# Patient Record
Sex: FEMALE | Employment: UNEMPLOYED | URBAN - NONMETROPOLITAN AREA
[De-identification: names, ages, dates, MRNs, and addresses within clinical notes are randomized per-mention and may not be internally consistent; named-entity substitution may affect disease eponyms.]

---

## 2018-09-17 ENCOUNTER — OFFICE VISIT (OUTPATIENT)
Dept: FAMILY MEDICINE CLINIC | Age: 28
End: 2018-09-17
Payer: COMMERCIAL

## 2018-09-17 VITALS
WEIGHT: 195 LBS | HEART RATE: 87 BPM | OXYGEN SATURATION: 98 % | SYSTOLIC BLOOD PRESSURE: 120 MMHG | HEIGHT: 65 IN | RESPIRATION RATE: 16 BRPM | TEMPERATURE: 97.2 F | DIASTOLIC BLOOD PRESSURE: 80 MMHG | BODY MASS INDEX: 32.49 KG/M2

## 2018-09-17 DIAGNOSIS — F31.9 BIPOLAR 1 DISORDER (HCC): Primary | ICD-10-CM

## 2018-09-17 DIAGNOSIS — Z76.89 ENCOUNTER TO ESTABLISH CARE WITH NEW DOCTOR: ICD-10-CM

## 2018-09-17 DIAGNOSIS — F20.89 OTHER SCHIZOPHRENIA (HCC): ICD-10-CM

## 2018-09-17 DIAGNOSIS — L20.82 FLEXURAL ECZEMA: ICD-10-CM

## 2018-09-17 PROCEDURE — 99204 OFFICE O/P NEW MOD 45 MIN: CPT | Performed by: NURSE PRACTITIONER

## 2018-09-17 PROCEDURE — G8417 CALC BMI ABV UP PARAM F/U: HCPCS | Performed by: NURSE PRACTITIONER

## 2018-09-17 PROCEDURE — 4004F PT TOBACCO SCREEN RCVD TLK: CPT | Performed by: NURSE PRACTITIONER

## 2018-09-17 PROCEDURE — G8427 DOCREV CUR MEDS BY ELIG CLIN: HCPCS | Performed by: NURSE PRACTITIONER

## 2018-09-17 RX ORDER — DIVALPROEX SODIUM 500 MG/1
500 TABLET, EXTENDED RELEASE ORAL DAILY
Qty: 30 TABLET | Refills: 0 | Status: SHIPPED | OUTPATIENT
Start: 2018-09-17 | End: 2019-05-03 | Stop reason: ALTCHOICE

## 2018-09-17 RX ORDER — DIVALPROEX SODIUM 500 MG/1
500 TABLET, EXTENDED RELEASE ORAL DAILY
COMMUNITY
End: 2018-09-17 | Stop reason: SDUPTHER

## 2018-09-17 RX ORDER — RISPERIDONE 2 MG/1
2 TABLET, FILM COATED ORAL 2 TIMES DAILY
Qty: 60 TABLET | Refills: 0 | Status: SHIPPED | OUTPATIENT
Start: 2018-09-17 | End: 2019-05-03 | Stop reason: CLARIF

## 2018-09-17 RX ORDER — DIAPER,BRIEF,INFANT-TODD,DISP
EACH MISCELLANEOUS
Qty: 56 G | Refills: 1 | Status: SHIPPED | OUTPATIENT
Start: 2018-09-17 | End: 2019-06-24

## 2018-09-17 RX ORDER — ZIPRASIDONE HYDROCHLORIDE 80 MG/1
80 CAPSULE ORAL 2 TIMES DAILY
Qty: 60 CAPSULE | Refills: 0 | Status: SHIPPED | OUTPATIENT
Start: 2018-09-17 | End: 2020-06-16

## 2018-09-17 ASSESSMENT — PATIENT HEALTH QUESTIONNAIRE - PHQ9
SUM OF ALL RESPONSES TO PHQ QUESTIONS 1-9: 2
SUM OF ALL RESPONSES TO PHQ9 QUESTIONS 1 & 2: 2
SUM OF ALL RESPONSES TO PHQ QUESTIONS 1-9: 2
1. LITTLE INTEREST OR PLEASURE IN DOING THINGS: 1
2. FEELING DOWN, DEPRESSED OR HOPELESS: 1

## 2018-09-17 NOTE — PATIENT INSTRUCTIONS
to lower your stress. Manage your time, build a strong support system, and lead a healthy lifestyle. To lower your stress, try physical activity, slow deep breathing, or getting a massage. · Do not use alcohol or illegal drugs. · Learn the early signs of your mood changes. You can then take steps to help yourself feel better. · Ask for help from friends and family when you need it. You may need help with daily chores when you are depressed. When you are manic, you may need support to control your high energy levels. What should you do if someone in your family has bipolar disorder? · Learn about the disease and the signs that it is getting worse. · Remind your family member that you love him or her. · Make a plan with all family members about how to take care of your loved one when his or her symptoms are bad. · Talk about your fears and concerns and those of other family members. Seek counseling if needed. · Do not focus attention only on the person who is in treatment. · Remind yourself that it will take time for changes to occur. · Do not blame yourself for the disease. · Know your legal rights and the legal rights of your family member. Support groups or counselors can help you with this information. · Take care of yourself. Keep up with your own interests, such as your career, hobbies, and friends. Use exercise, positive self-talk, deep breathing, and other relaxing exercises to help lower your stress. · Give yourself time to grieve. You may need to deal with emotions such as anger, fear, and frustration. After you work through your feelings, you will be better able to care for yourself and your family. · If you are having a hard time with your feelings or with your relationship with your family member, talk with a counselor. When should you call for help? Call 911 anytime you think you may need emergency care.  For example, call if:    · You feel like hurting yourself or someone else.     · Someone who has bipolar disorder displays dangerous behavior, and you think the person might hurt himself or herself or someone else.   [facility] your doctor now or seek immediate medical care if:    · You hear voices.     · Someone you know has bipolar disorder and talks about suicide. Keep the numbers for these national suicide hotlines: 8-255-218-TALK (5-962.604.7529) and 1-224-MXDUDLV (1-984.943.9566). If a suicide threat seems real, with a specific plan and a way to carry it out, stay with the person, or ask someone you trust to stay with the person, until you can get help.     · Someone you know has bipolar disorder and:  ¨ Starts to give away possessions. ¨ Is using illegal drugs or drinking alcohol heavily. ¨ Talks or writes about death, including writing suicide notes or talking about guns, knives, or pills. ¨ Talks or writes about hurting someone else. ¨ Starts to spend a lot of time alone. ¨ Acts very aggressively or suddenly appears calm. ¨ Talks about beliefs that are not based in reality (delusions).    Watch closely for changes in your health, and be sure to contact your doctor if:    · You cannot go to your counseling sessions. Where can you learn more? Go to https://ANDalyze.Utrip. org and sign in to your Movolo.com account. Enter K052 in the [facility] box to learn more about \"Bipolar Disorder: Care Instructions. \"     If you do not have an account, please click on the \"Sign Up Now\" link. Current as of: December 7, 2017  Content Version: 11.7  © 3399-7211 [employer]og[employer], Incorporated. Care instructions adapted under license by [facility] ([facility]). If you have questions about a medical condition or this instruction, always ask your healthcare professional. [address] any warranty or liability for your use of this information.

## 2018-09-17 NOTE — PROGRESS NOTES
2018    Caio Storm (:  1990) is a 29 y.o. female, here for evaluation of the following medical concerns:    Radha Rey is here with her step-mother to become an established patient today. She has a history of bi-polar, schizophrenia, psycho affective disorder. She moved to the area in the past few months. She was seeing a psychiatrist in [facility], [address]; Dr. Meza November.  Her step-mother states that she seems to have low motivation and low energy. She has been taking her medications as prescribed by the previous pcp. According to her step-mother, she has the mentality of a 15year old. At this time, Radha Rey has no complaints or concerns. Review of Systems   Constitutional: Positive for fatigue. Negative for chills and fever. HENT: Negative for sinus pain and sinus pressure. Eyes: Negative. Respiratory: Negative for cough and shortness of breath. Cardiovascular: Negative for chest pain and leg swelling. Gastrointestinal: Negative for abdominal pain, constipation, diarrhea, nausea and vomiting. Endocrine: Negative. Genitourinary: Negative for difficulty urinating and dysuria. Musculoskeletal: Negative for back pain, myalgias and neck pain. Skin: Positive for rash. Pallor: bilateral insides of her elbows. Neurological: Negative. Psychiatric/Behavioral: Positive for decreased concentration and dysphoric mood. Negative for agitation, behavioral problems, confusion, hallucinations, self-injury, sleep disturbance and suicidal ideas. The patient is not nervous/anxious and is not hyperactive. Prior to Visit Medications    Medication Sig Taking?  Authorizing Provider   ziprasidone (GEODON) 80 MG capsule Take 1 capsule by mouth 2 times daily Yes Galindo November, APRN - CNP   risperiDONE (RISPERDAL) 2 MG tablet Take 1 tablet by mouth 2 times daily 1/2 tab AM and 1 tab at bedtime Yes Galindo November, APRN - CNP   divalproex (DEPAKOTE ER) 500 MG extended release

## 2018-09-18 ASSESSMENT — ENCOUNTER SYMPTOMS
BACK PAIN: 0
VOMITING: 0
SINUS PAIN: 0
SHORTNESS OF BREATH: 0
COUGH: 0
ABDOMINAL PAIN: 0
NAUSEA: 0
CONSTIPATION: 0
EYES NEGATIVE: 1
DIARRHEA: 0
SINUS PRESSURE: 0

## 2018-09-19 ENCOUNTER — TELEPHONE (OUTPATIENT)
Dept: FAMILY MEDICINE CLINIC | Age: 28
End: 2018-09-19

## 2018-09-19 NOTE — TELEPHONE ENCOUNTER
eMra from [facility] called to let us know that Iva Abebe reviewed the patient chart and has determined Alejandro Vides is a better fit for the patients needs.

## 2018-09-20 NOTE — TELEPHONE ENCOUNTER
Called [facility] and left a message. Needing to know requirements for referring patient to this office.

## 2018-09-21 NOTE — TELEPHONE ENCOUNTER
Patient step mom returned my phone call. Notified her that the patient was being referred to [facility] and explained to her Bills process. That it is based on walk-in from 8am-8pm. Patient would need to bring photo id, insurance card, and proof of income if she has it. Then they would do a screening and if they arrived early enough between 8am-2pm they would be able to complete a diagnostic screening which would help get the process going sooner. Gave her the address and phone number. She did ask why [facility] office had called and scheduled an appointment with the patient and then called and canceled it. Notified her that the office had reviewed the patient file and diagnosis and decided [employer] [facility] had more available to provide the best care for the patient. She voiced understanding and denied any other questions or concerns at this time.

## 2019-02-01 LAB
ALBUMIN SERPL-MCNC: 3.7 G/DL
ALP BLD-CCNC: 87 U/L
ALT SERPL-CCNC: 17 U/L
ANION GAP SERPL CALCULATED.3IONS-SCNC: 14 MMOL/L
AST SERPL-CCNC: 20 U/L
BASOPHILS ABSOLUTE: ABNORMAL /ΜL
BASOPHILS RELATIVE PERCENT: ABNORMAL %
BILIRUB SERPL-MCNC: 0.2 MG/DL (ref 0.1–1.4)
BUN BLDV-MCNC: 10 MG/DL
CALCIUM SERPL-MCNC: 8.9 MG/DL
CHLORIDE BLD-SCNC: 103 MMOL/L
CO2: 20 MMOL/L
CREAT SERPL-MCNC: 0.56 MG/DL
EOSINOPHILS ABSOLUTE: ABNORMAL /ΜL
EOSINOPHILS RELATIVE PERCENT: ABNORMAL %
GFR CALCULATED: >60
GLUCOSE BLD-MCNC: 149 MG/DL
HCT VFR BLD CALC: 35.3 % (ref 36–46)
HEMOGLOBIN: 11.7 G/DL (ref 12–16)
LYMPHOCYTES ABSOLUTE: ABNORMAL /ΜL
LYMPHOCYTES RELATIVE PERCENT: ABNORMAL %
MCH RBC QN AUTO: 28.7 PG
MCHC RBC AUTO-ENTMCNC: 33.2 G/DL
MCV RBC AUTO: 86.4 FL
MONOCYTES ABSOLUTE: ABNORMAL /ΜL
MONOCYTES RELATIVE PERCENT: ABNORMAL %
NEUTROPHILS ABSOLUTE: ABNORMAL /ΜL
NEUTROPHILS RELATIVE PERCENT: ABNORMAL %
PDW BLD-RTO: 13.8 %
PLATELET # BLD: 311 K/ΜL
PMV BLD AUTO: 6.7 FL
POTASSIUM SERPL-SCNC: 3.7 MMOL/L
RBC # BLD: 4.09 10^6/ΜL
SODIUM BLD-SCNC: 133 MMOL/L
TOTAL PROTEIN: 7.5
WBC # BLD: 6.8 10^3/ML

## 2019-05-03 ENCOUNTER — OFFICE VISIT (OUTPATIENT)
Dept: FAMILY MEDICINE CLINIC | Age: 29
End: 2019-05-03
Payer: COMMERCIAL

## 2019-05-03 VITALS
HEIGHT: 66 IN | BODY MASS INDEX: 31.82 KG/M2 | HEART RATE: 63 BPM | DIASTOLIC BLOOD PRESSURE: 70 MMHG | WEIGHT: 198 LBS | SYSTOLIC BLOOD PRESSURE: 108 MMHG | OXYGEN SATURATION: 98 % | TEMPERATURE: 97.7 F | RESPIRATION RATE: 16 BRPM

## 2019-05-03 DIAGNOSIS — B35.1 TOENAIL FUNGUS: ICD-10-CM

## 2019-05-03 DIAGNOSIS — B35.3 TINEA PEDIS OF BOTH FEET: Primary | ICD-10-CM

## 2019-05-03 PROCEDURE — 99213 OFFICE O/P EST LOW 20 MIN: CPT | Performed by: NURSE PRACTITIONER

## 2019-05-03 PROCEDURE — 4004F PT TOBACCO SCREEN RCVD TLK: CPT | Performed by: NURSE PRACTITIONER

## 2019-05-03 PROCEDURE — G8427 DOCREV CUR MEDS BY ELIG CLIN: HCPCS | Performed by: NURSE PRACTITIONER

## 2019-05-03 PROCEDURE — G8417 CALC BMI ABV UP PARAM F/U: HCPCS | Performed by: NURSE PRACTITIONER

## 2019-05-03 RX ORDER — TERBINAFINE HYDROCHLORIDE 250 MG/1
250 TABLET ORAL DAILY
Qty: 42 TABLET | Refills: 0 | Status: SHIPPED | OUTPATIENT
Start: 2019-05-03 | End: 2019-06-24 | Stop reason: SDUPTHER

## 2019-05-03 RX ORDER — RISPERIDONE 0.5 MG/1
TABLET, FILM COATED ORAL
Refills: 2 | COMMUNITY
Start: 2019-04-17 | End: 2020-06-16

## 2019-05-03 NOTE — PATIENT INSTRUCTIONS
if:    · You have signs of infection, such as:  ? Increased pain, swelling, warmth, or redness. ? Red streaks leading from the site. ? Pus draining from the site. ? A fever.     · You have new or increased toe pain.    Watch closely for changes in your health, and be sure to contact your doctor if:    · You do not get better as expected. Where can you learn more? Go to https://3Play Mediapepiceweb.Ultragenyx Pharmaceutical. org and sign in to your Webdyn account. Enter D202 in the [employer] box to learn more about \"Toenail Fungus: Care Instructions. \"     If you do not have an account, please click on the \"Sign Up Now\" link. Current as of: April 17, 2018  Content Version: 11.9  © 0500-2318 Credivalores-Crediservicios. Care instructions adapted under license by [facility] ([facility]). If you have questions about a medical condition or this instruction, always ask your healthcare professional. [place] [address] any warranty or liability for your use of this information. Patient Education        Toenail Fungus: Care Instructions  Your Care Instructions  A toenail that is infected by a fungus usually turns white or yellow. As the fungus spreads, the nail turns a darker color and gets thicker, and its edges start to turn ragged and crumble. A bad infection can cause toe pain, and the nail may pull away from the toe. Toenails that are exposed to moisture and warmth a lot are more likely to get infected by a fungus. This can happen from wearing sweaty shoes often and from walking barefoot on shower floors. It is hard to treat toenail fungus, and the infection can return after it has cleared up. But medicines can sometimes get rid of toenail fungus for good. If the infection is very bad, or if it causes a lot of pain, you may need to have the nail removed. Follow-up care is a key part of your treatment and safety. Be sure to make and go to all appointments, and call your doctor if you are having problems. It's also a good idea to know your test results and keep a list of the medicines you take. How can you care for yourself at home? · Take your medicines exactly as prescribed. Call your doctor if you have any problems with your medicine. You will get more details on the specific medicines your doctor prescribes. · If your doctor gave you a cream or liquid to put on your toenail, use it exactly as directed. · Wash your feet often, and wash your hands after touching your feet. · Keep your toenails clean and dry. Dry your feet completely after you bathe and before you put on shoes and socks. · Keep your toenails trimmed. · Change socks often. Wear dry socks that absorb moisture. · Do not go barefoot in public places. · Use a spray or powder that fights fungus on your feet and in your shoes. · Do not pick at the skin around your nails. · Do not use nail polish or fake nails on your toenails. When should you call for help? Call your doctor now or seek immediate medical care if:    · You have signs of infection, such as:  ? Increased pain, swelling, warmth, or redness. ? Red streaks leading from the site. ? Pus draining from the site. ? A fever.     · You have new or increased toe pain.    Watch closely for changes in your health, and be sure to contact your doctor if:    · You do not get better as expected. Where can you learn more? Go to https://TelirispeMeridian Energy USA.Microbridge Technologies [place]. org and sign in to your [employer]anta Pharmaceuticals account. Enter D202 in the [facility] box to learn more about \"Toenail Fungus: Care Instructions. \"     If you do not have an account, please click on the \"Sign Up Now\" link. Current as of: April 17, 2018  Content Version: 11.9  © 5982-2288 MJH. Care instructions adapted under license by [employer].  If you have questions about a medical condition or this instruction, always ask your healthcare professional. Frida Shields disclaims any warranty or liability for your use of this information.

## 2019-05-08 ASSESSMENT — ENCOUNTER SYMPTOMS
SHORTNESS OF BREATH: 0
COLOR CHANGE: 1
DIARRHEA: 0
COUGH: 0
NAUSEA: 0
VOMITING: 0

## 2019-05-08 NOTE — PROGRESS NOTES
[address] [address] [place] [address]  Dept: [phone]  Dept Fax: [phone]  Loc: [phone]      Natalie Mijares is a 29 y.o. female who presents todayfor Foot Pain (left foot- noticied 2 \"spots\" on bottom on foot wed. - painful )      HPI:      Annie Morales is a 31-year-old female she is here with complaints of spots on her left foot that she noticed Wednesday, she states they're painful at times. She also complains of an odor to both of her feet. The patient has No Known Allergies. Past MedicalHistory  Annie Morales  has a past medical history of Bipolar affective ([facility] [address] Medications    Current Outpatient Medications:     risperiDONE (RISPERDAL) 0.5 MG tablet, TAKE 1 TAB EVERY MORNING AND 2 TABLETS EVERY EVENING, Disp: , Rfl: 2    terbinafine (LAMISIL) 250 MG tablet, Take 1 tablet by mouth daily, Disp: 42 tablet, Rfl: 0    ziprasidone (GEODON) 80 MG capsule, Take 1 capsule by mouth 2 times daily, Disp: 60 capsule, Rfl: 0    hydrocortisone 0.5 % ointment, Apply topically 2 times daily. , Disp: 56 g, Rfl: 1    Subjective:      Review of Systems   Constitutional: Negative for chills, fatigue and fever. Respiratory: Negative for cough and shortness of breath. Gastrointestinal: Negative for diarrhea, nausea and vomiting. Genitourinary: Negative for difficulty urinating. Skin: Positive for color change. Odor, itching to bilateral feet. Neurological: Negative for dizziness, weakness and headaches. Objective:        Vitals:    05/03/19 1130   BP: 108/70   Site: Left Upper Arm   Position: Sitting   Pulse: 63   Resp: 16   Temp: 97.7 °F (36.5 °C)   TempSrc: Oral   SpO2: 98%   Weight: 198 lb (89.8 kg)   Height: 5' 6\" (1.676 m)      Physical Exam   Constitutional: She is oriented to person, place, and time. She appears well-developed and well-nourished. No distress.    HENT:   Head: Normocephalic and atraumatic. Eyes: Pupils are equal, round, and reactive to light. Conjunctivae and EOM are normal.   Neck: Normal range of motion. Neck supple. Cardiovascular: Normal rate and regular rhythm. Pulmonary/Chest: Effort normal and breath sounds normal.   Musculoskeletal: Normal range of motion. Neurological: She is alert and oriented to person, place, and time. Skin: Skin is warm. Capillary refill takes less than 2 seconds. She is not diaphoretic. Bilateral anterior portion of feet with scaling. Bilateral feet both moist with sweat and very malodorous. No open sores noted to feet. Several toes on bilateral feet with thick toenails yellow in color. Vitals reviewed. Assessment/Plan:       Toro Monroe was seen today for foot pain. Diagnoses and all orders for this visit:    Tinea pedis of both feet  -     terbinafine (LAMISIL) 250 MG tablet; Take 1 tablet by mouth daily    Toenail fungus  -     terbinafine (LAMISIL) 250 MG tablet; Take 1 tablet by mouth daily    Due to Toro Monroe is extensive bilateral tinea pedis the decision was made to start her on Lamisil likely will be for 12 weeks. Recent AST 20, ALT 17.    Educational material provided and reviewed, patient is to follow-up in 4-6 weeks or sooner if needed. Return in about 6 weeks (around 6/14/2019). Patient instructions given and reviewed.     Electronicallysigned by CAMMIE Felix CNP on 5/8/2019 at 5:28 PM

## 2019-06-24 ENCOUNTER — OFFICE VISIT (OUTPATIENT)
Dept: FAMILY MEDICINE CLINIC | Age: 29
End: 2019-06-24
Payer: COMMERCIAL

## 2019-06-24 VITALS
RESPIRATION RATE: 16 BRPM | OXYGEN SATURATION: 96 % | SYSTOLIC BLOOD PRESSURE: 104 MMHG | HEART RATE: 74 BPM | BODY MASS INDEX: 31.72 KG/M2 | HEIGHT: 66 IN | WEIGHT: 197.4 LBS | DIASTOLIC BLOOD PRESSURE: 72 MMHG

## 2019-06-24 DIAGNOSIS — B35.3 TINEA PEDIS OF BOTH FEET: ICD-10-CM

## 2019-06-24 DIAGNOSIS — B35.1 TOENAIL FUNGUS: ICD-10-CM

## 2019-06-24 PROCEDURE — 99214 OFFICE O/P EST MOD 30 MIN: CPT | Performed by: NURSE PRACTITIONER

## 2019-06-24 PROCEDURE — 1036F TOBACCO NON-USER: CPT | Performed by: NURSE PRACTITIONER

## 2019-06-24 PROCEDURE — G8417 CALC BMI ABV UP PARAM F/U: HCPCS | Performed by: NURSE PRACTITIONER

## 2019-06-24 PROCEDURE — G8427 DOCREV CUR MEDS BY ELIG CLIN: HCPCS | Performed by: NURSE PRACTITIONER

## 2019-06-24 RX ORDER — TERBINAFINE HYDROCHLORIDE 250 MG/1
250 TABLET ORAL DAILY
Qty: 42 TABLET | Refills: 0 | Status: SHIPPED | OUTPATIENT
Start: 2019-06-24 | End: 2019-08-05

## 2019-06-24 NOTE — PROGRESS NOTES
2019     Pedro Sierra (:  1990) is a 29 y.o. female, here for evaluation of the following medical concerns:    Adrian is a 59-year-old female known to myself she is here to follow-up for bilateral toe fungus. She states that it has improved about 50%, she has been on it for 6 weeks      Review of Systems   Constitutional: Negative for appetite change, chills, diaphoresis, fatigue and fever. Respiratory: Negative for cough, choking and shortness of breath. Cardiovascular: Negative for chest pain and leg swelling. Gastrointestinal: Negative for constipation, nausea and vomiting. Genitourinary: Negative for difficulty urinating. Musculoskeletal: Negative for back pain, gait problem, joint swelling, myalgias and neck pain. Skin: Negative for color change, pallor, rash and wound. Bilateral great toenails still with yellowing, smaller toes with improvement. Prior to Visit Medications    Medication Sig Taking? Authorizing Provider   terbinafine (LAMISIL) 250 MG tablet Take 1 tablet by mouth daily Yes CAMMIE Hooper CNP   risperiDONE (RISPERDAL) 0.5 MG tablet TAKE 1 TAB EVERY MORNING AND 2 TABLETS EVERY EVENING Yes Historical Provider, MD   ziprasidone (GEODON) 80 MG capsule Take 1 capsule by mouth 2 times daily Yes CAMMIE Hooper CNP        Social History     Tobacco Use    Smoking status: Former Smoker     Types: Cigarettes     Last attempt to quit: 2019     Years since quittin.1    Smokeless tobacco: Never Used   Substance Use Topics    Alcohol use: No        Vitals:    19 1102   BP: 104/72   Site: Right Upper Arm   Pulse: 74   Resp: 16   SpO2: 96%   Weight: 197 lb 6.4 oz (89.5 kg)   Height: 5' 6\" (1.676 m)     Estimated body mass index is 31.86 kg/m² as calculated from the following:    Height as of this encounter: 5' 6\" (1.676 m). Weight as of this encounter: 197 lb 6.4 oz (89.5 kg).     Physical Exam   Constitutional: She is oriented to person, place, and time. She appears well-developed and well-nourished. No distress. HENT:   Head: Normocephalic and atraumatic. Right Ear: External ear normal.   Left Ear: External ear normal.   Nose: Nose normal.   Mouth/Throat: Oropharynx is clear and moist. No oropharyngeal exudate. Eyes: Pupils are equal, round, and reactive to light. Conjunctivae and EOM are normal.   Neck: Normal range of motion. Neck supple. Cardiovascular: Normal rate and regular rhythm. Pulmonary/Chest: Effort normal and breath sounds normal.   Abdominal: Soft. Musculoskeletal: Normal range of motion. Neurological: She is alert and oriented to person, place, and time. Skin: Skin is warm. Capillary refill takes less than 2 seconds. She is not diaphoretic. Bilateral great toenails, thick and yellow in color. All other toe nails, appear to be growing out healthy from the nail fold. Psychiatric: She has a normal mood and affect. Vitals reviewed. ASSESSMENT/PLAN:  1. Tinea pedis of both feet  resolved    2. Toenail fungus  Due to the toenail fungus that still remains an additional 6 weeks or can to be completed of the Lamisil. - terbinafine (LAMISIL) 250 MG tablet; Take 1 tablet by mouth daily  Dispense: 42 tablet; Refill: 0      Return in about 3 months (around 9/24/2019). An electronic signature was used to authenticate this note.     --Joaquin Celis, CAMMIE - CNP on 6/25/2019 at 1:49 PM

## 2019-06-24 NOTE — PATIENT INSTRUCTIONS
Patient Education        Athlete's Foot: Care Instructions  Your Care Instructions    Athlete's foot is an itchy rash on the foot caused by an infection with a fungus. You can get it by going barefoot in wet public areas, such as swimming pools or locker rooms. Many times there is no clear reason why you get athlete's foot. You can easily treat athlete's foot by putting medicine on your feet for 1 to 6 weeks. In some cases, a doctor may prescribe pills to kill the fungus. Follow-up care is a key part of your treatment and safety. Be sure to make and go to all appointments, and call your doctor if you are having problems. It's also a good idea to know your test results and keep a list of the medicines you take. How can you care for yourself at home? · Your doctor may suggest an over-the counter lotion or spray or may prescribe a medicine. Take your medicines exactly as prescribed. Call your doctor if you think you are having a problem with your medicine. · Keep your feet clean and dry. · When you get dressed, put your socks on before your underwear. This can prevent the fungus from spreading from your feet to your groin. To prevent athlete's foot  · Wear flip-flops or other shower sandals in public locker rooms and showers and by the pool. · Dry between your toes after swimming or bathing. · Wear leather shoes or sandals, which let air get to your feet. · Change your socks as needed so your feet stay as dry as possible. · Use antifungal powder on your feet. When should you call for help? Watch closely for changes in your health, and be sure to contact your doctor if:    · You do not get better as expected. Where can you learn more? Go to https://chpatience.excentos. org and sign in to your YouTab account. Enter M498 in the [employer] box to learn more about \"Athlete's Foot: Care Instructions. \"     If you do not have an account, please click on the \"Sign Up Now\" link.   Current as of: April 17, 2018  Content Version: 12.0  © 3769-6312 Healthwise, [employer]. Care instructions adapted under license by [facility] ([facility]). If you have questions about a medical condition or this instruction, always ask your healthcare professional. [address] any warranty or liability for your use of this information. Patient Education        Toenail Fungus: Care Instructions  Your Care Instructions  A toenail that is infected by a fungus usually turns white or yellow. As the fungus spreads, the nail turns a darker color and gets thicker, and its edges start to turn ragged and crumble. A bad infection can cause toe pain, and the nail may pull away from the toe. Toenails that are exposed to moisture and warmth a lot are more likely to get infected by a fungus. This can happen from wearing sweaty shoes often and from walking barefoot on shower floors. It is hard to treat toenail fungus, and the infection can return after it has cleared up. But medicines can sometimes get rid of toenail fungus for good. If the infection is very bad, or if it causes a lot of pain, you may need to have the nail removed. Follow-up care is a key part of your treatment and safety. Be sure to make and go to all appointments, and call your doctor if you are having problems. It's also a good idea to know your test results and keep a list of the medicines you take. How can you care for yourself at home? · Take your medicines exactly as prescribed. Call your doctor if you have any problems with your medicine. You will get more details on the specific medicines your doctor prescribes. · If your doctor gave you a cream or liquid to put on your toenail, use it exactly as directed. · Wash your feet often, and wash your hands after touching your feet. · Keep your toenails clean and dry. Dry your feet completely after you bathe and before you put on shoes and socks. · Keep your toenails trimmed.   · Change socks often. Wear dry socks that absorb moisture. · Do not go barefoot in public places. · Use a spray or powder that fights fungus on your feet and in your shoes. · Do not pick at the skin around your nails. · Do not use nail polish or fake nails on your toenails. When should you call for help? Call your doctor now or seek immediate medical care if:    · You have signs of infection, such as:  ? Increased pain, swelling, warmth, or redness. ? Red streaks leading from the site. ? Pus draining from the site. ? A fever.     · You have new or increased toe pain.    Watch closely for changes in your health, and be sure to contact your doctor if:    · You do not get better as expected. Where can you learn more? Go to https://BreakingPoint Systemspepiceweb.NDSSI Holdings. org and sign in to your Surphace account. Enter D202 in the [employer] box to learn more about \"Toenail Fungus: Care Instructions. \"     If you do not have an account, please click on the \"Sign Up Now\" link. Current as of: April 17, 2018  Content Version: 12.0  © 5592-8039 Healthwise, Incorporated. Care instructions adapted under license by [facility] ([facility]). If you have questions about a medical condition or this instruction, always ask your healthcare professional. [address] any warranty or liability for your use of this information.

## 2019-06-25 ASSESSMENT — ENCOUNTER SYMPTOMS
CONSTIPATION: 0
CHOKING: 0
NAUSEA: 0
BACK PAIN: 0
COLOR CHANGE: 0
COUGH: 0
VOMITING: 0
SHORTNESS OF BREATH: 0

## 2019-08-16 ENCOUNTER — HOSPITAL ENCOUNTER (OUTPATIENT)
Age: 29
Setting detail: SPECIMEN
Discharge: HOME | End: 2019-08-16
Payer: COMMERCIAL

## 2019-08-16 LAB
ABSOLUTE EOS #: 0.2 K/UL (ref 0–0.44)
ABSOLUTE IMMATURE GRANULOCYTE: <0.03 K/UL (ref 0–0.3)
ABSOLUTE LYMPH #: 1.94 K/UL (ref 1.1–3.7)
ABSOLUTE MONO #: 0.5 K/UL (ref 0.1–1.2)
ALBUMIN SERPL-MCNC: 4.4 G/DL (ref 3.5–5.2)
ALBUMIN/GLOBULIN RATIO: 1.5 (ref 1–2.5)
ALP BLD-CCNC: 95 U/L (ref 35–104)
ALT SERPL-CCNC: 11 U/L (ref 5–33)
ANION GAP SERPL CALCULATED.3IONS-SCNC: 15 MMOL/L (ref 9–17)
AST SERPL-CCNC: 12 U/L
BASOPHILS # BLD: 1 % (ref 0–2)
BASOPHILS ABSOLUTE: 0.06 K/UL (ref 0–0.2)
BILIRUB SERPL-MCNC: 0.18 MG/DL (ref 0.3–1.2)
BUN BLDV-MCNC: 10 MG/DL (ref 6–20)
BUN/CREAT BLD: ABNORMAL (ref 9–20)
CALCIUM SERPL-MCNC: 9.8 MG/DL (ref 8.6–10.4)
CHLORIDE BLD-SCNC: 104 MMOL/L (ref 98–107)
CHOLESTEROL/HDL RATIO: 3.6
CHOLESTEROL: 134 MG/DL
CO2: 22 MMOL/L (ref 20–31)
CREAT SERPL-MCNC: 0.87 MG/DL (ref 0.5–0.9)
DIFFERENTIAL TYPE: ABNORMAL
EOSINOPHILS RELATIVE PERCENT: 3 % (ref 1–4)
GFR AFRICAN AMERICAN: >60 ML/MIN
GFR NON-AFRICAN AMERICAN: >60 ML/MIN
GFR SERPL CREATININE-BSD FRML MDRD: ABNORMAL ML/MIN/{1.73_M2}
GFR SERPL CREATININE-BSD FRML MDRD: ABNORMAL ML/MIN/{1.73_M2}
GLUCOSE BLD-MCNC: 83 MG/DL (ref 70–99)
HCT VFR BLD CALC: 41.9 % (ref 36.3–47.1)
HDLC SERPL-MCNC: 37 MG/DL
HEMOGLOBIN: 12.5 G/DL (ref 11.9–15.1)
HEPATITIS C ANTIBODY: NONREACTIVE
HIV AG/AB: NONREACTIVE
IMMATURE GRANULOCYTES: 0 %
LDL CHOLESTEROL: 80 MG/DL (ref 0–130)
LYMPHOCYTES # BLD: 27 % (ref 24–43)
MCH RBC QN AUTO: 27.7 PG (ref 25.2–33.5)
MCHC RBC AUTO-ENTMCNC: 29.8 G/DL (ref 28.4–34.8)
MCV RBC AUTO: 92.7 FL (ref 82.6–102.9)
MONOCYTES # BLD: 7 % (ref 3–12)
NRBC AUTOMATED: 0 PER 100 WBC
PDW BLD-RTO: 14.7 % (ref 11.8–14.4)
PLATELET # BLD: 339 K/UL (ref 138–453)
PLATELET ESTIMATE: ABNORMAL
PMV BLD AUTO: 9.2 FL (ref 8.1–13.5)
POTASSIUM SERPL-SCNC: 4.6 MMOL/L (ref 3.7–5.3)
RBC # BLD: 4.52 M/UL (ref 3.95–5.11)
RBC # BLD: ABNORMAL 10*6/UL
SEG NEUTROPHILS: 62 % (ref 36–65)
SEGMENTED NEUTROPHILS ABSOLUTE COUNT: 4.56 K/UL (ref 1.5–8.1)
SODIUM BLD-SCNC: 141 MMOL/L (ref 135–144)
TOTAL PROTEIN: 7.3 G/DL (ref 6.4–8.3)
TRIGL SERPL-MCNC: 83 MG/DL
TSH SERPL DL<=0.05 MIU/L-ACNC: 1.07 MIU/L (ref 0.3–5)
VLDLC SERPL CALC-MCNC: ABNORMAL MG/DL (ref 1–30)
WBC # BLD: 7.3 K/UL (ref 3.5–11.3)
WBC # BLD: ABNORMAL 10*3/UL

## 2019-08-20 LAB
CHLAMYDIA BY THIN PREP: NEGATIVE
HPV SAMPLE: ABNORMAL
HPV, GENOTYPE 16: NOT DETECTED
HPV, GENOTYPE 18: NOT DETECTED
HPV, HIGH RISK OTHER: DETECTED
HPV, INTERPRETATION: ABNORMAL
N. GONORRHOEAE DNA, THIN PREP: NEGATIVE
SPECIMEN DESCRIPTION: ABNORMAL
SPECIMEN DESCRIPTION: NORMAL

## 2019-08-22 LAB — CYTOLOGY REPORT: NORMAL

## 2020-04-02 ENCOUNTER — HOSPITAL ENCOUNTER (EMERGENCY)
Age: 30
Discharge: HOME | End: 2020-04-02
Payer: COMMERCIAL

## 2020-04-02 VITALS
SYSTOLIC BLOOD PRESSURE: 116 MMHG | BODY MASS INDEX: 26.66 KG/M2 | TEMPERATURE: 98.6 F | WEIGHT: 160 LBS | HEART RATE: 81 BPM | OXYGEN SATURATION: 99 % | RESPIRATION RATE: 18 BRPM | DIASTOLIC BLOOD PRESSURE: 74 MMHG | HEIGHT: 65 IN

## 2020-04-02 LAB
ALBUMIN SERPL-MCNC: 3.7 G/DL (ref 3.5–5.1)
ALP BLD-CCNC: 64 U/L (ref 38–126)
ALT SERPL-CCNC: 39 U/L (ref 11–66)
ANION GAP SERPL CALCULATED.3IONS-SCNC: 11 MEQ/L (ref 8–16)
AST SERPL-CCNC: 30 U/L (ref 5–40)
BACTERIA: ABNORMAL /HPF
BASOPHILS # BLD: 0.3 %
BASOPHILS ABSOLUTE: 0 THOU/MM3 (ref 0–0.1)
BILIRUB SERPL-MCNC: 0.5 MG/DL (ref 0.3–1.2)
BILIRUBIN URINE: NEGATIVE
BLOOD, URINE: NEGATIVE
BUN BLDV-MCNC: 9 MG/DL (ref 7–22)
CALCIUM SERPL-MCNC: 8.8 MG/DL (ref 8.5–10.5)
CASTS 2: ABNORMAL /LPF
CASTS UA: ABNORMAL /LPF
CHARACTER, URINE: ABNORMAL
CHLORIDE BLD-SCNC: 100 MEQ/L (ref 98–111)
CO2: 25 MEQ/L (ref 23–33)
COLOR: YELLOW
CREAT SERPL-MCNC: 0.6 MG/DL (ref 0.4–1.2)
CRYSTALS, UA: ABNORMAL
EOSINOPHIL # BLD: 1.7 %
EOSINOPHILS ABSOLUTE: 0.1 THOU/MM3 (ref 0–0.4)
EPITHELIAL CELLS, UA: ABNORMAL /HPF
ERYTHROCYTE [DISTWIDTH] IN BLOOD BY AUTOMATED COUNT: 14.6 % (ref 11.5–14.5)
ERYTHROCYTE [DISTWIDTH] IN BLOOD BY AUTOMATED COUNT: 47.6 FL (ref 35–45)
FLU A ANTIGEN: NEGATIVE
FLU B ANTIGEN: NEGATIVE
GFR SERPL CREATININE-BSD FRML MDRD: > 90 ML/MIN/1.73M2
GLUCOSE BLD-MCNC: 102 MG/DL (ref 70–108)
GLUCOSE URINE: NEGATIVE MG/DL
HCT VFR BLD CALC: 36.4 % (ref 37–47)
HEMOGLOBIN: 11.9 GM/DL (ref 12–16)
IMMATURE GRANS (ABS): 0.03 THOU/MM3 (ref 0–0.07)
IMMATURE GRANULOCYTES: 0.4 %
KETONES, URINE: NEGATIVE
LEUKOCYTE ESTERASE, URINE: ABNORMAL
LIPASE: 16.1 U/L (ref 5.6–51.3)
LYMPHOCYTES # BLD: 14.2 %
LYMPHOCYTES ABSOLUTE: 1 THOU/MM3 (ref 1–4.8)
MAGNESIUM: 1.8 MG/DL (ref 1.6–2.4)
MCH RBC QN AUTO: 29.3 PG (ref 26–33)
MCHC RBC AUTO-ENTMCNC: 32.7 GM/DL (ref 32.2–35.5)
MCV RBC AUTO: 89.7 FL (ref 81–99)
MISCELLANEOUS 2: ABNORMAL
MONOCYTES # BLD: 6.9 %
MONOCYTES ABSOLUTE: 0.5 THOU/MM3 (ref 0.4–1.3)
NITRITE, URINE: NEGATIVE
NUCLEATED RED BLOOD CELLS: 0 /100 WBC
OSMOLALITY CALCULATION: 270.8 MOSMOL/KG (ref 275–300)
PH UA: 7 (ref 5–9)
PLATELET # BLD: 262 THOU/MM3 (ref 130–400)
PMV BLD AUTO: 8.2 FL (ref 9.4–12.4)
POTASSIUM REFLEX MAGNESIUM: 3.3 MEQ/L (ref 3.5–5.2)
PREGNANCY, URINE: NEGATIVE
PROTEIN UA: NEGATIVE
RBC # BLD: 4.06 MILL/MM3 (ref 4.2–5.4)
RBC URINE: ABNORMAL /HPF
RENAL EPITHELIAL, UA: ABNORMAL
SEG NEUTROPHILS: 76.5 %
SEGMENTED NEUTROPHILS ABSOLUTE COUNT: 5.4 THOU/MM3 (ref 1.8–7.7)
SODIUM BLD-SCNC: 136 MEQ/L (ref 135–145)
SPECIFIC GRAVITY, URINE: < 1.005 (ref 1–1.03)
TOTAL PROTEIN: 6.8 G/DL (ref 6.1–8)
UROBILINOGEN, URINE: 1 EU/DL (ref 0–1)
WBC # BLD: 7.1 THOU/MM3 (ref 4.8–10.8)
WBC UA: ABNORMAL /HPF
YEAST: ABNORMAL

## 2020-04-02 PROCEDURE — 85025 COMPLETE CBC W/AUTO DIFF WBC: CPT

## 2020-04-02 PROCEDURE — 99283 EMERGENCY DEPT VISIT LOW MDM: CPT

## 2020-04-02 PROCEDURE — 83735 ASSAY OF MAGNESIUM: CPT

## 2020-04-02 PROCEDURE — 81001 URINALYSIS AUTO W/SCOPE: CPT

## 2020-04-02 PROCEDURE — 6360000002 HC RX W HCPCS: Performed by: PHYSICIAN ASSISTANT

## 2020-04-02 PROCEDURE — 83690 ASSAY OF LIPASE: CPT

## 2020-04-02 PROCEDURE — 80053 COMPREHEN METABOLIC PANEL: CPT

## 2020-04-02 PROCEDURE — 96374 THER/PROPH/DIAG INJ IV PUSH: CPT

## 2020-04-02 PROCEDURE — 87804 INFLUENZA ASSAY W/OPTIC: CPT

## 2020-04-02 PROCEDURE — 2580000003 HC RX 258: Performed by: PHYSICIAN ASSISTANT

## 2020-04-02 PROCEDURE — 36415 COLL VENOUS BLD VENIPUNCTURE: CPT

## 2020-04-02 PROCEDURE — 81025 URINE PREGNANCY TEST: CPT

## 2020-04-02 PROCEDURE — 87086 URINE CULTURE/COLONY COUNT: CPT

## 2020-04-02 RX ORDER — 0.9 % SODIUM CHLORIDE 0.9 %
1000 INTRAVENOUS SOLUTION INTRAVENOUS ONCE
Status: COMPLETED | OUTPATIENT
Start: 2020-04-02 | End: 2020-04-02

## 2020-04-02 RX ORDER — ONDANSETRON 2 MG/ML
4 INJECTION INTRAMUSCULAR; INTRAVENOUS ONCE
Status: COMPLETED | OUTPATIENT
Start: 2020-04-02 | End: 2020-04-02

## 2020-04-02 RX ADMIN — ONDANSETRON 4 MG: 2 INJECTION INTRAMUSCULAR; INTRAVENOUS at 18:00

## 2020-04-02 RX ADMIN — SODIUM CHLORIDE 1000 ML: 9 INJECTION, SOLUTION INTRAVENOUS at 18:00

## 2020-04-02 ASSESSMENT — ENCOUNTER SYMPTOMS
ABDOMINAL PAIN: 1
NAUSEA: 1
EYE DISCHARGE: 0
SHORTNESS OF BREATH: 0
CHEST TIGHTNESS: 0
RHINORRHEA: 0
COUGH: 0
DIARRHEA: 0
BACK PAIN: 0
VOMITING: 1
SORE THROAT: 0
EYE REDNESS: 0
ABDOMINAL DISTENTION: 0

## 2020-04-02 ASSESSMENT — PAIN DESCRIPTION - LOCATION: LOCATION: ABDOMEN

## 2020-04-02 ASSESSMENT — PAIN SCALES - GENERAL: PAINLEVEL_OUTOF10: 6

## 2020-04-02 ASSESSMENT — PAIN DESCRIPTION - DESCRIPTORS: DESCRIPTORS: ACHING

## 2020-04-02 ASSESSMENT — PAIN DESCRIPTION - PAIN TYPE: TYPE: ACUTE PAIN

## 2020-04-02 NOTE — ED PROVIDER NOTES
Spouse name: None    Number of children: None    Years of education: None    Highest education level: None   Occupational History    None   Social Needs    Financial resource strain: None    Food insecurity     Worry: None     Inability: None    Transportation needs     Medical: None     Non-medical: None   Tobacco Use    Smoking status: Current Every Day Smoker     Types: Cigarettes     Last attempt to quit: 2019     Years since quittin.9    Smokeless tobacco: Never Used   Substance and Sexual Activity    Alcohol use: No    Drug use: No    Sexual activity: Never   Lifestyle    Physical activity     Days per week: None     Minutes per session: None    Stress: None   Relationships    Social connections     Talks on phone: None     Gets together: None     Attends [employer] service: None     Active member of club or organization: None     Attends meetings of clubs or organizations: None     Relationship status: None    Intimate partner violence     Fear of current or ex partner: None     Emotionally abused: None     Physically abused: None     Forced sexual activity: None   Other Topics Concern    None   Social History Narrative    None       REVIEW OF SYSTEMS     Review of Systems   Constitutional: Negative for chills, diaphoresis, fatigue and fever. HENT: Negative for congestion, rhinorrhea and sore throat. Eyes: Negative for discharge, redness and visual disturbance. Respiratory: Negative for cough, chest tightness and shortness of breath. Cardiovascular: Negative for chest pain and leg swelling. Gastrointestinal: Positive for abdominal pain, nausea and vomiting. Negative for abdominal distention and diarrhea. Genitourinary: Negative for decreased urine volume, difficulty urinating, dysuria, frequency and hematuria. Musculoskeletal: Negative for arthralgias and back pain. Skin: Negative for rash and wound. Neurological: Negative for numbness and headaches. Alejo Cuba, 04/02/20 6:58 PM    Provider:  I personally performed the services described in the documentation, reviewed and edited the documentation which was dictated to the scribe in my presence, and it accurately records my words and actions.     Jimmy Barreto PA-C 4/2/20 6:58 PM        Jimmy Barreto PA-C   (electronically signed)            WAYNE Rae  04/02/20 1612

## 2020-04-03 LAB
ORGANISM: ABNORMAL
URINE CULTURE REFLEX: ABNORMAL

## 2020-04-17 ENCOUNTER — APPOINTMENT (OUTPATIENT)
Dept: GENERAL RADIOLOGY | Age: 30
End: 2020-04-17
Payer: COMMERCIAL

## 2020-04-17 ENCOUNTER — HOSPITAL ENCOUNTER (EMERGENCY)
Age: 30
Discharge: LEFT BEFORE DISCHARGE | End: 2020-04-17
Payer: COMMERCIAL

## 2020-04-17 VITALS
OXYGEN SATURATION: 100 % | DIASTOLIC BLOOD PRESSURE: 60 MMHG | BODY MASS INDEX: 26.82 KG/M2 | TEMPERATURE: 98.2 F | RESPIRATION RATE: 16 BRPM | WEIGHT: 161 LBS | HEIGHT: 65 IN | HEART RATE: 81 BPM | SYSTOLIC BLOOD PRESSURE: 122 MMHG

## 2020-04-17 LAB
ALBUMIN SERPL-MCNC: 4.3 G/DL (ref 3.5–5.1)
ALP BLD-CCNC: 68 U/L (ref 38–126)
ALT SERPL-CCNC: 19 U/L (ref 11–66)
AMPHETAMINE+METHAMPHETAMINE URINE SCREEN: NEGATIVE
ANION GAP SERPL CALCULATED.3IONS-SCNC: 11 MEQ/L (ref 8–16)
AST SERPL-CCNC: 18 U/L (ref 5–40)
BARBITURATE QUANTITATIVE URINE: NEGATIVE
BASOPHILS # BLD: 0.8 %
BASOPHILS ABSOLUTE: 0.1 THOU/MM3 (ref 0–0.1)
BENZODIAZEPINE QUANTITATIVE URINE: NEGATIVE
BILIRUB SERPL-MCNC: 0.3 MG/DL (ref 0.3–1.2)
BILIRUBIN URINE: NEGATIVE
BLOOD, URINE: NEGATIVE
BUN BLDV-MCNC: 6 MG/DL (ref 7–22)
C-REACTIVE PROTEIN: < 0.03 MG/DL (ref 0–1)
CALCIUM SERPL-MCNC: 9.5 MG/DL (ref 8.5–10.5)
CANNABINOID QUANTITATIVE URINE: NEGATIVE
CHARACTER, URINE: CLEAR
CHLORIDE BLD-SCNC: 101 MEQ/L (ref 98–111)
CO2: 24 MEQ/L (ref 23–33)
COCAINE METABOLITE QUANTITATIVE URINE: NEGATIVE
COLOR: YELLOW
CREAT SERPL-MCNC: 0.5 MG/DL (ref 0.4–1.2)
EKG ATRIAL RATE: 80 BPM
EKG P AXIS: 45 DEGREES
EKG P-R INTERVAL: 158 MS
EKG Q-T INTERVAL: 388 MS
EKG QRS DURATION: 92 MS
EKG QTC CALCULATION (BAZETT): 447 MS
EKG R AXIS: 78 DEGREES
EKG T AXIS: 60 DEGREES
EKG VENTRICULAR RATE: 80 BPM
EOSINOPHIL # BLD: 1.7 %
EOSINOPHILS ABSOLUTE: 0.1 THOU/MM3 (ref 0–0.4)
ERYTHROCYTE [DISTWIDTH] IN BLOOD BY AUTOMATED COUNT: 14 % (ref 11.5–14.5)
ERYTHROCYTE [DISTWIDTH] IN BLOOD BY AUTOMATED COUNT: 47.2 FL (ref 35–45)
FLU A ANTIGEN: NEGATIVE
FLU B ANTIGEN: NEGATIVE
GFR SERPL CREATININE-BSD FRML MDRD: > 90 ML/MIN/1.73M2
GLUCOSE BLD-MCNC: 100 MG/DL (ref 70–108)
GLUCOSE URINE: NEGATIVE MG/DL
HCT VFR BLD CALC: 36.4 % (ref 37–47)
HEMOGLOBIN: 11.4 GM/DL (ref 12–16)
IMMATURE GRANS (ABS): 0.02 THOU/MM3 (ref 0–0.07)
IMMATURE GRANULOCYTES: 0.3 %
KETONES, URINE: NEGATIVE
LD: 170 U/L (ref 100–190)
LEUKOCYTE ESTERASE, URINE: NEGATIVE
LIPASE: 30.5 U/L (ref 5.6–51.3)
LYMPHOCYTES # BLD: 27.3 %
LYMPHOCYTES ABSOLUTE: 2 THOU/MM3 (ref 1–4.8)
MCH RBC QN AUTO: 28.5 PG (ref 26–33)
MCHC RBC AUTO-ENTMCNC: 31.3 GM/DL (ref 32.2–35.5)
MCV RBC AUTO: 91 FL (ref 81–99)
MONOCYTES # BLD: 7.4 %
MONOCYTES ABSOLUTE: 0.5 THOU/MM3 (ref 0.4–1.3)
NITRITE, URINE: NEGATIVE
NUCLEATED RED BLOOD CELLS: 0 /100 WBC
OPIATES, URINE: NEGATIVE
OSMOLALITY CALCULATION: 269.7 MOSMOL/KG (ref 275–300)
OXYCODONE: NEGATIVE
PH UA: 7.5 (ref 5–9)
PHENCYCLIDINE QUANTITATIVE URINE: NEGATIVE
PLATELET # BLD: 359 THOU/MM3 (ref 130–400)
PMV BLD AUTO: 8.4 FL (ref 9.4–12.4)
POTASSIUM SERPL-SCNC: 3.9 MEQ/L (ref 3.5–5.2)
PREGNANCY, SERUM: NEGATIVE
PROTEIN UA: NEGATIVE
RBC # BLD: 4 MILL/MM3 (ref 4.2–5.4)
SEG NEUTROPHILS: 62.5 %
SEGMENTED NEUTROPHILS ABSOLUTE COUNT: 4.5 THOU/MM3 (ref 1.8–7.7)
SODIUM BLD-SCNC: 136 MEQ/L (ref 135–145)
SPECIFIC GRAVITY, URINE: < 1.005 (ref 1–1.03)
TOTAL PROTEIN: 7.3 G/DL (ref 6.1–8)
TROPONIN T: < 0.01 NG/ML
UROBILINOGEN, URINE: 0.2 EU/DL (ref 0–1)
WBC # BLD: 7.2 THOU/MM3 (ref 4.8–10.8)

## 2020-04-17 PROCEDURE — 83690 ASSAY OF LIPASE: CPT

## 2020-04-17 PROCEDURE — 84484 ASSAY OF TROPONIN QUANT: CPT

## 2020-04-17 PROCEDURE — 84703 CHORIONIC GONADOTROPIN ASSAY: CPT

## 2020-04-17 PROCEDURE — 96374 THER/PROPH/DIAG INJ IV PUSH: CPT

## 2020-04-17 PROCEDURE — 6360000002 HC RX W HCPCS: Performed by: PHYSICIAN ASSISTANT

## 2020-04-17 PROCEDURE — 93005 ELECTROCARDIOGRAM TRACING: CPT | Performed by: EMERGENCY MEDICINE

## 2020-04-17 PROCEDURE — 81003 URINALYSIS AUTO W/O SCOPE: CPT

## 2020-04-17 PROCEDURE — 80307 DRUG TEST PRSMV CHEM ANLYZR: CPT

## 2020-04-17 PROCEDURE — 85025 COMPLETE CBC W/AUTO DIFF WBC: CPT

## 2020-04-17 PROCEDURE — 99284 EMERGENCY DEPT VISIT MOD MDM: CPT

## 2020-04-17 PROCEDURE — 71045 X-RAY EXAM CHEST 1 VIEW: CPT

## 2020-04-17 PROCEDURE — 86140 C-REACTIVE PROTEIN: CPT

## 2020-04-17 PROCEDURE — 83615 LACTATE (LD) (LDH) ENZYME: CPT

## 2020-04-17 PROCEDURE — 36415 COLL VENOUS BLD VENIPUNCTURE: CPT

## 2020-04-17 PROCEDURE — 80053 COMPREHEN METABOLIC PANEL: CPT

## 2020-04-17 PROCEDURE — 87804 INFLUENZA ASSAY W/OPTIC: CPT

## 2020-04-17 RX ORDER — KETOROLAC TROMETHAMINE 30 MG/ML
30 INJECTION, SOLUTION INTRAMUSCULAR; INTRAVENOUS ONCE
Status: COMPLETED | OUTPATIENT
Start: 2020-04-17 | End: 2020-04-17

## 2020-04-17 RX ORDER — BENZONATATE 100 MG/1
100 CAPSULE ORAL 3 TIMES DAILY PRN
Qty: 20 CAPSULE | Refills: 0 | Status: SHIPPED | OUTPATIENT
Start: 2020-04-17 | End: 2020-04-24

## 2020-04-17 RX ORDER — IBUPROFEN 600 MG/1
600 TABLET ORAL EVERY 6 HOURS PRN
Qty: 20 TABLET | Refills: 0 | Status: SHIPPED | OUTPATIENT
Start: 2020-04-17 | End: 2020-06-16

## 2020-04-17 RX ADMIN — KETOROLAC TROMETHAMINE 30 MG: 30 INJECTION, SOLUTION INTRAMUSCULAR at 11:06

## 2020-04-17 ASSESSMENT — ENCOUNTER SYMPTOMS
COLOR CHANGE: 0
COUGH: 1
DIARRHEA: 0
SHORTNESS OF BREATH: 1
ABDOMINAL PAIN: 0
BACK PAIN: 0
SORE THROAT: 1
CONSTIPATION: 0
VOMITING: 0

## 2020-04-17 ASSESSMENT — PAIN SCALES - GENERAL: PAINLEVEL_OUTOF10: 7

## 2020-04-17 NOTE — ED PROVIDER NOTES
time.   Psychiatric:         Mood and Affect: Affect is flat. DIFFERENTIAL DIAGNOSIS:   Differential diagnoses are discussed    DIAGNOSTIC RESULTS     EKG: All EKG's are interpreted by the Emergency Department Physician who either signs or Co-signsthis chart in the absence of a cardiologist.    Cristian Avilez. Rate: 80 bpm  PRinterval: 158 ms  QRS duration: 92 ms  QTc: 447 ms  P-R-T axes: 45, 78, 60  Normal sinus rhythm. No STEMI. RADIOLOGY: non-plain film images(s) such as CT, Ultrasound and MRI are read by the radiologist.    XR CHEST PORTABLE   Final Result   Normal chest.               **This report has been created using voice recognition software. It may contain minor errors which are inherent in voice recognition technology. **      Final report electronically signed by Dr. Lesley Davila on 4/17/2020 10:53 AM          LABS:      Labs Reviewed   CBC WITH AUTO DIFFERENTIAL - Abnormal; Notable for the following components:       Result Value    RBC 4.00 (*)     Hemoglobin 11.4 (*)     Hematocrit 36.4 (*)     MCHC 31.3 (*)     RDW-SD 47.2 (*)     MPV 8.4 (*)     All other components within normal limits   COMPREHENSIVE METABOLIC PANEL - Abnormal; Notable for the following components:    BUN 6 (*)     All other components within normal limits   OSMOLALITY - Abnormal; Notable for the following components:    Osmolality Calc 269.7 (*)     All other components within normal limits   RAPID INFLUENZA A/B ANTIGENS   TROPONIN   HCG, SERUM, QUALITATIVE   C-REACTIVE PROTEIN   LACTATE DEHYDROGENASE   LIPASE   URINE RT REFLEX TO CULTURE   URINE DRUG SCREEN   GLOMERULAR FILTRATION RATE, ESTIMATED   ANION GAP       EMERGENCY DEPARTMENT COURSE:   Vitals:    Vitals:    04/17/20 1009 04/17/20 1016 04/17/20 1024 04/17/20 1105   BP:   122/60    Pulse:  83  81   Resp:  16     Temp:  98.2 °F (36.8 °C)     TempSrc:  Oral     SpO2:  99%  100%   Weight: 161 lb (73 kg)      Height: 5' 5\" (1.651 m)         10:48 AM EDT: The patient

## 2020-04-18 ENCOUNTER — CARE COORDINATION (OUTPATIENT)
Dept: CARE COORDINATION | Age: 30
End: 2020-04-18

## 2020-04-18 PROCEDURE — 93010 ELECTROCARDIOGRAM REPORT: CPT | Performed by: INTERNAL MEDICINE

## 2020-04-20 ENCOUNTER — HOSPITAL ENCOUNTER (EMERGENCY)
Age: 30
Discharge: HOME | End: 2020-04-20
Payer: COMMERCIAL

## 2020-04-20 VITALS
DIASTOLIC BLOOD PRESSURE: 69 MMHG | OXYGEN SATURATION: 99 % | WEIGHT: 160.5 LBS | TEMPERATURE: 97.4 F | HEART RATE: 66 BPM | HEIGHT: 65 IN | BODY MASS INDEX: 26.74 KG/M2 | RESPIRATION RATE: 18 BRPM | SYSTOLIC BLOOD PRESSURE: 121 MMHG

## 2020-04-20 PROCEDURE — 99282 EMERGENCY DEPT VISIT SF MDM: CPT

## 2020-04-20 PROCEDURE — 6370000000 HC RX 637 (ALT 250 FOR IP): Performed by: PHYSICIAN ASSISTANT

## 2020-04-20 RX ORDER — IBUPROFEN 600 MG/1
600 TABLET ORAL 3 TIMES DAILY PRN
Qty: 30 TABLET | Refills: 0 | Status: SHIPPED | OUTPATIENT
Start: 2020-04-20 | End: 2020-04-24

## 2020-04-20 RX ORDER — IBUPROFEN 200 MG
600 TABLET ORAL ONCE
Status: COMPLETED | OUTPATIENT
Start: 2020-04-20 | End: 2020-04-20

## 2020-04-20 RX ADMIN — IBUPROFEN 600 MG: 200 TABLET, FILM COATED ORAL at 11:23

## 2020-04-20 ASSESSMENT — PAIN DESCRIPTION - LOCATION: LOCATION: WRIST

## 2020-04-20 ASSESSMENT — ENCOUNTER SYMPTOMS
ABDOMINAL PAIN: 0
WHEEZING: 0
NAUSEA: 0
EYE ITCHING: 0
VOMITING: 0
RHINORRHEA: 0
DIARRHEA: 0
EYE PAIN: 0
SORE THROAT: 0
EYE DISCHARGE: 0
SHORTNESS OF BREATH: 0
BACK PAIN: 0
COUGH: 0
COLOR CHANGE: 0

## 2020-04-20 ASSESSMENT — PAIN DESCRIPTION - PAIN TYPE: TYPE: ACUTE PAIN

## 2020-04-20 ASSESSMENT — PAIN DESCRIPTION - ORIENTATION: ORIENTATION: LEFT

## 2020-04-20 ASSESSMENT — PAIN SCALES - GENERAL: PAINLEVEL_OUTOF10: 4

## 2020-04-20 ASSESSMENT — PAIN DESCRIPTION - FREQUENCY: FREQUENCY: CONTINUOUS

## 2020-04-20 NOTE — ED PROVIDER NOTES
have NOT CHANGED    Details   risperiDONE (RISPERDAL) 0.5 MG tablet TAKE 1 TAB EVERY MORNING AND 2 TABLETS EVERY EVENING, R-2Historical Med      !! ibuprofen (ADVIL;MOTRIN) 600 MG tablet Take 1 tablet by mouth every 6 hours as needed for Pain, Disp-20 tablet, R-0Print      benzonatate (TESSALON PERLES) 100 MG capsule Take 1 capsule by mouth 3 times daily as needed for Cough, Disp-20 capsule, R-0Print      ziprasidone (GEODON) 80 MG capsule Take 1 capsule by mouth 2 times daily, Disp-60 capsule, R-0Normal       !! - Potential duplicate medications found. Please discuss with provider. ALLERGIES     has No Known Allergies. HISTORY     She indicated that the status of her mother is unknown. She indicated that her paternal grandmother is . She indicated that her paternal grandfather is . family history includes Bipolar Disorder in her mother. SOCIALHISTORY      reports that she has been smoking cigarettes. She has never used smokeless tobacco. She reports that she does not drink alcohol or use drugs. PHYSICAL EXAM     INITIAL VITALS:  height is 5' 5\" (1.651 m) and weight is 160 lb 8 oz (72.8 kg). Her oral temperature is 97.4 °F (36.3 °C). Her blood pressure is 121/69 and her pulse is 66. Her respiration is 18 and oxygen saturation is 99%. Physical Exam  Vitals signs and nursing note reviewed. Constitutional:       Appearance: She is well-developed. HENT:      Head: Normocephalic and atraumatic. Right Ear: Tympanic membrane normal.      Left Ear: Tympanic membrane normal.   Eyes:      Pupils: Pupils are equal, round, and reactive to light. Neck:      Musculoskeletal: Normal range of motion and neck supple. Cardiovascular:      Rate and Rhythm: Normal rate. Pulses: Normal pulses. Heart sounds: Normal heart sounds. Pulmonary:      Effort: Pulmonary effort is normal. No respiratory distress. Abdominal:      General: There is no distension.       Palpations: Abdomen is soft. Musculoskeletal: Normal range of motion. Comments: Left wrist revealed no point tenderness. No scaphoid tenderness. There is no redness or inflammation or swelling or any signs of DVT on exam.   Skin:     General: Skin is warm and dry. Neurological:      Mental Status: She is alert and oriented to person, place, and time. Psychiatric:         Behavior: Behavior normal.         DIFFERENTIAL DIAGNOSIS:   Chronic left wrist pain. Will refer to orthopedics. She requested Motrin will give her a dose of Motrin here. DIAGNOSTIC RESULTS     EKG: All EKG's are interpreted by the Emergency Department Physician who either signs or Co-signs this chart in the absence of a cardiologist.      RADIOLOGY: non-plain film images(s) such as CT, Ultrasound and MRI are read by the radiologist.  Patient declined imaging. LABS:   Labs Reviewed - No data to display    EMERGENCY DEPARTMENT COURSE:   :    Vitals:    04/20/20 1038   BP: 121/69   Pulse: 66   Resp: 18   Temp: 97.4 °F (36.3 °C)   TempSrc: Oral   SpO2: 99%   Weight: 160 lb 8 oz (72.8 kg)   Height: 5' 5\" (1.651 m)     Patient was seen history physical exam was performed. See disposition below    CRITICAL CARE:  None    CONSULTS:  None    PROCEDURES:  None    FINAL IMPRESSION      1. Left wrist pain          DISPOSITION/PLAN   Discharge    PATIENT REFERRED TO:  [address] [place] [id number]  [phone]  In 2 days        DISCHARGE MEDICATIONS:  Discharge Medication List as of 4/20/2020 11:17 AM      START taking these medications    Details   !! ibuprofen (ADVIL;MOTRIN) 600 MG tablet Take 1 tablet by mouth 3 times daily as needed for Pain, Disp-30 tablet, R-0Print       !! - Potential duplicate medications found. Please discuss with provider.           (Please note that portions of this note were completed with a voice recognitionprogram.  Efforts were made to edit the dictations but occasionally words are

## 2020-04-24 ENCOUNTER — HOSPITAL ENCOUNTER (EMERGENCY)
Age: 30
Discharge: HOME | End: 2020-04-24
Payer: COMMERCIAL

## 2020-04-24 VITALS
TEMPERATURE: 98.1 F | HEIGHT: 65 IN | WEIGHT: 160 LBS | DIASTOLIC BLOOD PRESSURE: 70 MMHG | RESPIRATION RATE: 16 BRPM | HEART RATE: 83 BPM | SYSTOLIC BLOOD PRESSURE: 129 MMHG | BODY MASS INDEX: 26.66 KG/M2 | OXYGEN SATURATION: 98 %

## 2020-04-24 LAB — PREGNANCY, URINE: NEGATIVE

## 2020-04-24 PROCEDURE — 81025 URINE PREGNANCY TEST: CPT

## 2020-04-24 PROCEDURE — 99283 EMERGENCY DEPT VISIT LOW MDM: CPT

## 2020-04-24 PROCEDURE — 96372 THER/PROPH/DIAG INJ SC/IM: CPT

## 2020-04-24 PROCEDURE — 6360000002 HC RX W HCPCS: Performed by: PHYSICIAN ASSISTANT

## 2020-04-24 PROCEDURE — 6370000000 HC RX 637 (ALT 250 FOR IP): Performed by: PHYSICIAN ASSISTANT

## 2020-04-24 PROCEDURE — 2500000003 HC RX 250 WO HCPCS: Performed by: PHYSICIAN ASSISTANT

## 2020-04-24 PROCEDURE — 2720000011 HC SANE KIT SUPPLY STERILE

## 2020-04-24 RX ORDER — METRONIDAZOLE 500 MG/1
2000 TABLET ORAL ONCE
Status: COMPLETED | OUTPATIENT
Start: 2020-04-24 | End: 2020-04-24

## 2020-04-24 RX ORDER — DIVALPROEX SODIUM 500 MG/1
1000 TABLET, DELAYED RELEASE ORAL NIGHTLY
COMMUNITY
End: 2020-06-16

## 2020-04-24 RX ORDER — AZITHROMYCIN 250 MG/1
1000 TABLET, FILM COATED ORAL ONCE
Status: COMPLETED | OUTPATIENT
Start: 2020-04-24 | End: 2020-04-24

## 2020-04-24 RX ORDER — LEVONORGESTREL 1.5 MG/1
1.5 TABLET ORAL ONCE
Status: COMPLETED | OUTPATIENT
Start: 2020-04-24 | End: 2020-04-24

## 2020-04-24 RX ORDER — ONDANSETRON 4 MG/1
4 TABLET, ORALLY DISINTEGRATING ORAL ONCE
Status: COMPLETED | OUTPATIENT
Start: 2020-04-24 | End: 2020-04-24

## 2020-04-24 RX ADMIN — LIDOCAINE HYDROCHLORIDE 250 MG: 10 INJECTION, SOLUTION EPIDURAL; INFILTRATION; INTRACAUDAL; PERINEURAL at 22:07

## 2020-04-24 RX ADMIN — METRONIDAZOLE 2000 MG: 500 TABLET, FILM COATED ORAL at 22:06

## 2020-04-24 RX ADMIN — AZITHROMYCIN MONOHYDRATE 1000 MG: 250 TABLET ORAL at 22:06

## 2020-04-24 RX ADMIN — ONDANSETRON 4 MG: 4 TABLET, ORALLY DISINTEGRATING ORAL at 22:05

## 2020-04-24 RX ADMIN — LEVONORGESTREL 1.5 MG: 1.5 TABLET ORAL at 22:18

## 2020-04-24 ASSESSMENT — SLEEP AND FATIGUE QUESTIONNAIRES
DO YOU USE A SLEEP AID: NO
DO YOU HAVE DIFFICULTY SLEEPING: NO
AVERAGE NUMBER OF SLEEP HOURS: 6

## 2020-04-24 ASSESSMENT — PAIN DESCRIPTION - PAIN TYPE: TYPE: ACUTE PAIN
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
## 2020-04-25 ASSESSMENT — ENCOUNTER SYMPTOMS
COUGH: 0
BACK PAIN: 0
VOMITING: 0
ABDOMINAL PAIN: 0
RHINORRHEA: 0
PHOTOPHOBIA: 0
SORE THROAT: 0
NAUSEA: 0
DIARRHEA: 0
SHORTNESS OF BREATH: 0
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
## 2020-05-04 ENCOUNTER — HOSPITAL ENCOUNTER (EMERGENCY)
Age: 30
Discharge: HOME | End: 2020-05-04
Payer: COMMERCIAL

## 2020-05-04 VITALS
SYSTOLIC BLOOD PRESSURE: 113 MMHG | TEMPERATURE: 97.7 F | HEART RATE: 93 BPM | OXYGEN SATURATION: 96 % | BODY MASS INDEX: 26.74 KG/M2 | DIASTOLIC BLOOD PRESSURE: 68 MMHG | HEIGHT: 65 IN | WEIGHT: 160.5 LBS | RESPIRATION RATE: 18 BRPM

## 2020-05-04 LAB
ACETAMINOPHEN LEVEL: < 5 UG/ML (ref 0–20)
ALBUMIN SERPL-MCNC: 3.8 G/DL (ref 3.5–5.1)
ALP BLD-CCNC: 74 U/L (ref 38–126)
ALT SERPL-CCNC: 33 U/L (ref 11–66)
AMPHETAMINE+METHAMPHETAMINE URINE SCREEN: NEGATIVE
ANION GAP SERPL CALCULATED.3IONS-SCNC: 8 MEQ/L (ref 8–16)
AST SERPL-CCNC: 24 U/L (ref 5–40)
BACTERIA: ABNORMAL /HPF
BARBITURATE QUANTITATIVE URINE: NEGATIVE
BASOPHILS # BLD: 0.6 %
BASOPHILS ABSOLUTE: 0.1 THOU/MM3 (ref 0–0.1)
BENZODIAZEPINE QUANTITATIVE URINE: NEGATIVE
BILIRUB SERPL-MCNC: < 0.2 MG/DL (ref 0.3–1.2)
BILIRUBIN DIRECT: < 0.2 MG/DL (ref 0–0.3)
BILIRUBIN URINE: NEGATIVE
BLOOD, URINE: ABNORMAL
BUN BLDV-MCNC: 8 MG/DL (ref 7–22)
CALCIUM SERPL-MCNC: 9.1 MG/DL (ref 8.5–10.5)
CANNABINOID QUANTITATIVE URINE: NEGATIVE
CASTS 2: ABNORMAL /LPF
CASTS UA: ABNORMAL /LPF
CHARACTER, URINE: CLEAR
CHLORIDE BLD-SCNC: 109 MEQ/L (ref 98–111)
CO2: 22 MEQ/L (ref 23–33)
COCAINE METABOLITE QUANTITATIVE URINE: NEGATIVE
COLOR: YELLOW
CREAT SERPL-MCNC: 0.6 MG/DL (ref 0.4–1.2)
CRYSTALS, UA: ABNORMAL
EOSINOPHIL # BLD: 1.6 %
EOSINOPHILS ABSOLUTE: 0.2 THOU/MM3 (ref 0–0.4)
EPITHELIAL CELLS, UA: ABNORMAL /HPF
ERYTHROCYTE [DISTWIDTH] IN BLOOD BY AUTOMATED COUNT: 14.7 % (ref 11.5–14.5)
ERYTHROCYTE [DISTWIDTH] IN BLOOD BY AUTOMATED COUNT: 50.2 FL (ref 35–45)
ETHYL ALCOHOL, SERUM: < 0.01 %
GFR SERPL CREATININE-BSD FRML MDRD: > 90 ML/MIN/1.73M2
GLUCOSE BLD-MCNC: 97 MG/DL (ref 70–108)
GLUCOSE URINE: NEGATIVE MG/DL
HCT VFR BLD CALC: 35.2 % (ref 37–47)
HEMOGLOBIN: 10.9 GM/DL (ref 12–16)
IMMATURE GRANS (ABS): 0.05 THOU/MM3 (ref 0–0.07)
IMMATURE GRANULOCYTES: 0.5 %
KETONES, URINE: ABNORMAL
LEUKOCYTE ESTERASE, URINE: NEGATIVE
LYMPHOCYTES # BLD: 19.1 %
LYMPHOCYTES ABSOLUTE: 2 THOU/MM3 (ref 1–4.8)
MCH RBC QN AUTO: 29 PG (ref 26–33)
MCHC RBC AUTO-ENTMCNC: 31 GM/DL (ref 32.2–35.5)
MCV RBC AUTO: 93.6 FL (ref 81–99)
MISCELLANEOUS 2: ABNORMAL
MONOCYTES # BLD: 6.5 %
MONOCYTES ABSOLUTE: 0.7 THOU/MM3 (ref 0.4–1.3)
NITRITE, URINE: NEGATIVE
NUCLEATED RED BLOOD CELLS: 0 /100 WBC
OPIATES, URINE: NEGATIVE
OSMOLALITY CALCULATION: 275.8 MOSMOL/KG (ref 275–300)
OXYCODONE: NEGATIVE
PH UA: 5 (ref 5–9)
PHENCYCLIDINE QUANTITATIVE URINE: NEGATIVE
PLATELET # BLD: 302 THOU/MM3 (ref 130–400)
PMV BLD AUTO: 8.3 FL (ref 9.4–12.4)
POTASSIUM SERPL-SCNC: 3.9 MEQ/L (ref 3.5–5.2)
PREGNANCY, SERUM: NEGATIVE
PROTEIN UA: NEGATIVE
RBC # BLD: 3.76 MILL/MM3 (ref 4.2–5.4)
RBC URINE: ABNORMAL /HPF
RENAL EPITHELIAL, UA: ABNORMAL
SALICYLATE, SERUM: < 0.3 MG/DL (ref 2–10)
SEG NEUTROPHILS: 71.7 %
SEGMENTED NEUTROPHILS ABSOLUTE COUNT: 7.5 THOU/MM3 (ref 1.8–7.7)
SODIUM BLD-SCNC: 139 MEQ/L (ref 135–145)
SPECIFIC GRAVITY, URINE: 1.02 (ref 1–1.03)
TOTAL PROTEIN: 6.6 G/DL (ref 6.1–8)
TSH SERPL DL<=0.05 MIU/L-ACNC: 1.2 UIU/ML (ref 0.4–4.2)
UROBILINOGEN, URINE: 0.2 EU/DL (ref 0–1)
WBC # BLD: 10.5 THOU/MM3 (ref 4.8–10.8)
WBC UA: ABNORMAL /HPF
YEAST: ABNORMAL

## 2020-05-04 PROCEDURE — 2709999900 HC NON-CHARGEABLE SUPPLY

## 2020-05-04 PROCEDURE — G0480 DRUG TEST DEF 1-7 CLASSES: HCPCS

## 2020-05-04 PROCEDURE — 84703 CHORIONIC GONADOTROPIN ASSAY: CPT

## 2020-05-04 PROCEDURE — 85025 COMPLETE CBC W/AUTO DIFF WBC: CPT

## 2020-05-04 PROCEDURE — 81001 URINALYSIS AUTO W/SCOPE: CPT

## 2020-05-04 PROCEDURE — 80053 COMPREHEN METABOLIC PANEL: CPT

## 2020-05-04 PROCEDURE — 84443 ASSAY THYROID STIM HORMONE: CPT

## 2020-05-04 PROCEDURE — 99284 EMERGENCY DEPT VISIT MOD MDM: CPT

## 2020-05-04 PROCEDURE — 82248 BILIRUBIN DIRECT: CPT

## 2020-05-04 PROCEDURE — 80307 DRUG TEST PRSMV CHEM ANLYZR: CPT

## 2020-05-04 PROCEDURE — 36415 COLL VENOUS BLD VENIPUNCTURE: CPT

## 2020-05-04 ASSESSMENT — SLEEP AND FATIGUE QUESTIONNAIRES
DO YOU HAVE DIFFICULTY SLEEPING: YES
DIFFICULTY STAYING ASLEEP: YES
SLEEP PATTERN: RESTLESSNESS
DO YOU USE A SLEEP AID: NO
DIFFICULTY FALLING ASLEEP: NO
DIFFICULTY ARISING: YES
RESTFUL SLEEP: NO

## 2020-05-04 ASSESSMENT — PAIN SCALES - GENERAL: PAINLEVEL_OUTOF10: 3

## 2020-05-04 ASSESSMENT — PAIN DESCRIPTION - PAIN TYPE: TYPE: CHRONIC PAIN

## 2020-05-04 ASSESSMENT — PATIENT HEALTH QUESTIONNAIRE - PHQ9: SUM OF ALL RESPONSES TO PHQ QUESTIONS 1-9: 5

## 2020-05-04 NOTE — PROGRESS NOTES
discharged and follow up outpatient. Patient receptive to pan. Informed patients medical provider. Informed patients nurse. Patient has appointment in the morning with [facility].

## 2020-05-06 ASSESSMENT — ENCOUNTER SYMPTOMS
COUGH: 0
BACK PAIN: 0
CHEST TIGHTNESS: 0
RHINORRHEA: 0
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
## 2020-06-14 ENCOUNTER — HOSPITAL ENCOUNTER (EMERGENCY)
Age: 30
Discharge: HOME | DRG: 750 | End: 2020-06-14
Payer: COMMERCIAL

## 2020-06-14 VITALS
BODY MASS INDEX: 24.91 KG/M2 | OXYGEN SATURATION: 96 % | HEIGHT: 66 IN | SYSTOLIC BLOOD PRESSURE: 123 MMHG | RESPIRATION RATE: 16 BRPM | HEART RATE: 77 BPM | DIASTOLIC BLOOD PRESSURE: 77 MMHG | TEMPERATURE: 97.9 F | WEIGHT: 155 LBS

## 2020-06-14 PROCEDURE — 99282 EMERGENCY DEPT VISIT SF MDM: CPT

## 2020-06-14 PROCEDURE — 6370000000 HC RX 637 (ALT 250 FOR IP): Performed by: PHYSICIAN ASSISTANT

## 2020-06-14 RX ORDER — IBUPROFEN 600 MG/1
600 TABLET ORAL EVERY 6 HOURS PRN
Qty: 40 TABLET | Refills: 0 | Status: SHIPPED | OUTPATIENT
Start: 2020-06-14 | End: 2020-06-16

## 2020-06-14 RX ORDER — IBUPROFEN 800 MG/1
800 TABLET ORAL ONCE
Status: COMPLETED | OUTPATIENT
Start: 2020-06-14 | End: 2020-06-14

## 2020-06-14 RX ADMIN — IBUPROFEN 800 MG: 800 TABLET, FILM COATED ORAL at 11:07

## 2020-06-14 ASSESSMENT — ENCOUNTER SYMPTOMS
ABDOMINAL PAIN: 0
BACK PAIN: 0
NAUSEA: 0
SORE THROAT: 0
SHORTNESS OF BREATH: 0
CONSTIPATION: 0
RHINORRHEA: 0
BLOOD IN STOOL: 0
DIARRHEA: 0
VOMITING: 0
COUGH: 0

## 2020-06-14 ASSESSMENT — PAIN SCALES - GENERAL
PAINLEVEL_OUTOF10: 7
PAINLEVEL_OUTOF10: 7

## 2020-06-14 NOTE — ED PROVIDER NOTES
Oral    SpO2: 96%    Weight: 155 lb (70.3 kg)    Height: 5' 5.5\" (1.664 m)        10:42 AM EDT: The patient was seen and evaluated. 1043: Ibuprofen ordered. Patient did not want anything else other than Motrin. She did not want any imaging studies. CRITICAL CARE:   None      CONSULTS:  None     PROCEDURES:  None     FINAL IMPRESSION      1. Left wrist pain          DISPOSITION/PLAN   Discharge     PATIENT REFERRED TO:  CAMMIE Sánchez CNP 38    In 2 days        DISCHARGE MEDICATIONS:  Discharge Medication List as of 6/14/2020 10:44 AM      START taking these medications    Details   !! ibuprofen (IBU) 600 MG tablet Take 1 tablet by mouth every 6 hours as needed for Pain, Disp-40 tablet, R-0Print       !! - Potential duplicate medications found. Please discuss with provider. (Please note that portions of this note were completed with a voice recognition program.  Efforts were made to edit the dictations but occasionally words are mis-transcribed.)    The patient was given an opportunity to see the Emergency Attending. The patient voiced understanding that I was a Mid-LevelProvider and was in agreement with being seen independently by myself. Scribe:  Radames Hoffmann 6/14/20 10:42 AM EDT Scribing for and in the presence of Cornell Curling, Pa-C. Signed by: Alejo Carver, 06/14/20 12:33 PM    Provider:  I personally performed the services described in the documentation, reviewed and edited the documentation which was dictated to the scribe in my presence, and it accurately records my words and actions.     Cornell Curling, PA-C 6/14/20 12:33 PM          WAYNE Gamble  06/14/20 [id number]

## 2020-06-14 NOTE — ED TRIAGE NOTES
Had pain and a small blister to left forearm around the scar around. Pain discribed as a sharp, dull stabbing sensation that comes and going; sometimes goes up to the elbow/shoulder. Is a \"8\" on a 10 scale. Is currently out of pain medication for this.

## 2020-06-16 ENCOUNTER — APPOINTMENT (OUTPATIENT)
Dept: GENERAL RADIOLOGY | Age: 30
DRG: 750 | End: 2020-06-16
Payer: COMMERCIAL

## 2020-06-16 ENCOUNTER — HOSPITAL ENCOUNTER (INPATIENT)
Age: 30
LOS: 3 days | Discharge: HOME | DRG: 750 | End: 2020-06-19
Attending: EMERGENCY MEDICINE | Admitting: PSYCHIATRY & NEUROLOGY
Payer: COMMERCIAL

## 2020-06-16 PROBLEM — F29 PSYCHOSIS (HCC): Status: ACTIVE | Noted: 2020-06-16

## 2020-06-16 LAB
ACETAMINOPHEN LEVEL: < 5 UG/ML (ref 0–20)
ALBUMIN SERPL-MCNC: 4.2 G/DL (ref 3.5–5.1)
ALP BLD-CCNC: 87 U/L (ref 38–126)
ALT SERPL-CCNC: 12 U/L (ref 11–66)
AMPHETAMINE+METHAMPHETAMINE URINE SCREEN: NEGATIVE
ANION GAP SERPL CALCULATED.3IONS-SCNC: 7 MEQ/L (ref 8–16)
AST SERPL-CCNC: 15 U/L (ref 5–40)
BARBITURATE QUANTITATIVE URINE: NEGATIVE
BASOPHILS # BLD: 0.6 %
BASOPHILS ABSOLUTE: 0 THOU/MM3 (ref 0–0.1)
BENZODIAZEPINE QUANTITATIVE URINE: NEGATIVE
BILIRUB SERPL-MCNC: < 0.2 MG/DL (ref 0.3–1.2)
BILIRUBIN DIRECT: < 0.2 MG/DL (ref 0–0.3)
BILIRUBIN URINE: NEGATIVE
BLOOD, URINE: NEGATIVE
BUN BLDV-MCNC: 9 MG/DL (ref 7–22)
CALCIUM SERPL-MCNC: 8.9 MG/DL (ref 8.5–10.5)
CANNABINOID QUANTITATIVE URINE: NEGATIVE
CHARACTER, URINE: CLEAR
CHLORIDE BLD-SCNC: 108 MEQ/L (ref 98–111)
CO2: 23 MEQ/L (ref 23–33)
COCAINE METABOLITE QUANTITATIVE URINE: NEGATIVE
COLOR: YELLOW
CREAT SERPL-MCNC: 0.7 MG/DL (ref 0.4–1.2)
EOSINOPHIL # BLD: 3.4 %
EOSINOPHILS ABSOLUTE: 0.3 THOU/MM3 (ref 0–0.4)
ERYTHROCYTE [DISTWIDTH] IN BLOOD BY AUTOMATED COUNT: 14.4 % (ref 11.5–14.5)
ERYTHROCYTE [DISTWIDTH] IN BLOOD BY AUTOMATED COUNT: 48.1 FL (ref 35–45)
ETHYL ALCOHOL, SERUM: < 0.01 %
GFR SERPL CREATININE-BSD FRML MDRD: > 90 ML/MIN/1.73M2
GLUCOSE BLD-MCNC: 87 MG/DL (ref 70–108)
GLUCOSE, URINE: NEGATIVE MG/DL
HCT VFR BLD CALC: 37 % (ref 37–47)
HEMOGLOBIN: 11.6 GM/DL (ref 12–16)
IMMATURE GRANS (ABS): 0.04 THOU/MM3 (ref 0–0.07)
IMMATURE GRANULOCYTES: 0.5 %
KETONES, URINE: NEGATIVE
LEUKOCYTE EST, POC: NEGATIVE
LYMPHOCYTES # BLD: 30.8 %
LYMPHOCYTES ABSOLUTE: 2.4 THOU/MM3 (ref 1–4.8)
MCH RBC QN AUTO: 28.6 PG (ref 26–33)
MCHC RBC AUTO-ENTMCNC: 31.4 GM/DL (ref 32.2–35.5)
MCV RBC AUTO: 91.4 FL (ref 81–99)
MONOCYTES # BLD: 10.4 %
MONOCYTES ABSOLUTE: 0.8 THOU/MM3 (ref 0.4–1.3)
NITRITE, URINE: NEGATIVE
NUCLEATED RED BLOOD CELLS: 0 /100 WBC
OPIATES, URINE: NEGATIVE
OSMOLALITY CALCULATION: 273.7 MOSMOL/KG (ref 275–300)
OXYCODONE: NEGATIVE
PH UA: 6.5 (ref 5–9)
PHENCYCLIDINE QUANTITATIVE URINE: NEGATIVE
PLATELET # BLD: 317 THOU/MM3 (ref 130–400)
PMV BLD AUTO: 8.8 FL (ref 9.4–12.4)
POTASSIUM REFLEX MAGNESIUM: 3.7 MEQ/L (ref 3.5–5.2)
PREGNANCY, SERUM: NEGATIVE
PROTEIN UA: NEGATIVE MG/DL
RBC # BLD: 4.05 MILL/MM3 (ref 4.2–5.4)
SALICYLATE, SERUM: < 0.3 MG/DL (ref 2–10)
SEG NEUTROPHILS: 54.3 %
SEGMENTED NEUTROPHILS ABSOLUTE COUNT: 4.3 THOU/MM3 (ref 1.8–7.7)
SODIUM BLD-SCNC: 138 MEQ/L (ref 135–145)
SPECIFIC GRAVITY UA: < 1.005 (ref 1–1.03)
TOTAL PROTEIN: 7 G/DL (ref 6.1–8)
UROBILINOGEN, URINE: 0.2 EU/DL (ref 0–1)
WBC # BLD: 7.9 THOU/MM3 (ref 4.8–10.8)

## 2020-06-16 PROCEDURE — 80048 BASIC METABOLIC PNL TOTAL CA: CPT

## 2020-06-16 PROCEDURE — 85025 COMPLETE CBC W/AUTO DIFF WBC: CPT

## 2020-06-16 PROCEDURE — 1240000000 HC EMOTIONAL WELLNESS R&B

## 2020-06-16 PROCEDURE — 71045 X-RAY EXAM CHEST 1 VIEW: CPT

## 2020-06-16 PROCEDURE — 6370000000 HC RX 637 (ALT 250 FOR IP): Performed by: PSYCHIATRY & NEUROLOGY

## 2020-06-16 PROCEDURE — G0480 DRUG TEST DEF 1-7 CLASSES: HCPCS

## 2020-06-16 PROCEDURE — 80307 DRUG TEST PRSMV CHEM ANLYZR: CPT

## 2020-06-16 PROCEDURE — 99283 EMERGENCY DEPT VISIT LOW MDM: CPT

## 2020-06-16 PROCEDURE — 36415 COLL VENOUS BLD VENIPUNCTURE: CPT

## 2020-06-16 PROCEDURE — 80076 HEPATIC FUNCTION PANEL: CPT

## 2020-06-16 PROCEDURE — 81003 URINALYSIS AUTO W/O SCOPE: CPT

## 2020-06-16 PROCEDURE — 84703 CHORIONIC GONADOTROPIN ASSAY: CPT

## 2020-06-16 RX ORDER — DIVALPROEX SODIUM 500 MG/1
1000 TABLET, EXTENDED RELEASE ORAL NIGHTLY
Status: ON HOLD | COMMUNITY
End: 2020-06-19 | Stop reason: HOSPADM

## 2020-06-16 RX ORDER — LANOLIN ALCOHOL/MO/W.PET/CERES
5 CREAM (GRAM) TOPICAL NIGHTLY PRN
Status: DISCONTINUED | OUTPATIENT
Start: 2020-06-16 | End: 2020-06-19 | Stop reason: HOSPADM

## 2020-06-16 RX ORDER — DIVALPROEX SODIUM 250 MG/1
500 TABLET, EXTENDED RELEASE ORAL DAILY
Status: ON HOLD | COMMUNITY
End: 2020-06-19 | Stop reason: HOSPADM

## 2020-06-16 RX ORDER — DIVALPROEX SODIUM 500 MG/1
500 TABLET, EXTENDED RELEASE ORAL DAILY
Status: DISCONTINUED | OUTPATIENT
Start: 2020-06-17 | End: 2020-06-19 | Stop reason: HOSPADM

## 2020-06-16 RX ORDER — HYDROXYZINE HYDROCHLORIDE 25 MG/1
50 TABLET, FILM COATED ORAL 3 TIMES DAILY PRN
Status: DISCONTINUED | OUTPATIENT
Start: 2020-06-16 | End: 2020-06-19 | Stop reason: HOSPADM

## 2020-06-16 RX ORDER — TRAZODONE HYDROCHLORIDE 50 MG/1
50 TABLET ORAL NIGHTLY PRN
Status: ON HOLD | COMMUNITY
End: 2020-06-16 | Stop reason: ALTCHOICE

## 2020-06-16 RX ORDER — ACETAMINOPHEN 325 MG/1
650 TABLET ORAL EVERY 4 HOURS PRN
Status: DISCONTINUED | OUTPATIENT
Start: 2020-06-16 | End: 2020-06-19 | Stop reason: HOSPADM

## 2020-06-16 RX ORDER — ARIPIPRAZOLE LAUROXIL 882 MG/3.2ML
882 INJECTION, SUSPENSION, EXTENDED RELEASE INTRAMUSCULAR
COMMUNITY

## 2020-06-16 RX ORDER — CHOLECALCIFEROL (VITAMIN D3) 125 MCG
5 CAPSULE ORAL NIGHTLY PRN
COMMUNITY

## 2020-06-16 RX ORDER — NICOTINE 21 MG/24HR
1 PATCH, TRANSDERMAL 24 HOURS TRANSDERMAL DAILY
Status: DISCONTINUED | OUTPATIENT
Start: 2020-06-16 | End: 2020-06-16

## 2020-06-16 RX ORDER — IBUPROFEN 200 MG
400 TABLET ORAL EVERY 6 HOURS PRN
Status: DISCONTINUED | OUTPATIENT
Start: 2020-06-16 | End: 2020-06-19 | Stop reason: HOSPADM

## 2020-06-16 RX ORDER — MAGNESIUM HYDROXIDE/ALUMINUM HYDROXICE/SIMETHICONE 120; 1200; 1200 MG/30ML; MG/30ML; MG/30ML
30 SUSPENSION ORAL EVERY 6 HOURS PRN
Status: DISCONTINUED | OUTPATIENT
Start: 2020-06-16 | End: 2020-06-19 | Stop reason: HOSPADM

## 2020-06-16 RX ORDER — LANOLIN ALCOHOL/MO/W.PET/CERES
5 CREAM (GRAM) TOPICAL NIGHTLY PRN
Status: ON HOLD | COMMUNITY
End: 2020-06-19 | Stop reason: HOSPADM

## 2020-06-16 RX ORDER — TRAZODONE HYDROCHLORIDE 50 MG/1
50 TABLET ORAL NIGHTLY PRN
Status: DISCONTINUED | OUTPATIENT
Start: 2020-06-16 | End: 2020-06-16

## 2020-06-16 RX ORDER — DIVALPROEX SODIUM 500 MG/1
1000 TABLET, EXTENDED RELEASE ORAL NIGHTLY
Status: DISCONTINUED | OUTPATIENT
Start: 2020-06-16 | End: 2020-06-19 | Stop reason: HOSPADM

## 2020-06-16 RX ADMIN — DIVALPROEX SODIUM 1000 MG: 500 TABLET, EXTENDED RELEASE ORAL at 22:25

## 2020-06-16 RX ADMIN — Medication 4.5 MG: at 22:25

## 2020-06-16 ASSESSMENT — ENCOUNTER SYMPTOMS
DIARRHEA: 0
WHEEZING: 0
ABDOMINAL PAIN: 0
VOMITING: 0
NAUSEA: 0
SINUS PRESSURE: 0
SHORTNESS OF BREATH: 0
EYE REDNESS: 0
SORE THROAT: 0
CHEST TIGHTNESS: 0
CONSTIPATION: 0
RHINORRHEA: 0
BACK PAIN: 0
ABDOMINAL DISTENTION: 0

## 2020-06-16 ASSESSMENT — PATIENT HEALTH QUESTIONNAIRE - PHQ9
SUM OF ALL RESPONSES TO PHQ QUESTIONS 1-9: 6
SUM OF ALL RESPONSES TO PHQ QUESTIONS 1-9: 6

## 2020-06-16 ASSESSMENT — SLEEP AND FATIGUE QUESTIONNAIRES
RESTFUL SLEEP: NO
SLEEP PATTERN: DIFFICULTY FALLING ASLEEP;DISTURBED/INTERRUPTED SLEEP
AVERAGE NUMBER OF SLEEP HOURS: 4
DO YOU USE A SLEEP AID: YES
DO YOU HAVE DIFFICULTY SLEEPING: YES
DIFFICULTY ARISING: NO
DIFFICULTY FALLING ASLEEP: YES
DIFFICULTY STAYING ASLEEP: YES
AVERAGE NUMBER OF SLEEP HOURS: 6
DO YOU HAVE DIFFICULTY SLEEPING: NO
DO YOU USE A SLEEP AID: YES

## 2020-06-16 ASSESSMENT — PAIN DESCRIPTION - FREQUENCY: FREQUENCY: INTERMITTENT

## 2020-06-16 ASSESSMENT — PAIN - FUNCTIONAL ASSESSMENT: PAIN_FUNCTIONAL_ASSESSMENT: ACTIVITIES ARE NOT PREVENTED

## 2020-06-16 ASSESSMENT — PAIN DESCRIPTION - PROGRESSION: CLINICAL_PROGRESSION: NOT CHANGED

## 2020-06-16 ASSESSMENT — PAIN DESCRIPTION - LOCATION: LOCATION: ARM

## 2020-06-16 ASSESSMENT — PAIN DESCRIPTION - DESCRIPTORS: DESCRIPTORS: ACHING

## 2020-06-16 ASSESSMENT — PAIN DESCRIPTION - ONSET: ONSET: ON-GOING

## 2020-06-16 ASSESSMENT — PAIN DESCRIPTION - ORIENTATION: ORIENTATION: LEFT

## 2020-06-16 ASSESSMENT — PAIN SCALES - GENERAL
PAINLEVEL_OUTOF10: 0
PAINLEVEL_OUTOF10: 2

## 2020-06-16 ASSESSMENT — LIFESTYLE VARIABLES: HISTORY_ALCOHOL_USE: NO

## 2020-06-16 ASSESSMENT — PAIN DESCRIPTION - PAIN TYPE: TYPE: CHRONIC PAIN

## 2020-06-16 NOTE — ED NOTES
Pt provided with box lunch and mary mist. Pt denies other needs at this time.       Nilda Mancilla RN  06/16/20 [id number]

## 2020-06-16 NOTE — ED PROVIDER NOTES
deficit. Sensory: No sensory deficit. Motor: No abnormal muscle tone or seizure activity. Coordination: Coordination normal.   Psychiatric:         Mood and Affect: Mood is anxious. Affect is labile. Speech: Speech normal.         Behavior: Behavior is cooperative. Thought Content: Thought content is paranoid and delusional.         DIAGNOSTIC RESULTS     EKG:(none if blank)  All [facility] who either signs or Co-signs this chart in the absence of a cardiologist.    none    RADIOLOGY: (none if blank)   Interpretation per the Radiologistbelow, if available at the time of this note:    XR CHEST PORTABLE   Final Result   Curvilinear opacity at the left lung base may represent the edge of the diaphragm with stomach gas subjacent or left basilar atelectasis. **This report has been created using voice recognition software. It may contain minor errors which are inherent in voice recognition technology. **      Final report electronically signed by Dr. Sandy Wade MD on 6/16/2020 5:58 PM          LABS:  Labs Reviewed   CBC WITH AUTO DIFFERENTIAL   BASIC METABOLIC PANEL W/ REFLEX TO MG FOR LOW K   HEPATIC FUNCTION PANEL   HCG, SERUM, QUALITATIVE   URINALYSIS   ETHANOL   URINE DRUG SCREEN       All other labs were within normal range or not returned as of this dictation. Please note, any cultures that may have been sent were not resulted at the time of this patient visit. EMERGENCY DEPARTMENT COURSE andMedical Decision Making:     MDM/   Based on physical exam, clinical presentation, and laboratory evaluation there is no medical reason to prevent him from a psychiatric hospitazation. ED Medications administered this visit:  Medications - No data to display      Procedures: (None if blank)       CLINICAL       1.  Psychosis, unspecified psychosis type ([facility] [address]    2. Schizoaffective disorder, unspecified type ([facility] [address] DISPOSITION/PLAN   DISPOSITION    admit    PATIENT REFERRED TO:  No follow-up provider specified.     DISCHARGE MEDICATIONS:  New Prescriptions    No medications on file              (Please note that portions of this note were completed with a voice recognition program.  Efforts were made to edit the dictations but occasionallywords are mis-transcribed.)      Adra Fails, DO, (electronically signed)  Attending Physician, Emergency Department        Adra Fails, DO  06/17/20 [id number]

## 2020-06-16 NOTE — ED NOTES
ED to inpatient nurses report    Chief Complaint   Patient presents with    Psychiatric Evaluation      Present to ED from home  LOC: alert and orientated to name and place  Vital signs   Vitals:    06/16/20 1632 06/16/20 1825   BP: 125/62 135/85   Pulse: 89 84   Resp: 16 18   Temp: 99.2 °F (37.3 °C)    TempSrc: Oral    SpO2: 98% 97%      Oxygen Baseline room air    Current needs required none Bipap/Cpap No  LDAs:    Mobility: Independent  Pending ED orders: none  Present condition: pt is calm and cooperative. Pt continues to walk around the room mumbling incoherent sentences but is cooperative with staff.     Electronically signed by Pepper Courtney RN on 6/16/2020 at Eris Bullard RN  06/16/20 [id number]

## 2020-06-16 NOTE — ED NOTES
Pt is pacing around the room mumbling incoherent sentences, Dr. Pedro Webb notified. Pt is calm and cooperative. Pt denies needs at this time. Pt denies pain. Pt informed on updated plan of care. VSS. Call light in reach, will continue to monitor.       Fred Bray RN  06/16/20 [id number]
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
## 2020-06-17 PROBLEM — F25.0 SCHIZOAFFECTIVE DISORDER, BIPOLAR TYPE (HCC): Status: ACTIVE | Noted: 2020-06-16

## 2020-06-17 PROCEDURE — 99223 1ST HOSP IP/OBS HIGH 75: CPT | Performed by: PSYCHIATRY & NEUROLOGY

## 2020-06-17 PROCEDURE — 1240000000 HC EMOTIONAL WELLNESS R&B

## 2020-06-17 PROCEDURE — APPSS30 APP SPLIT SHARED TIME 16-30 MINUTES: Performed by: PHYSICIAN ASSISTANT

## 2020-06-17 PROCEDURE — 6370000000 HC RX 637 (ALT 250 FOR IP): Performed by: PSYCHIATRY & NEUROLOGY

## 2020-06-17 PROCEDURE — 6370000000 HC RX 637 (ALT 250 FOR IP): Performed by: PHYSICIAN ASSISTANT

## 2020-06-17 RX ORDER — ARIPIPRAZOLE 10 MG/1
10 TABLET ORAL DAILY
Status: DISCONTINUED | OUTPATIENT
Start: 2020-06-17 | End: 2020-06-18

## 2020-06-17 RX ADMIN — DIVALPROEX SODIUM 1000 MG: 500 TABLET, EXTENDED RELEASE ORAL at 20:58

## 2020-06-17 RX ADMIN — ARIPIPRAZOLE 10 MG: 10 TABLET ORAL at 17:10

## 2020-06-17 RX ADMIN — Medication 4.5 MG: at 20:58

## 2020-06-17 RX ADMIN — DIVALPROEX SODIUM 500 MG: 500 TABLET, EXTENDED RELEASE ORAL at 09:54

## 2020-06-17 ASSESSMENT — PAIN DESCRIPTION - PROGRESSION: CLINICAL_PROGRESSION: NOT CHANGED

## 2020-06-17 ASSESSMENT — SLEEP AND FATIGUE QUESTIONNAIRES
AVERAGE NUMBER OF SLEEP HOURS: 4
DIFFICULTY ARISING: NO
DO YOU USE A SLEEP AID: YES
SLEEP PATTERN: DIFFICULTY FALLING ASLEEP;RESTLESSNESS;DISTURBED/INTERRUPTED SLEEP
DIFFICULTY FALLING ASLEEP: YES
RESTFUL SLEEP: NO
DO YOU HAVE DIFFICULTY SLEEPING: YES
DIFFICULTY STAYING ASLEEP: YES

## 2020-06-17 ASSESSMENT — PAIN DESCRIPTION - FREQUENCY: FREQUENCY: INTERMITTENT

## 2020-06-17 ASSESSMENT — PAIN DESCRIPTION - ONSET: ONSET: ON-GOING

## 2020-06-17 ASSESSMENT — PAIN SCALES - GENERAL: PAINLEVEL_OUTOF10: 3

## 2020-06-17 ASSESSMENT — PAIN - FUNCTIONAL ASSESSMENT: PAIN_FUNCTIONAL_ASSESSMENT: ACTIVITIES ARE NOT PREVENTED

## 2020-06-17 ASSESSMENT — PAIN DESCRIPTION - PAIN TYPE: TYPE: CHRONIC PAIN

## 2020-06-17 ASSESSMENT — PAIN DESCRIPTION - DESCRIPTORS: DESCRIPTORS: ACHING;OTHER (COMMENT)

## 2020-06-17 ASSESSMENT — PAIN DESCRIPTION - ORIENTATION: ORIENTATION: LEFT

## 2020-06-17 ASSESSMENT — PAIN DESCRIPTION - LOCATION: LOCATION: WRIST
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
## 2020-06-17 NOTE — PLAN OF CARE
Problem: KNOWLEDGE DEFICIT,EDUCATION,DISCHARGE PLAN  Goal: Knowledge - personal safety  6/17/2020 1757 by Janie Shipman LPN  Outcome: Completed  Note: 1. Warning signs that happen when I am distressed or experience harmful thoughts   Shake, speed up talking, can't sit still  2. Activities that I can do to cope in a healthy way when I am stressed or distressed   Exercise, listen to music  3. List of roadblocks that keep me from using healthy coping skills  My mind, other people  4. List of my support persons  Myself     EMERGENCY CONTACTS:  -National suicide prevention life line 1-993-651-122-231-7329  -24 hour crisis line 1-538-HOPE (5111)  -Domestic violence hotline 3-698-575-SAFE (46 064 743)  -Test CONNECT to 446518 to chat with a trained crisis counselor 24 hours/day 7 days a week  -CALL 911    6/17/2020 1352 by Janie Shipman LPN  Outcome: Ongoing  Note: Patient did not complete safety plan at this time during shift.

## 2020-06-17 NOTE — PLAN OF CARE
Problem: Discharge Planning:  Goal: Discharged to appropriate level of care  Description: Discharged to appropriate level of care  Outcome: Ongoing  Note: No discharge plans noted at this time during shift. Problem: Pain:  Goal: Pain level will decrease  Description: Pain level will decrease  Outcome: Ongoing  Note: Patient reports pain in left wrist. Rates pain 3/10. Describes pain as stinging and aching. PRN medication available if needed. Goal: Control of acute pain  Description: Control of acute pain  Outcome: Ongoing  Note: Patient reports pain in left wrist. Rates pain 3/10. Describes pain as stinging and aching. PRN medication available if needed. Goal: Control of chronic pain  Description: Control of chronic pain  Outcome: Ongoing  Note: Patient reports pain in left wrist. Rates pain 3/10. Describes pain as stinging and aching. PRN medication available if needed. Problem: KNOWLEDGE DEFICIT,EDUCATION,DISCHARGE PLAN  Goal: Knowledge - personal safety  Outcome: Ongoing  Note: Patient did not complete safety plan at this time during shift. Problem: Altered Mood, Psychotic Behavior:  Goal: Able to demonstrate trust by eating, participating in treatment and following staff's direction  Description: Able to demonstrate trust by eating, participating in treatment and following staff's direction  Outcome: Ongoing  Note: Patient eating meals without difficulty, participating in treatment and follow's directions without difficulty. Goal: Able to verbalize decrease in frequency and intensity of hallucinations  Description: Able to verbalize decrease in frequency and intensity of hallucinations  Outcome: Ongoing  Note: Patient denies hallucinations during shift assessment. Goal: Able to verbalize reality based thinking  Description: Able to verbalize reality based thinking  Outcome: Ongoing  Note: Patient continues to work on verbalization of reality based thinking.  Patient denies delusional thoughts

## 2020-06-17 NOTE — PROGRESS NOTES
This RN has reviewed and agrees with Paul Ramírez LPN's data collection and has collaborated with this LPN regarding the patient's care plan

## 2020-06-17 NOTE — PROGRESS NOTES
Group Therapy Note    Date: 6/17/2020  Start Time: 1330  End Time:  1430  Number of Participants: 9    Type of Group: Psychotherapy      Notes:  Pt is present for group with active participation. Peers discussed emotions with a focus on emotional recovery and wellness. Identified the role  In which self defeating beliefs contribute to emotional distress. Status After Intervention:  Improved    Participation Level:  Active Listener and Interactive    Participation Quality: Appropriate, Attentive, Sharing       Speech: Disorganized      Thought Process/Content: Linear, disorganized      Affective Functioning: Congruent      Mood: Labilec      Level of consciousness:  Alert, Oriented x4 and Attentive      Response to Learning: Able to verbalize current knowledge/experience,  Capable of insight, Able to change behavior and Progressing to goal      Endings: None Reported    Modes of Intervention: Education, Support, Socialization, Exploration, Clarifying and Problem-solving      Discipline Responsible: /Counselor      Signature:  REE Siddiqui

## 2020-06-18 PROCEDURE — APPSS30 APP SPLIT SHARED TIME 16-30 MINUTES: Performed by: PHYSICIAN ASSISTANT

## 2020-06-18 PROCEDURE — 1240000000 HC EMOTIONAL WELLNESS R&B

## 2020-06-18 PROCEDURE — 6370000000 HC RX 637 (ALT 250 FOR IP): Performed by: PHYSICIAN ASSISTANT

## 2020-06-18 PROCEDURE — 99232 SBSQ HOSP IP/OBS MODERATE 35: CPT | Performed by: PSYCHIATRY & NEUROLOGY

## 2020-06-18 PROCEDURE — 90833 PSYTX W PT W E/M 30 MIN: CPT | Performed by: PSYCHIATRY & NEUROLOGY

## 2020-06-18 PROCEDURE — 6370000000 HC RX 637 (ALT 250 FOR IP): Performed by: PSYCHIATRY & NEUROLOGY

## 2020-06-18 RX ORDER — ARIPIPRAZOLE 15 MG/1
15 TABLET ORAL DAILY
Status: DISCONTINUED | OUTPATIENT
Start: 2020-06-19 | End: 2020-06-19 | Stop reason: HOSPADM

## 2020-06-18 RX ORDER — ARIPIPRAZOLE 5 MG/1
5 TABLET ORAL ONCE
Status: COMPLETED | OUTPATIENT
Start: 2020-06-18 | End: 2020-06-18

## 2020-06-18 RX ADMIN — ACETAMINOPHEN 650 MG: 325 TABLET ORAL at 06:49

## 2020-06-18 RX ADMIN — DIVALPROEX SODIUM 500 MG: 500 TABLET, EXTENDED RELEASE ORAL at 09:38

## 2020-06-18 RX ADMIN — ARIPIPRAZOLE 10 MG: 10 TABLET ORAL at 09:38

## 2020-06-18 RX ADMIN — DIVALPROEX SODIUM 1000 MG: 500 TABLET, EXTENDED RELEASE ORAL at 21:12

## 2020-06-18 RX ADMIN — ARIPIPRAZOLE 5 MG: 5 TABLET ORAL at 14:30

## 2020-06-18 RX ADMIN — Medication 4.5 MG: at 21:13

## 2020-06-18 RX ADMIN — HYDROXYZINE HYDROCHLORIDE 50 MG: 25 TABLET ORAL at 09:38

## 2020-06-18 ASSESSMENT — PAIN DESCRIPTION - LOCATION
LOCATION: WRIST
LOCATION: WRIST

## 2020-06-18 ASSESSMENT — PAIN SCALES - GENERAL
PAINLEVEL_OUTOF10: 0
PAINLEVEL_OUTOF10: 3
PAINLEVEL_OUTOF10: 5

## 2020-06-18 ASSESSMENT — PAIN DESCRIPTION - PROGRESSION
CLINICAL_PROGRESSION: NOT CHANGED
CLINICAL_PROGRESSION: NOT CHANGED

## 2020-06-18 ASSESSMENT — PAIN DESCRIPTION - ONSET
ONSET: GRADUAL
ONSET: ON-GOING

## 2020-06-18 ASSESSMENT — PAIN DESCRIPTION - FREQUENCY
FREQUENCY: INTERMITTENT
FREQUENCY: INTERMITTENT

## 2020-06-18 ASSESSMENT — PAIN DESCRIPTION - ORIENTATION
ORIENTATION: LEFT
ORIENTATION: LEFT

## 2020-06-18 ASSESSMENT — PAIN DESCRIPTION - DESCRIPTORS
DESCRIPTORS: ACHING;DISCOMFORT
DESCRIPTORS: ACHING;DISCOMFORT

## 2020-06-18 ASSESSMENT — PAIN - FUNCTIONAL ASSESSMENT
PAIN_FUNCTIONAL_ASSESSMENT: ACTIVITIES ARE NOT PREVENTED
PAIN_FUNCTIONAL_ASSESSMENT: ACTIVITIES ARE NOT PREVENTED

## 2020-06-18 ASSESSMENT — PAIN DESCRIPTION - PAIN TYPE
TYPE: CHRONIC PAIN
TYPE: CHRONIC PAIN

## 2020-06-18 NOTE — PROGRESS NOTES
(L) 06/16/2020    HCT 37.0 06/16/2020     06/16/2020    CHOL 134 08/16/2019    TRIG 83 08/16/2019    HDL 37 (L) 08/16/2019    ALT 12 06/16/2020    AST 15 06/16/2020     06/16/2020    K 3.7 06/16/2020     06/16/2020    CREATININE 0.7 06/16/2020    BUN 9 06/16/2020    CO2 23 06/16/2020    TSH 1.200 05/04/2020          Mental Status Exam:   Level of consciousness:  within normal limits  Appearance:  well-appearing, hospital attire, in chair, disheveled  Behavior/Motor:  no abnormalities noted  Attitude toward examiner:  cooperative, attentive and good eye contact  Speech:  spontaneous, normal rate and normal volume  Mood:  Euthymic  Affect:  reactive  Thought processes:  linear, coherent  Thought content:  Denies homicidal ideation  Suicidal Ideation:  denies suicidal ideation  Delusions:  paranoid and persecutory  Perceptual Disturbance:  denies any perceptual disturbance  Cognition: Patient is oriented to person, place, time. Somewhat to situation  Concentration: clinically adequate  Memory: intact  Insight & Judgement: limited     ASSESSMENT     Schizoaffective disorder, bipolar type ([facility] [address]     PLAN    Patient's symptoms show some improvement today  Increase Abilify to 15mg daily  Attempt to develop insight, psycho-education and supportive therapy conducted. Probable discharge: 1-2 days  Follow-up: Alexis Lazaro is a 34 y.o. female being evaluated by a Virtual Visit (video visit) encounter to address concerns as mentioned above. A caregiver was present in the room along with the patient. Pursuant to the emergency declaration under the [facility], [address] [place] [address] authority and the [employer] and [employer] Act, this Virtual Visit was conducted with patient's (and/or legal guardian's) consent, to reduce the patient's risk of exposure to COVID-19 and provide necessary medical care. Services were provided through a video synchronous discussion virtually to substitute for in-person visit by provider. Patient is present at [address] [place] [address] on [address] and I am physically present at my home in [address] on 6/18/2020 at 9:13 AM     An electronic signature was used to authenticate this note. Psychiatry Attending Attestation     I assessed this patient and reviewed the case and [facility] I have reviewed the above documentation and I agree with the findings and treatment plan with the following updates. Patient reports that she is doing better today. No longer is paranoid that people are trying to poison her. Denies any auditory or visual hallucinations today. Staff reports that she is future oriented and is hopeful about her recovery. Tolerating medication adjustments well and denies any side effect from the medication. Patient reports that she is feeling less anxious now and is looking forward to go back to the [facility] home. We will discharge her soon if she continues to improve. More than 16 mins of the session was spent doing Supportive psychotherapy. Session lasted for over 30 mins. Livier Montez is a 34 y.o. female being evaluated by a Virtual Visit (video visit) encounter to address concerns as mentioned above. A caregiver was present in the room along with the patient. Pursuant to the emergency declaration under the [facility], [address] [place] [address] authority and the [employer] and [employer] Act, this Virtual Visit was conducted with patient's (and/or legal guardian's) consent, to reduce the patient's risk of exposure to COVID-19 and provide necessary medical care. Services were provided through a video synchronous discussion virtually to substitute for in-person visit by provider.    Patient is present at [facility] on [address] and I am physically present at my home in [facility]     --Braulio Vgeas MD on 6/18/2020 at 4:24 PM    An electronic signature was used to authenticate this note. **This report has been created using voice recognition software. It may contain minor errors which are inherent in voice recognition technology. **

## 2020-06-18 NOTE — PROGRESS NOTES
Group Therapy Note    Date: 6/18/2020  Start Time: 1330  End Time:  1430  Number of Participants: 10    Type of Group: Psychotherapy      Notes:  Pt is present for group. Peers introduced self to new group members and shared the events and experiences related to this admission. Peers also identified examples of their own self defeating beliefs which reinforce false stereo types contributing to their own resistance to seeking help which they know that they are in need of. Identified daily examples of how we rely on others directly and indirectly and the positive outcome of those activities. Status After Intervention:  Improved    Participation Level: Active Listener and Interactive    Participation Quality: Appropriate, Attentive, Sharing and Supportive      Speech:  Less disorganized today      Thought Process/Content: Logical  Linear-Less disorganized today. Affective Functioning: Congruent      Mood: Dysphoric but improving.        Level of consciousness:  Drowsy, Oriented x4 and Attentive      Response to Learning: Able to verbalize current knowledge/experience, Able to verbalize/acknowledge new learning, Able to retain information, Capable of insight, Able to change behavior and Progressing to goal      Endings: None Reported    Modes of Intervention: Education, Support, Socialization, Exploration, Clarifying and Activity      Discipline Responsible: /Counselor      Signature:  REE Agarwal

## 2020-06-18 NOTE — PLAN OF CARE
Problem: Discharge Planning:  Goal: Discharged to appropriate level of care  Description: Discharged to appropriate level of care  Outcome: Ongoing  Note: Patient voices no needs before discharge. Patient plans to be discharged to home with roommates. Discharge planner working with patient to achieve optimal discharge plans, specific to individual needs. Problem: Pain:  Goal: Pain level will decrease  Description: Pain level will decrease  Outcome: Ongoing  Note: Pain Assessment: 0-10  Pain Level: 3   Patient's Stated Pain Goal: No pain   Is pain goal met at this time? Yes   Patient reports chronic left wrist pain, declines the need for medication  Non-Pharmaceutical Pain Intervention(s): Declines       Problem: Altered Mood, Psychotic Behavior:  Goal: Able to demonstrate trust by eating, participating in treatment and following staff's direction  Description: Able to demonstrate trust by eating, participating in treatment and following staff's direction  Outcome: Ongoing  Note: Patient taking medication and following staff's directions. Problem: Altered Mood, Psychotic Behavior:  Goal: Able to verbalize decrease in frequency and intensity of hallucinations  Description: Able to verbalize decrease in frequency and intensity of hallucinations  Outcome: Ongoing  Note: Patient denies hallucinations at present time. Problem: Altered Mood, Psychotic Behavior:  Goal: Able to verbalize reality based thinking  Description: Able to verbalize reality based thinking  Outcome: Ongoing  Note: Patient alert and oriented x 3, not to situation. Problem: Altered Mood, Psychotic Behavior:  Goal: Absence of self-harm  Description: Absence of self-harm  Outcome: Ongoing  Note: No self harm behaviors were observed or reported so far this shift. Remains on every 15 minutes precautions for safety.        Problem: Altered Mood, Psychotic Behavior:  Goal: Ability to achieve adequate nutritional intake will

## 2020-06-18 NOTE — GROUP NOTE
Group Therapy Note    Date: 6/18/2020    Group Start Time: 1100  Group End Time: 2852  Group Topic: Healthy Living/Wellness    [facility]    Carlos A Wolf RN       Patient did not attend nursing group.

## 2020-06-18 NOTE — BH NOTE
PLAN OF CARE:     Start Time: 0900  End Time:   0930    Group Topic:  Daily Goals    Group Type:   Goal Group    Intervention/Goal:  To increase support and identify daily goals    Attendance:  Attended group    Affect:   Brightens with interaction    Behavior:  Cooperative and pleasant    Response:  Identified a daily goal with assist.     Daily Goal:  To talk to the doctor and to come to groups.     Progress:  Progressing to goal
PLAN OF CARE:     Start Time: 1000  End Time:   1100    Group Topic:  Sand Art    Group Type:   Recreation/Social Group    Intervention/Goal:  To increase socialization and concentration. Attendance:   Attended group    Affect:    bright    Behavior:   Cooperative and pleasant    Response:    Appropriate - Patient participated in a sand art activity and also in some pet trivia. Patient demonstrated good concentration and socialization.
No problems noted  VERBAL COMMUNICATION:    Guarded at this time  WRITTEN COMMUNICATION:   No problems noted    COORDINATION:   No problems noted  MOBILITY:   Ambulates independently      GOALS:   Increase socialization by attending groups on the unit daily.

## 2020-06-18 NOTE — PLAN OF CARE
hallucinations  Description: Able to verbalize decrease in frequency and intensity of hallucinations  6/17/2020 2244 by Shruthi Landa RN  Outcome: Met This Shift  Note: Patient denies auditory and/or visual hallucinations this shift. 6/17/2020 1352 by Lb Arreaga LPN  Outcome: Ongoing  Note: Patient denies hallucinations during shift assessment. Goal: Absence of self-harm  Description: Absence of self-harm  6/17/2020 2244 by Shruthi Landa RN  Outcome: Met This Shift  Note: Patient remains free from self-harm  6/17/2020 1352 by Lb Arreaga LPN  Outcome: Ongoing  Note: Patient remains free from self-harming behavior at this time during shift. Goal: Ability to achieve adequate nutritional intake will improve  Description: Ability to achieve adequate nutritional intake will improve  6/17/2020 2244 by Shruthi Landa RN  Outcome: Met This Shift  Note: Patient had a bedtime snack this shift. 6/17/2020 1352 by Lb Arreaga LPN  Outcome: Ongoing  Note: Good appetite noted during shift. Goal: Ability to interact with others will improve  Description: Ability to interact with others will improve  6/17/2020 2244 by Shruthi Landa RN  Outcome: Met This Shift  Note: Patient interacts well with her peers. 6/17/2020 1352 by Lb Arreaga LPN  Outcome: Ongoing  Note: Patient able to have appropriate interaction with peers when out on unit during shift. Goal: Compliance with prescribed medication regimen will improve  Description: Compliance with prescribed medication regimen will improve  6/17/2020 2244 by Shruthi Landa RN  Outcome: Met This Shift  Note: Patient took her medications as prescribed. 6/17/2020 1352 by Lb Arreaga LPN  Outcome: Ongoing  Note: Patient compliant with medication during shift.   Goal: Patient specific goal  Description: Patient specific goal  6/17/2020 2244 by Shruthi Landa RN  Outcome: Met This Shift  Note: Patient set a goal for today to clean herself up and tomake a game plan. 6/17/2020 1352 by Lb Arreaga LPN  Outcome: Ongoing  Note: Daily goal is to \"clean myself up\" and \"make a game plan\". Problem: Social interaction  Goal: Increased social interaction  6/17/2020 1547 by Colby Ivan  Outcome: Met This Shift     Problem: Discharge Planning:  Goal: Discharged to appropriate level of care  Description: Discharged to appropriate level of care  6/17/2020 2244 by Shruthi Landa RN  Outcome: Ongoing  Note: Discharge planning is in process. 6/17/2020 1352 by Lb Arreaga LPN  Outcome: Ongoing  Note: No discharge plans noted at this time during shift. Problem: Altered Mood, Psychotic Behavior:  Goal: Able to verbalize reality based thinking  Description: Able to verbalize reality based thinking  6/17/2020 2244 by Shruthi Landa RN  Outcome: Ongoing  Note: Patient is preoccupied with thinking that water makes you sleepy. When asked what kind of water she is talking about she states \" Any kind of water. \".  6/17/2020 1352 by Lb Arreaga LPN  Outcome: Ongoing  Note: Patient continues to work on verbalization of reality based thinking. Patient denies delusional thoughts during assessment. Problem: KNOWLEDGE DEFICIT,EDUCATION,DISCHARGE PLAN  Goal: Knowledge - personal safety  6/17/2020 1757 by Lb Arreaga LPN  Outcome: Completed  Note: 1. Warning signs that happen when I am distressed or experience harmful thoughts   Shake, speed up talking, can't sit still  2. Activities that I can do to cope in a healthy way when I am stressed or distressed   Exercise, listen to music  3. List of roadblocks that keep me from using healthy coping skills  My mind, other people  4.  List of my support persons  Myself     EMERGENCY CONTACTS:  -National suicide prevention life line 3-124-330-5751  -24 hour crisis line 1-049-HOPE (2590)  -Domestic violence hotline 2-791-155-SAFE (12 434 006)  -Test CONNECT to 746716 to chat with a trained crisis

## 2020-06-18 NOTE — PROGRESS NOTES
Group Therapy Note    Date: 6/17/2020  Start Time: 2000  End Time:  2020      Type of Group: Wrap-Up and relaxation        Patient's Goal:  To \" clean myself up\" and to \"make a game plan\"    Notes:  Progressing toward  Status After Intervention:  Improved    Participation Level:  Active Listener and Interactive    Participation Quality: Attentive and Sharing      Speech:  normal      Thought Process/Content: Linear      Affective Functioning: Congruent      Mood: anxious      Level of consciousness:  Alert      Response to Learning: Able to verbalize current knowledge/experience      Endings: None Reported    Modes of Intervention: Support and Socialization      Discipline Responsible: Registered Nurse      Signature:  Donte Melton RN

## 2020-06-19 VITALS
DIASTOLIC BLOOD PRESSURE: 54 MMHG | HEART RATE: 66 BPM | WEIGHT: 155 LBS | BODY MASS INDEX: 24.91 KG/M2 | RESPIRATION RATE: 16 BRPM | SYSTOLIC BLOOD PRESSURE: 116 MMHG | OXYGEN SATURATION: 99 % | HEIGHT: 66 IN | TEMPERATURE: 96.7 F

## 2020-06-19 PROCEDURE — 5130000000 HC BRIDGE APPOINTMENT

## 2020-06-19 PROCEDURE — 99239 HOSP IP/OBS DSCHRG MGMT >30: CPT | Performed by: PSYCHIATRY & NEUROLOGY

## 2020-06-19 PROCEDURE — 6370000000 HC RX 637 (ALT 250 FOR IP): Performed by: PHYSICIAN ASSISTANT

## 2020-06-19 PROCEDURE — 6370000000 HC RX 637 (ALT 250 FOR IP): Performed by: PSYCHIATRY & NEUROLOGY

## 2020-06-19 RX ORDER — DIVALPROEX SODIUM 500 MG/1
1000 TABLET, EXTENDED RELEASE ORAL NIGHTLY
Qty: 60 TABLET | Refills: 0 | Status: ON HOLD | OUTPATIENT
Start: 2020-06-19 | End: 2020-06-29 | Stop reason: HOSPADM

## 2020-06-19 RX ORDER — DIVALPROEX SODIUM 500 MG/1
500 TABLET, EXTENDED RELEASE ORAL DAILY
Qty: 30 TABLET | Refills: 0 | Status: SHIPPED | OUTPATIENT
Start: 2020-06-20

## 2020-06-19 RX ORDER — ARIPIPRAZOLE 15 MG/1
15 TABLET ORAL DAILY
Qty: 30 TABLET | Refills: 0 | Status: ON HOLD | OUTPATIENT
Start: 2020-06-20 | End: 2020-06-29 | Stop reason: HOSPADM

## 2020-06-19 RX ADMIN — DIVALPROEX SODIUM 500 MG: 500 TABLET, EXTENDED RELEASE ORAL at 08:20

## 2020-06-19 RX ADMIN — ACETAMINOPHEN 650 MG: 325 TABLET ORAL at 06:22

## 2020-06-19 RX ADMIN — ARIPIPRAZOLE 15 MG: 15 TABLET ORAL at 08:20

## 2020-06-19 ASSESSMENT — PAIN DESCRIPTION - ORIENTATION: ORIENTATION: LEFT

## 2020-06-19 ASSESSMENT — PAIN DESCRIPTION - ONSET: ONSET: ON-GOING

## 2020-06-19 ASSESSMENT — PAIN DESCRIPTION - PROGRESSION: CLINICAL_PROGRESSION: NOT CHANGED

## 2020-06-19 ASSESSMENT — PAIN DESCRIPTION - LOCATION: LOCATION: WRIST

## 2020-06-19 ASSESSMENT — PAIN SCALES - GENERAL
PAINLEVEL_OUTOF10: 1
PAINLEVEL_OUTOF10: 3

## 2020-06-19 ASSESSMENT — PAIN DESCRIPTION - DESCRIPTORS: DESCRIPTORS: DISCOMFORT

## 2020-06-19 ASSESSMENT — PAIN DESCRIPTION - PAIN TYPE: TYPE: CHRONIC PAIN

## 2020-06-19 ASSESSMENT — PAIN DESCRIPTION - FREQUENCY: FREQUENCY: INTERMITTENT

## 2020-06-19 ASSESSMENT — PAIN - FUNCTIONAL ASSESSMENT: PAIN_FUNCTIONAL_ASSESSMENT: ACTIVITIES ARE NOT PREVENTED

## 2020-06-22 ENCOUNTER — TELEPHONE (OUTPATIENT)
Dept: PSYCHIATRY | Age: 30
End: 2020-06-22

## 2020-06-24 ENCOUNTER — HOSPITAL ENCOUNTER (INPATIENT)
Age: 30
LOS: 4 days | Discharge: HOME | DRG: 750 | End: 2020-06-29
Attending: EMERGENCY MEDICINE | Admitting: PSYCHIATRY & NEUROLOGY
Payer: COMMERCIAL

## 2020-06-24 LAB
ACETAMINOPHEN LEVEL: < 5 UG/ML (ref 0–20)
ALBUMIN SERPL-MCNC: 4.1 G/DL (ref 3.5–5.1)
ALP BLD-CCNC: 83 U/L (ref 38–126)
ALT SERPL-CCNC: 11 U/L (ref 11–66)
ANION GAP SERPL CALCULATED.3IONS-SCNC: 11 MEQ/L (ref 8–16)
AST SERPL-CCNC: 14 U/L (ref 5–40)
BASOPHILS # BLD: 0.7 %
BASOPHILS ABSOLUTE: 0.1 THOU/MM3 (ref 0–0.1)
BILIRUB SERPL-MCNC: 0.2 MG/DL (ref 0.3–1.2)
BILIRUBIN DIRECT: < 0.2 MG/DL (ref 0–0.3)
BUN BLDV-MCNC: 20 MG/DL (ref 7–22)
CALCIUM SERPL-MCNC: 8.7 MG/DL (ref 8.5–10.5)
CHLORIDE BLD-SCNC: 109 MEQ/L (ref 98–111)
CO2: 21 MEQ/L (ref 23–33)
CREAT SERPL-MCNC: 0.9 MG/DL (ref 0.4–1.2)
EOSINOPHIL # BLD: 1.6 %
EOSINOPHILS ABSOLUTE: 0.2 THOU/MM3 (ref 0–0.4)
ERYTHROCYTE [DISTWIDTH] IN BLOOD BY AUTOMATED COUNT: 14.6 % (ref 11.5–14.5)
ERYTHROCYTE [DISTWIDTH] IN BLOOD BY AUTOMATED COUNT: 48.2 FL (ref 35–45)
ETHYL ALCOHOL, SERUM: < 0.01 %
GFR SERPL CREATININE-BSD FRML MDRD: 74 ML/MIN/1.73M2
GLUCOSE BLD-MCNC: 100 MG/DL (ref 70–108)
HCT VFR BLD CALC: 36.8 % (ref 37–47)
HEMOGLOBIN: 11.7 GM/DL (ref 12–16)
IMMATURE GRANS (ABS): 0.05 THOU/MM3 (ref 0–0.07)
IMMATURE GRANULOCYTES: 0.4 %
LIPASE: 26.1 U/L (ref 5.6–51.3)
LYMPHOCYTES # BLD: 23.9 %
LYMPHOCYTES ABSOLUTE: 3.3 THOU/MM3 (ref 1–4.8)
MAGNESIUM: 2.2 MG/DL (ref 1.6–2.4)
MCH RBC QN AUTO: 28.6 PG (ref 26–33)
MCHC RBC AUTO-ENTMCNC: 31.8 GM/DL (ref 32.2–35.5)
MCV RBC AUTO: 90 FL (ref 81–99)
MONOCYTES # BLD: 7 %
MONOCYTES ABSOLUTE: 1 THOU/MM3 (ref 0.4–1.3)
NUCLEATED RED BLOOD CELLS: 0 /100 WBC
OSMOLALITY CALCULATION: 284 MOSMOL/KG (ref 275–300)
PLATELET # BLD: 319 THOU/MM3 (ref 130–400)
PMV BLD AUTO: 8.6 FL (ref 9.4–12.4)
POTASSIUM SERPL-SCNC: 4.1 MEQ/L (ref 3.5–5.2)
PREGNANCY, SERUM: NEGATIVE
RBC # BLD: 4.09 MILL/MM3 (ref 4.2–5.4)
SALICYLATE, SERUM: 0.3 MG/DL (ref 2–10)
SEG NEUTROPHILS: 66.4 %
SEGMENTED NEUTROPHILS ABSOLUTE COUNT: 9.2 THOU/MM3 (ref 1.8–7.7)
SODIUM BLD-SCNC: 141 MEQ/L (ref 135–145)
TOTAL PROTEIN: 7.2 G/DL (ref 6.1–8)
TSH SERPL DL<=0.05 MIU/L-ACNC: 2.2 UIU/ML (ref 0.4–4.2)
WBC # BLD: 13.8 THOU/MM3 (ref 4.8–10.8)

## 2020-06-24 PROCEDURE — 82248 BILIRUBIN DIRECT: CPT

## 2020-06-24 PROCEDURE — 84443 ASSAY THYROID STIM HORMONE: CPT

## 2020-06-24 PROCEDURE — G0480 DRUG TEST DEF 1-7 CLASSES: HCPCS

## 2020-06-24 PROCEDURE — 36415 COLL VENOUS BLD VENIPUNCTURE: CPT

## 2020-06-24 PROCEDURE — 83690 ASSAY OF LIPASE: CPT

## 2020-06-24 PROCEDURE — 83735 ASSAY OF MAGNESIUM: CPT

## 2020-06-24 PROCEDURE — 99285 EMERGENCY DEPT VISIT HI MDM: CPT

## 2020-06-24 PROCEDURE — 80053 COMPREHEN METABOLIC PANEL: CPT

## 2020-06-24 PROCEDURE — 85025 COMPLETE CBC W/AUTO DIFF WBC: CPT

## 2020-06-24 PROCEDURE — 84703 CHORIONIC GONADOTROPIN ASSAY: CPT

## 2020-06-24 ASSESSMENT — ENCOUNTER SYMPTOMS
BACK PAIN: 0
DIARRHEA: 0
BLOOD IN STOOL: 0
TROUBLE SWALLOWING: 0
CHOKING: 0
EYE ITCHING: 0
EYE REDNESS: 0
VOMITING: 0
PHOTOPHOBIA: 0
ABDOMINAL DISTENTION: 0
EYE DISCHARGE: 0
SINUS PRESSURE: 0
SHORTNESS OF BREATH: 0
CONSTIPATION: 0
NAUSEA: 0
COUGH: 0
WHEEZING: 0
SORE THROAT: 0
ABDOMINAL PAIN: 0
EYE PAIN: 0
RHINORRHEA: 0
CHEST TIGHTNESS: 0
VOICE CHANGE: 0

## 2020-06-25 LAB
AMPHETAMINE+METHAMPHETAMINE URINE SCREEN: NEGATIVE
BACTERIA: ABNORMAL /HPF
BARBITURATE QUANTITATIVE URINE: NEGATIVE
BENZODIAZEPINE QUANTITATIVE URINE: NEGATIVE
BILIRUBIN URINE: NEGATIVE
BLOOD, URINE: ABNORMAL
CANNABINOID QUANTITATIVE URINE: NEGATIVE
CASTS 2: ABNORMAL /LPF
CASTS UA: ABNORMAL /LPF
CHARACTER, URINE: CLEAR
COCAINE METABOLITE QUANTITATIVE URINE: NEGATIVE
COLOR: YELLOW
CRYSTALS, UA: ABNORMAL
EPITHELIAL CELLS, UA: ABNORMAL /HPF
GLUCOSE URINE: NEGATIVE MG/DL
KETONES, URINE: 15
LEUKOCYTE ESTERASE, URINE: NEGATIVE
MISCELLANEOUS 2: ABNORMAL
MUCUS: ABNORMAL
NITRITE, URINE: NEGATIVE
OPIATES, URINE: NEGATIVE
OXYCODONE: NEGATIVE
PH UA: 5.5 (ref 5–9)
PHENCYCLIDINE QUANTITATIVE URINE: NEGATIVE
PROTEIN UA: ABNORMAL
RBC URINE: ABNORMAL /HPF
RENAL EPITHELIAL, UA: ABNORMAL
SPECIFIC GRAVITY, URINE: > 1.03 (ref 1–1.03)
UROBILINOGEN, URINE: 1 EU/DL (ref 0–1)
WBC UA: ABNORMAL /HPF
YEAST: ABNORMAL

## 2020-06-25 PROCEDURE — APPSS30 APP SPLIT SHARED TIME 16-30 MINUTES: Performed by: PHYSICIAN ASSISTANT

## 2020-06-25 PROCEDURE — 99223 1ST HOSP IP/OBS HIGH 75: CPT | Performed by: PSYCHIATRY & NEUROLOGY

## 2020-06-25 PROCEDURE — 6370000000 HC RX 637 (ALT 250 FOR IP): Performed by: PHYSICIAN ASSISTANT

## 2020-06-25 PROCEDURE — 1240000000 HC EMOTIONAL WELLNESS R&B

## 2020-06-25 PROCEDURE — 81001 URINALYSIS AUTO W/SCOPE: CPT

## 2020-06-25 PROCEDURE — 6370000000 HC RX 637 (ALT 250 FOR IP): Performed by: PSYCHIATRY & NEUROLOGY

## 2020-06-25 PROCEDURE — 90833 PSYTX W PT W E/M 30 MIN: CPT | Performed by: PSYCHIATRY & NEUROLOGY

## 2020-06-25 PROCEDURE — 80307 DRUG TEST PRSMV CHEM ANLYZR: CPT

## 2020-06-25 RX ORDER — DIVALPROEX SODIUM 500 MG/1
500 TABLET, EXTENDED RELEASE ORAL DAILY
Status: DISCONTINUED | OUTPATIENT
Start: 2020-06-25 | End: 2020-06-29 | Stop reason: HOSPADM

## 2020-06-25 RX ORDER — HYDROXYZINE HYDROCHLORIDE 25 MG/1
50 TABLET, FILM COATED ORAL 3 TIMES DAILY PRN
Status: DISCONTINUED | OUTPATIENT
Start: 2020-06-25 | End: 2020-06-29 | Stop reason: HOSPADM

## 2020-06-25 RX ORDER — IBUPROFEN 200 MG
400 TABLET ORAL EVERY 6 HOURS PRN
Status: DISCONTINUED | OUTPATIENT
Start: 2020-06-25 | End: 2020-06-29 | Stop reason: HOSPADM

## 2020-06-25 RX ORDER — MAGNESIUM HYDROXIDE/ALUMINUM HYDROXICE/SIMETHICONE 120; 1200; 1200 MG/30ML; MG/30ML; MG/30ML
30 SUSPENSION ORAL EVERY 6 HOURS PRN
Status: DISCONTINUED | OUTPATIENT
Start: 2020-06-25 | End: 2020-06-29 | Stop reason: HOSPADM

## 2020-06-25 RX ORDER — POLYETHYLENE GLYCOL 3350 17 G
2 POWDER IN PACKET (EA) ORAL PRN
Status: DISCONTINUED | OUTPATIENT
Start: 2020-06-25 | End: 2020-06-29 | Stop reason: HOSPADM

## 2020-06-25 RX ORDER — DIVALPROEX SODIUM 500 MG/1
1000 TABLET, EXTENDED RELEASE ORAL NIGHTLY
Status: DISCONTINUED | OUTPATIENT
Start: 2020-06-25 | End: 2020-06-28

## 2020-06-25 RX ORDER — ARIPIPRAZOLE 15 MG/1
15 TABLET ORAL DAILY
Status: DISCONTINUED | OUTPATIENT
Start: 2020-06-25 | End: 2020-06-26

## 2020-06-25 RX ORDER — ACETAMINOPHEN 325 MG/1
650 TABLET ORAL EVERY 4 HOURS PRN
Status: DISCONTINUED | OUTPATIENT
Start: 2020-06-25 | End: 2020-06-29 | Stop reason: HOSPADM

## 2020-06-25 RX ORDER — NICOTINE 21 MG/24HR
1 PATCH, TRANSDERMAL 24 HOURS TRANSDERMAL DAILY
Status: DISCONTINUED | OUTPATIENT
Start: 2020-06-25 | End: 2020-06-25

## 2020-06-25 RX ORDER — TRAZODONE HYDROCHLORIDE 50 MG/1
50 TABLET ORAL NIGHTLY PRN
Status: DISCONTINUED | OUTPATIENT
Start: 2020-06-25 | End: 2020-06-28

## 2020-06-25 RX ORDER — TRAZODONE HYDROCHLORIDE 50 MG/1
50 TABLET ORAL ONCE
Status: DISCONTINUED | OUTPATIENT
Start: 2020-06-25 | End: 2020-06-29 | Stop reason: HOSPADM

## 2020-06-25 RX ADMIN — ARIPIPRAZOLE 15 MG: 15 TABLET ORAL at 13:18

## 2020-06-25 RX ADMIN — DIVALPROEX SODIUM 500 MG: 500 TABLET, EXTENDED RELEASE ORAL at 13:18

## 2020-06-25 RX ADMIN — HYDROXYZINE HYDROCHLORIDE 50 MG: 25 TABLET ORAL at 01:21

## 2020-06-25 RX ADMIN — IBUPROFEN 400 MG: 200 TABLET, FILM COATED ORAL at 20:10

## 2020-06-25 RX ADMIN — DIVALPROEX SODIUM 1000 MG: 500 TABLET, EXTENDED RELEASE ORAL at 20:45

## 2020-06-25 ASSESSMENT — SLEEP AND FATIGUE QUESTIONNAIRES
DIFFICULTY STAYING ASLEEP: YES
SLEEP PATTERN: DIFFICULTY FALLING ASLEEP;DISTURBED/INTERRUPTED SLEEP;RESTLESSNESS
DIFFICULTY FALLING ASLEEP: YES
DIFFICULTY ARISING: NO
DO YOU HAVE DIFFICULTY SLEEPING: YES
RESTFUL SLEEP: NO
AVERAGE NUMBER OF SLEEP HOURS: 5
DO YOU USE A SLEEP AID: YES

## 2020-06-25 ASSESSMENT — PAIN DESCRIPTION - PROGRESSION
CLINICAL_PROGRESSION: NOT CHANGED
CLINICAL_PROGRESSION: GRADUALLY IMPROVING

## 2020-06-25 ASSESSMENT — PAIN DESCRIPTION - LOCATION: LOCATION: WRIST

## 2020-06-25 ASSESSMENT — LIFESTYLE VARIABLES: HISTORY_ALCOHOL_USE: NO

## 2020-06-25 ASSESSMENT — PAIN SCALES - GENERAL
PAINLEVEL_OUTOF10: 0
PAINLEVEL_OUTOF10: 0
PAINLEVEL_OUTOF10: 4
PAINLEVEL_OUTOF10: 0

## 2020-06-25 ASSESSMENT — PAIN DESCRIPTION - FREQUENCY: FREQUENCY: INTERMITTENT

## 2020-06-25 ASSESSMENT — PAIN DESCRIPTION - PAIN TYPE: TYPE: CHRONIC PAIN

## 2020-06-25 ASSESSMENT — PAIN DESCRIPTION - ORIENTATION: ORIENTATION: LEFT

## 2020-06-25 ASSESSMENT — PATIENT HEALTH QUESTIONNAIRE - PHQ9: SUM OF ALL RESPONSES TO PHQ QUESTIONS 1-9: 6

## 2020-06-25 ASSESSMENT — PAIN - FUNCTIONAL ASSESSMENT: PAIN_FUNCTIONAL_ASSESSMENT: ACTIVITIES ARE NOT PREVENTED

## 2020-06-25 ASSESSMENT — PAIN DESCRIPTION - ONSET: ONSET: ON-GOING

## 2020-06-25 ASSESSMENT — PAIN DESCRIPTION - DESCRIPTORS: DESCRIPTORS: ACHING

## 2020-06-25 NOTE — PLAN OF CARE
Problem: Discharge Planning:  Goal: Discharged to appropriate level of care  Description: Discharged to appropriate level of care  Outcome: Ongoing  Note: Patient voices no needs before discharge. [place]. Discharge planner working with patient to achieve optimal discharge plans, specific to individual needs. Problem: Altered Mood, Manic Behavior:  Goal: Able to sleep  Description: Able to sleep  Outcome: Ongoing  Note: Patient slept 3.5 hours last night, reports she slept well. Encourage patient not to take naps during the day, relax several hours before bed and to take prescribed sleep meds as ordered. Patient verbalized understanding. Goal: Able to verbalize decrease in frequency and intensity of racing thoughts  Description: Able to verbalize decrease in frequency and intensity of racing thoughts  Outcome: Ongoing  Note: Patient reports racing thoughts this am.  Goal: Ability to disclose and discuss suicidal ideas will improve  Description: Ability to disclose and discuss suicidal ideas will improve  Outcome: Ongoing  Note: Patient denies suicidal ideations, no plan or intent to harm self. Patient remains on suicidal precautions 15 checks for safety. Instructed to seek staff as needed for thoughts of self harm. Goal: Absence of self-harm  Description: Absence of self-harm  Outcome: Ongoing  Note: No self harm behaviors were observed or reported so far this shift. Remains on every 15 minutes precautions for safety. Goal: Ability to achieve adequate nutritional intake will improve  Description: Ability to achieve adequate nutritional intake will improve  Outcome: Ongoing  Note: Patient eating 100% of meals today. Encourage patient to drink supplements for increase calorie intake.     Goal: Ability to interact with others will improve  Description: Ability to interact with others will improve  Outcome: Ongoing  Note: Patient interacting with

## 2020-06-25 NOTE — ED NOTES
ED to inpatient nurses report    Chief Complaint   Patient presents with    Psychiatric Evaluation      Present to ED from [facility] PSYCHIATRIC  LOC: Pt is rambling with her words and does not make sense. Pt is cooperative. Vital signs   Vitals:    06/24/20 2139   BP: 134/81   Pulse: 73   Resp: 18   Temp: 98.1 °F (36.7 °C)   TempSrc: Oral   SpO2: 98%   Weight: 155 lb (70.3 kg)   Height: 5' 5\" (1.651 m)      Oxygen Baseline 98% on room air    Current needs required none at this time  LDAs:    Mobility: Independent  Pending ED orders: N/A  Present condition: vital signs are stable, pt is cooperative at this time.      Electronically signed by Lucia Wade RN on 6/25/2020 at 12:44 AM     Lucia Wade RN  06/25/20 4846

## 2020-06-25 NOTE — ED NOTES
Bed: 025A  Expected date:   Expected time:   Means of arrival: [facility]  Comments:     Emmanuel Block RN  06/24/20 9022

## 2020-06-25 NOTE — PATIENT CARE CONFERENCE
[facility]    Review Date & Time: 6/25/20 1315    Patient was in treatment team.  See Multidisciplinary Treatment Team sheet for participants. Admission Type:   Admission Type: Voluntary    Reason for admission:  Reason for Admission: Unsure      Estimated Length of Stay Update:  3-5 days  Estimated Discharge Date Update: 3-5 days    PATIENT STRENGTHS:  Patient Strengths Strengths: Connection to output provider  Patient Strengths and Limitations:Limitations: Difficult relationships / poor social skills  Addictive Behavior:Addictive Behavior  In the past 3 months, have you felt or has someone told you that you have a problem with:  : None  Do you have a history of Chemical Use?: No  Do you have a history of Alcohol Use?: No  Do you have a history of Street Drug Abuse?: No  Histroy of Prescripton Drug Abuse?: No  Medical Problems:  Past Medical History:   Diagnosis Date    [facility] [address]     Cerebral palsy ([facility] [address]     Chicken pox        EDUCATION:   Learner Progress Toward Treatment Goals: Reviewed results and recommendations of this team, Reviewed group plan and strategies, Reviewed signs, symptoms and risk of self harm and violent behavior and Reviewed goals and plan of care    Method: Individual    Outcome: Verbalized understanding and Needs reinforcement    PATIENT GOALS: Medication management, improve sleep, try to relax    PLAN/TREATMENT RECOMMENDATIONS UPDATE:   1. What is the most important thing we can help you with while you are here? - Medication management, improve sleep, try to relax  2. Who is your support system? - Limited  3. Do you have follow-up providers? Latricia Weaver CNP  4. Do you have the ability to pay for your medications? - Yes   5. Where will you be residing when you leave the hospital?  - [facility]  6.  Will need a return to work slip or FMLA paper completion?  - None      GOALS UPDATE:   Time frame for

## 2020-06-25 NOTE — ED PROVIDER NOTES
[facility]  eMERGENCY dEPARTMENT eNCOUnter          CHIEF COMPLAINT       Chief Complaint   Patient presents with   [facility]       Nurses Notes reviewed and I agree except as noted in the HPI. HISTORY OF PRESENT ILLNESS    Flora Xie is a 34 y.o. female who presents for what I think is a manic episode. Apparently the patient is at [facility], she was getting more manic there so they decided sent her in for evaluation. The patient does have bipolar 1 disorder and schizophrenia. I went and spoke with her. She is denying any suicidal homicidal ideation. She denies any drugs or alcohol. She states over and over and over that she wants to sleep. She states that she is upset because the psychiatrist over there is constantly changing her medications and it feels like she is being given \"liquor\" and it is not helping her it is making her worse. Patient denies any physical complaints at this time. Patient is resting comfortably on the cot in no apparent distress although very manic in behavior. REVIEW OF SYSTEMS     Review of Systems   Constitutional: Negative for activity change, appetite change, diaphoresis, fatigue and unexpected weight change. HENT: Negative for congestion, ear discharge, ear pain, hearing loss, rhinorrhea, sinus pressure, sore throat, trouble swallowing and voice change. Eyes: Negative for photophobia, pain, discharge, redness and itching. Respiratory: Negative for cough, choking, chest tightness, shortness of breath and wheezing. Cardiovascular: Negative for chest pain, palpitations and leg swelling. Gastrointestinal: Negative for abdominal distention, abdominal pain, blood in stool, constipation, diarrhea, nausea and vomiting. Endocrine: Negative for polydipsia, polyphagia and polyuria. Genitourinary: Negative for decreased urine volume, difficulty urinating, dysuria, enuresis, frequency, hematuria and urgency.    Musculoskeletal: Negative for arthralgias, back pain, gait problem, myalgias, neck pain and neck stiffness. Skin: Negative for pallor and rash. Allergic/Immunologic: Negative for immunocompromised state. Neurological: Negative for dizziness, tremors, seizures, syncope, facial asymmetry, weakness, light-headedness, numbness and headaches. Hematological: Negative for adenopathy. Does not bruise/bleed easily. Psychiatric/Behavioral: Positive for agitation and behavioral problems. Negative for hallucinations, self-injury, sleep disturbance and suicidal ideas. The patient is not nervous/anxious. PAST MEDICAL HISTORY    has a past medical history of Bipolar affective ([facility] [address] Cerebral palsy ([facility] [address] and Chicken pox. SURGICAL HISTORY      has a past surgical history that includes Arm Surgery. CURRENT MEDICATIONS       Previous Medications    ARIPIPRAZOLE (ABILIFY) 15 MG TABLET    Take 1 tablet by mouth daily    ARIPIPRAZOLE LAUROXIL (ARISTADA) 882 MG/3.2ML PRSY INJECTION    Inject 882 mg into the muscle every 28 days Last 2020    DIVALPROEX (DEPAKOTE ER) 500 MG EXTENDED RELEASE TABLET    Take 1 tablet by mouth daily    DIVALPROEX (DEPAKOTE ER) 500 MG EXTENDED RELEASE TABLET    Take 2 tablets by mouth nightly    MELATONIN 5 MG TABS TABLET    Take 5 mg by mouth nightly as needed 1-2  Hs prn       ALLERGIES     is allergic to invega [paliperidone er]. FAMILY HISTORY     She indicated that her mother is alive. She indicated that her father is alive. She indicated that her paternal grandmother is . She indicated that her paternal grandfather is . family history includes Bipolar Disorder in her mother. SOCIAL HISTORY      reports that she has been smoking cigarettes. She has a 7.50 pack-year smoking history. She has never used smokeless tobacco. She reports that she does not drink alcohol or use drugs. PHYSICAL EXAM     INITIAL VITALS:  height is 5' 5\" (1.651 m) and weight is 155 lb (70.3 kg). Her oral temperature is 98.1 °F (36.7 °C). Her blood pressure is 134/81 and her pulse is 73. Her respiration is 18 and oxygen saturation is 98%. Physical Exam  Vitals signs and nursing note reviewed. Constitutional:       General: She is not in acute distress. Appearance: She is well-developed. She is not diaphoretic. HENT:      Head: Normocephalic and atraumatic. Right Ear: External ear normal.      Left Ear: External ear normal.      Nose: Nose normal.      Mouth/Throat:      Pharynx: No oropharyngeal exudate. Eyes:      General: No scleral icterus. Right eye: No discharge. Left eye: No discharge. Conjunctiva/sclera: Conjunctivae normal.      Pupils: Pupils are equal, round, and reactive to light. Neck:      Musculoskeletal: Normal range of motion and neck supple. Thyroid: No thyromegaly. Vascular: No JVD. Trachea: No tracheal deviation. Cardiovascular:      Rate and Rhythm: Normal rate and regular rhythm. Heart sounds: Normal heart sounds, S1 normal and S2 normal. No murmur. No friction rub. No gallop. Pulmonary:      Effort: Pulmonary effort is normal.      Breath sounds: Normal breath sounds. No stridor. No wheezing, rhonchi or rales. Chest:      Chest wall: No tenderness. Abdominal:      General: Bowel sounds are normal. There is no distension. Palpations: Abdomen is soft. There is no mass. Tenderness: There is no abdominal tenderness. There is no guarding or rebound. Hernia: No hernia is present. Musculoskeletal: Normal range of motion. General: No tenderness. Lymphadenopathy:      Cervical: No cervical adenopathy. Skin:     General: Skin is warm and dry. Findings: No bruising, ecchymosis, lesion or rash. Neurological:      Mental Status: She is alert and oriented to person, place, and time. Cranial Nerves: No cranial nerve deficit.       Coordination: Coordination normal.      Deep Tendon Reflexes: Reflexes are normal and symmetric. Psychiatric:         Attention and Perception: Attention normal.         Mood and Affect: Mood is anxious. Affect is labile. Speech: Speech is rapid and pressured. Behavior: Behavior is agitated. Thought Content: Thought content is paranoid. Thought content does not include homicidal or suicidal ideation. Thought content does not include homicidal or suicidal plan. Cognition and Memory: Cognition is impaired. Judgment: Judgment normal.           DIFFERENTIAL DIAGNOSIS:   Differential diagnosis discussed extensively at bedside with the patient including but not limited to paranoia schizophrenia nicole    DIAGNOSTIC RESULTS     EKG: All EKG's are interpreted by the Emergency Department Physician who either signs or Co-signs this chart in the absence of a cardiologist.  None    RADIOLOGY: non-plain film images(s) such as CT, Ultrasound and MRI are read by the radiologist.  None    LABS:   Labs Reviewed   CBC WITH AUTO DIFFERENTIAL - Abnormal; Notable for the following components:       Result Value    WBC 13.8 (*)     RBC 4.09 (*)     Hemoglobin 11.7 (*)     Hematocrit 36.8 (*)     MCHC 31.8 (*)     RDW-CV 14.6 (*)     RDW-SD 48.2 (*)     MPV 8.6 (*)     Segs Absolute 9.2 (*)     All other components within normal limits   BASIC METABOLIC PANEL - Abnormal; Notable for the following components:    CO2 21 (*)     All other components within normal limits   HEPATIC FUNCTION PANEL - Abnormal; Notable for the following components:     Total Bilirubin 0.2 (*)     All other components within normal limits   SALICYLATE LEVEL - Abnormal; Notable for the following components:    Salicylate, Serum 0.3 (*)     All other components within normal limits   GLOMERULAR FILTRATION RATE, ESTIMATED - Abnormal; Notable for the following components:    Est, Glom Filt Rate 74 (*)     All other components within normal limits   LIPASE   MAGNESIUM   HCG, SERUM, QUALITATIVE   TSH WITHOUT REFLEX   ACETAMINOPHEN LEVEL   ETHANOL   ANION GAP   OSMOLALITY   URINE DRUG SCREEN   URINE RT REFLEX TO CULTURE       EMERGENCY DEPARTMENT COURSE:   Vitals:    Vitals:    06/24/20 2139   BP: 134/81   Pulse: 73   Resp: 18   Temp: 98.1 °F (36.7 °C)   TempSrc: Oral   SpO2: 98%   Weight: 155 lb (70.3 kg)   Height: 5' 5\" (1.651 m)     The patient was assessed at bedside appropriate labs and imaging were ordered. LORIE was consulted. I reviewed all labs all which were within normal limits. Behavioral health went in and assessed the patient. Patient was unable to give us urine. At this point they called psychiatry. They discussed case with them. Psychiatry felt that the patient would benefit from admission. Patient is admitted in stable condition pending further evaluation and treatment. CRITICAL CARE:   None    CONSULTS:  Behavioral health    PROCEDURES:  None    FINAL IMPRESSION      1.  Bipolar 1 disorder ([facility] [address]    2. Schizoaffective disorder, bipolar type [facility])          DISPOSITION/PLAN   Admission    PATIENT REFERRED TO:  Dorrene Contras, APRN - CNP  [address]            DISCHARGE MEDICATIONS:  New Prescriptions    No medications on file       (Please note that portions of this note were completed with a voice recognition program.  Efforts were made to edit the dictations but occasionally words are mis-transcribed.)    Cj Farrar, [address] Lennart Schirmer, DO  06/25/20 9239

## 2020-06-25 NOTE — GROUP NOTE
Group Therapy Note    Date: 6/25/2020    Group Start Time: 1430  Group End Time: 1500  Group Topic: Healthy Living/Wellness    [facility] 4E    Sheryl Erickson LPN        Group Therapy Note    Attendees: 7         Notes:  Did not attend    Discipline Responsible: Licensed Practical Nurse      Signature:  Sheryl Erickson LPN

## 2020-06-25 NOTE — ED NOTES
Patient placed in safe room that is ligature resistant with continuous monitoring in place. Provider notified, requested an assessment by behavioral health . Patient belongings secured in a locked lockers outside of the room. Explained suicide prevention precautions to the patient including constant observer.         Faythe Frankel, [facility]  86/54/48 9986

## 2020-06-25 NOTE — PROGRESS NOTES
Provisional Diagnosis:   Schizoaffective Disorder Bipolar Type. Risk, Psychosocial and Contextual Factors:  Limited support system. Current  Treatment: [facility]      Present Suicidal Behavior:      Verbal: Denies         Attempt:  Denies    Access to Weapons:  Denies    Current Suicide Risk: Low, Moderate or High:   Low     Past Suicidal Behavior:       Verbal: xxx    Attempt: ADRIAN    Self-Injurious/Self-Mutilation: Denies    Traumatic Event Within Past 2 Weeks:   Denies    Current Abuse: Denies    Legal: Denies    Violence: Denies    Protective Factors:  [facility],     Housing:    [facility]    CPAP/Oxygen/Ambulation Difficulties:  ADRIAN    Basic Vital Signs Normal?: Check with Patients Nurse prior to [address]?: Check with Patients Nurse prior to Calling Psychiatry    Clinical Summary:      Patient is a [de-identified] year old female transported from [place]. Clinician placed call to CSU for information concerning transport. Per Magali Castaneda patient has increase in behaviors. Patient was walking back to her housing at [facility] holding hands with two gentlemen. When staff approached patient bolted from staff. Patient stated to staff at [address] [facility] that she made money from  [employer] herself. Luis Miguel Wade had requested a drug screen. Patient returned the cup with urine spilled over with water. Patients speech is rapid, pressured  with tangential thought. Patient responds to questions unrelated to assessment. Patient needs continual redirection however responses are inappriopriate . When asking patient if she has taken her medication the past several days she responds with 'Viagra, thats what is in shots'   23:00 Patient is awake, cooperative, monitored by [facility]  00:00 Patient is awake, cooperative. Monitored by [facility]    Level of Care Disposition:      Consulted with Dr. Abdoul Multani concerning the mental status of patient.  Consulted with Dr. Meza Cancer concerning the mental status of patient. Patient meets criteria for inpatient care. Spoke with patient concerning recommendations. Patient is a voluntary admit to 4E. Report given to CHRISTEN Martin  on 4E.

## 2020-06-25 NOTE — ED NOTES
Pt presents to the ed with LACP from [facility]. PT is voluntary at this time. Patient denies suicidal ideation, homicidal ideation and pain at this time. PT has very rambled speech at this time. When asked if she is eating and drinking ok, the patient stated \"well they are locking my food up so I can't eat, this is why I look like this\" When asked what the patient was doing when she went to [facility] she stated \" I went there to get my medication, they changed it last night and I can't take trazadone because I didn't sleep for 9 months\" \"my psychiatrist is 6years old\" \"I was going to the [employer] to get a 20 dollar bill and go to the social security office and they changed those places to a hospital and now i'm here\" \" I dont know why im here, Im fine really\".       Sharmin Speaks, [place]  28/88/88 9431

## 2020-06-25 NOTE — PROGRESS NOTES
BHI Biopsychosocial Assessment    Current Level of Psychosocial Functioning     Independent XXX  Dependent    Minimal Assist     Comments:      Psychosocial High Risk Factors (check all that apply)    Unable to obtain meds   Chronic illness/pain    Substance abuse   Lack of Family Support   Financial stress   Isolation   Inadequate Community Resources  Suicide attempt(s)  Not taking medications XXX  Victim of crime   Developmental Delay  Unable to manage personal needs  XXX  Age 72 or older   Homeless  No transportation   Readmission within 30 days XXX  Unemployment  High Risk Sexual Behavior XXX  Traumatic Event    Psychiatric Advanced Directive: None    Family to involve in treatment: None    Sexual Orientation:  Heterosexual     Patient Strengths: Outpatient Provider, Housing    Patient Barriers: Non-Compliance, High Risk Sexual Behavior, Impulsive, Poor Insight/Judgement    Opiate education provided: Not Indicated    Safety plan: On-Going    Plan of Care: Medication management, group/individual therapies, family meetings, psycho -education, treatment team meetings to assist with stabilization    Initial Discharge Plan: Patient is currently residing at the [facility] here in [facility]. Patient plans to return there upon discharge. Clinical Summary: This is a 34year old, female who presented to [facility] emergency department on [facility] from [place] [address]. It is reported by ED staff that patient has been displaying increased inappropriate sexual behaviors. 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 Patient was disorganized, rapid/pressured speech. Patient reports that she has not been taking her medication. Upon admission to , patient reports that her physician changed her medication, states \"he tried to put in a shot from and I don't need that. \" Patient reports that those shots slow her down, she reports she has so much energy she could run through a wall.  Patient reports that she does

## 2020-06-25 NOTE — ED NOTES
Urine sample sent. Pt laying on cot and respirations easy and unlabored. Pt denies any further needs at this time.       Kalina Ascencio RN  06/25/20 8458

## 2020-06-25 NOTE — ED NOTES
Pt reminded that we need a urine sample and pt states \" I just need two more cups of water and then I can go\". Pt was given 2 cups of water. Pt respirations easy and unlabored. Will try again at a later time.       Julianna Morrell RN  06/25/20 6990

## 2020-06-25 NOTE — ED NOTES
PT attempting to go to the bathroom at this time, provided patient water. Pt denies any further needs at this time,will continue to monitor.       Varsha Ava, [place]  67/67/72 0266

## 2020-06-25 NOTE — PROGRESS NOTES
Group Therapy Note    Date: 6/25/2020  Start Time: 1330  End Time: 1430  Number of Participants: 8    Type of Group: Psychotherapy        Notes:  Pt is present for group. Peers discussed issues related to inaccurate stigma's and how they contribute to personal fear of seeking help. Processed how those beliefs are internalized even when others do not subscribe to the stereo type. Discussed the emotional recovery process of being well and the role in which unmet needs contribute to emotional distress. Status After Intervention:  Unchnaged    Participation Level:  Active Listener and Interactive    Participation Quality: Appropriate, Attentive, Sharing and Supportive      Speech:  normal      Thought Process/Content: Linear but disorganized      Affective Functioning: Congruent      Mood: dysphoric      Level of consciousness:  Alert, Oriented x4 and Attentive      Response to Learning: Able to verbalize current knowledge/experience, Able to verbalize/acknowledge new learning, Able to retain information, Capable of insight, Able to change behavior and Progressing to goal      Endings: None Reported    Modes of Intervention: Education, Support, Socialization, Exploration, Clarifying and Problem-solving      Discipline Responsible: /Counselor      Signature:  REE Carr

## 2020-06-25 NOTE — PLAN OF CARE
Patient has attended at least 2 groups and has also been out of her room for socialization so she has met her socialization goal for today.

## 2020-06-25 NOTE — H&P
Department of Psychiatry  Psychiatric Assessment     CHIEF COMPLAINT:  Psychosis    HISTORY OF PRESENT ILLNESS:      Gregoria Galan is a 34 y.o. female with a history of schizoaffective disorder, bipolar type who presented to the emergency department with [facility] for psychosis. Per Maryellen Minder from [facility], patient has had an increase in behaviors. Patient was walking back to her housing at "[employer] holding hands with two gentlemen. When staff approached patient bolted from staff. Patient stated to staff at [facility] that she made money from  [employer] herself. [employer] had requested a drug screen. Patient returned the cup with urine spilled over with water. Per the ED, patients speech is rapid, pressured with tangential thought. Patient responds to questions unrelated to assessment. Patient needs continual redirection however responses are inappriopriate . When asking patient if she has taken her medication the past several days she responds with 'Viagra, thats what is in shots' When asked if she is eating and drinking ok, the patient stated \"well they are locking my food up so I can't eat, this is why I look like this\" When asked what the patient was doing when she went to [facility] she stated \" I went there to get my medication, they changed it last night and I can't take trazadone because I didn't sleep for 9 months\" \"my psychiatrist is 6years old\" \"I was going to the bank to get a 20 dollar bill and go to the social security office and they changed those places to a hospital and now i'm here\" \" I dont know why im here, Im fine really\". Trino Claudio is a poor historian. She is not oriented to situation. She states she was admitted because \"yeah they changed my medication again. My psychiatrist tried to give me 3 different shots I didn't need. He gave me 2 at first in the muscle at [employer]. The shots make me restless.  They make me want to fight someone, punch something, punch the door. Its taking hours to fall asleep with those shots. \" She is not sure what is in the shots. \"After I used the bathroom it is like straight Fireball whiskey. \" She reports following discharge from , Mami Rossi changed her medications. Her speech is rapid, pressured and interrupting at times. She appears restless on examination. She exhibits flight of ideas, loose associations and tangentiality. She also laughs inappropriately at times. She states \"I have so much energy I could run through the walls. Could probably do anything I wanted to. \" She states she has been feeling very irritable. She feels her mind is racing a mile a minute like a hamster wheel. She states she has not been sleeping well at home prior to admission. She denies recent hallucinations. She currently sees Mami Rossi at [facility]. She is diagnosed with schizoaffective disorder, bipolar type. She currently takes Aristada BAZZI, Abilify PO, and Depakote. . Reports good medication compliance. Denies side effects. Denies past suicide attempts. Was admitted in 2006 to [facility] inpatient youth psychiatric unit. Denies other previous admissions. Denies substance abuse. UDS negative. PSYCHIATRIC HISTORY:      · Outpatient psychiatric provider:  Mami Rossi at [facility]  · Suicide attempts: Denies  · Inpatient psychiatric admissions: Was admitted to  from 06/16/2020-06/29/2020 for psychosis. Was admitted to [facility] youth psychiatric unit in 2006.      Past psychiatric medications includes:     Risperdal, Geodon, Depakote, Abilify (PO and BAZZI)  Adverse reactions from psychotropic medications:    Denies      Past Medical History:        Diagnosis Date    Bipolar affective ([address]     Cerebral palsy ([address]     Chicken pox        Past Surgical History:        Procedure Laterality Date    ARM SURGERY         Medications Prior to Admission:   Medications Prior to Admission: divalproex (DEPAKOTE ER) 500 MG extended release tablet, Take 1 tablet by mouth daily  divalproex (DEPAKOTE ER) 500 MG extended release tablet, Take 2 tablets by mouth nightly  ARIPiprazole (ABILIFY) 15 MG tablet, Take 1 tablet by mouth daily  melatonin 5 MG TABS tablet, Take 5 mg by mouth nightly as needed 1-2  Hs prn  ARIPiprazole lauroxil (ARISTADA) 882 MG/3.2ML PRSY injection, Inject 882 mg into the muscle every 28 days Last 5/12/2020    Allergies:  Invega [paliperidone er]    Social History:     · RESIDENCE: Currently lives at the [facility] in [facility]. Previously lived in [place]. · LEVEL OF EDUCATION: Graduated HS  · MARITAL STATUS: Single. Not currently in a relationship. · CHILDREN: 1 son who was a closed adoption. · OCCUPATION: Currently unemployed. Lost her job a year ago  · SUBSTANCE ABUSE: Denies  · PATIENT ASSETS: connection to services      Family Psychiatric and Medical History:         Problem Relation Age of Onset    Bipolar Disorder Mother        Psychiatric Review of Systems      ·    Obsessions and Compulsions: denies  ·    Marilin or Hypomania: denies  ·    Hallucinations: denies  ·    Panic Attacks:  denies   ·    Delusions:  denies  ·    Phobias: denies    Medical Review of Systems:  Verbally reviewed with patient in presence of nursing staff. Constitutional: Negative for chills and weight loss. HENT: Negative for ear pain and nosebleeds. Eyes: Negative for blurred vision and photophobia. Respiratory: Negative for cough, shortness of breath and wheezing. Cardiovascular: Negative for chest pain and palpitations. Gastrointestinal: Negative for abdominal pain, diarrhea and vomiting. Genitourinary: Negative for dysuria and urgency. Musculoskeletal: Negative for falls and joint pain. Skin: Negative for itching and rash. Neurological: Negative for tremors, seizures and weakness. Endo/Heme/Allergies: Does not bruise/bleed easily.    All other systems reviewed and are negative. PHYSICAL EXAM:   Vitals:  BP (!) 110/58   Pulse 59   Temp 97 °F (36.1 °C) (Oral)   Resp 16   Ht 5' 5\" (1.651 m)   Wt 155 lb (70.3 kg)   SpO2 97%   BMI 25.79 kg/m²     Constitutional:  Appears well-developed and well-nourished, no acute distress  HENT:   Head: Normocephalic and atraumatic. Eyes: Right eye exhibits no discharge. Left eye exhibits no discharge. Neck: Normal range of motion. Pulmonary/Chest:  No respiratory distress or accessory muscle use. Abdominal: Normal to examination  Musculoskeletal: Normal range of motion observed. Neurological: cranial nerves II-XII grossly in tact, normal gait and station. Cranial nerve examination done with assistance from nursing staff. Skin: Is not diaphoretic. Mental Status Examination:    Level of consciousness:   Moderate dysattention (reduced clarity of awareness with impaired ability to focus, sustain, or shift attention)  Appearance:  well-appearing, hospital attire and in chair, disheveled  Behavior/Motor:  hyperactive  Attitude toward examiner:  Cooperative and poor eye contact  Speech:  rapid, pressured and interrupting  Mood:  Elevated  Affect:  labile  Thought processes:  rapid, flight of ideas, loose associations and tangential  Thought content:  Denies homicidal ideation  Suicidal Ideation:  denies suicidal ideation  Delusions:  No evidence of delusions on examination  Perceptual Disturbance:  denies any perceptual disturbance  Cognition: Patient is oriented to person, place, time   Concentration: poor  Memory: impaired recent memory  Insight & Judgement: limited         DSM-5 DIAGNOSIS:    Schizoaffective disorder, bipolar type    Patient Active Problem List   Diagnosis    Bipolar 1 disorder ([facility]    Other schizophrenia ([facility]    Flexural eczema    Toenail fungus    Schizoaffective disorder, bipolar type ([facility]          Psychosocial and Contextual Factors:         Past Medical History:   Diagnosis Date    Bipolar affective ([facility]     Cerebral palsy ([facility]     Chicken pox           TREATMENT PLAN  Risk Management:  close watch per standard protocol  Psychotherapy:  participation in milieu and group and individual sessions with Attending Physician,  and Physician Assistant/CNP  Reason for Admission to Psychiatric Unit:  Threat to self, psychosis  Estimated length of stay:  Greater than two midnights will be required to reach therapeutic levels of medications and to stabilize mood to where step down and management in a less restrictive environment is appropriate      GENERAL PATIENT/FAMILY EDUCATION  Patient will understand basic signs and symptoms, Patient will understand benefits/risks and potential side effects from proposed meds and Patient will understand their role in recovery. Family is  active in patient's care. Patient assets that may be helpful during treatment include: Intent to participate and engage in treatment, sufficient fund of knowledge and intellect to understand and utilize treatments. Goals:    Resume home medications as prescribed  Encouraged patient to engage in groups, milieu, and individual therapies offered as part of programing. Behavioral Services  Medicare Certification Upon Admission    I certify that this patient's inpatient psychiatric hospital admission is medically necessary for:   X (1) Treatment which could reasonably be expected to improve this patient's condition,      X (2) Or for diagnostic study;     AND     X (2) The inpatient psychiatric services are provided while the individual is under the care of a physician and are included in the individualized plan of care. Estimated length of stay/service: Greater than two midnights will be required to reach therapeutic levels of medications and to stabilize mood    Plan for post-hospital care:  Follow up with outpatient services    Yani Barrett is a 34 y.o. female being evaluated by a Virtual Visit (video visit) encounter to address concerns as mentioned above. A caregiver was present in the room along with the patient. Pursuant to the emergency declaration under the [facility], [address] [place] [address] authority and the [employer] and [employer] Act, this Virtual Visit was conducted with patient's (and/or legal guardian's) consent, to reduce the patient's risk of exposure to COVID-19 and provide necessary medical care. Services were provided through a video synchronous discussion virtually to substitute for in-person visit by provider. Patient is present at [address] [place] [address] on unit [address] and I am physically present at my home in [address] on 6/25/2020 at 9:29 AM     An electronic signature was used to authenticate this note. Psychiatry Attending Attestation     I assessed this patient and reviewed the case and plan of care with [place], PAWanda. I have reviewed the above documentation and I agree with the findings and treatment plan with the following updates. Patient is a 20-year-old single  female with history of mood disorder admitted for severe nicole and worsening impulsive behaviors. Patient initially went to [facility] professional services from which she was transferred to the [facility] emergency department due to worsening behaviors. Patient has rapid speech, irritable mood, racing thoughts, increased energy \"feels like running into the walls\" and impulsive sexual behaviors. Patient was exhibiting significant thought disorganization. It is unclear if patient has been compliant with her medications since recent discharge from . We will obtain medical records from [facility] professional services. Case discussed with staff and treatment team this morning. More than 16 mins of the session was spent doing Supportive psychotherapy. Session lasted for over 30 mins.    Manuela Husbands Gerardo Díaz is a 34 y.o. female being evaluated by a Virtual Visit (video visit) encounter to address concerns as mentioned above. A caregiver was present in the room along with the patient. Pursuant to the emergency declaration under the [facility], [address] [place] [address] authority and the [employer] and [employer] Act, this Virtual Visit was conducted with patient's (and/or legal guardian's) consent, to reduce the patient's risk of exposure to COVID-19 and provide necessary medical care. Services were provided through a video synchronous discussion virtually to substitute for in-person visit by provider. Patient is present at [address] [place] [address] on unit 4E and I am physically present at my home in hospitals     --Gabriela Cornejo MD on 6/25/2020 at 5:54 PM    An electronic signature was used to authenticate this note. **This report has been created using voice recognition software. It may contain minor errors which are inherent in voice recognition technology. **

## 2020-06-26 PROCEDURE — 99232 SBSQ HOSP IP/OBS MODERATE 35: CPT | Performed by: PSYCHIATRY & NEUROLOGY

## 2020-06-26 PROCEDURE — 1240000000 HC EMOTIONAL WELLNESS R&B

## 2020-06-26 PROCEDURE — APPSS30 APP SPLIT SHARED TIME 16-30 MINUTES: Performed by: PHYSICIAN ASSISTANT

## 2020-06-26 PROCEDURE — 90833 PSYTX W PT W E/M 30 MIN: CPT | Performed by: PSYCHIATRY & NEUROLOGY

## 2020-06-26 PROCEDURE — 6370000000 HC RX 637 (ALT 250 FOR IP): Performed by: PHYSICIAN ASSISTANT

## 2020-06-26 RX ORDER — ARIPIPRAZOLE 10 MG/1
20 TABLET ORAL DAILY
Status: DISCONTINUED | OUTPATIENT
Start: 2020-06-27 | End: 2020-06-27

## 2020-06-26 RX ADMIN — DIVALPROEX SODIUM 500 MG: 500 TABLET, EXTENDED RELEASE ORAL at 07:36

## 2020-06-26 RX ADMIN — DIVALPROEX SODIUM 1000 MG: 500 TABLET, EXTENDED RELEASE ORAL at 20:51

## 2020-06-26 RX ADMIN — ARIPIPRAZOLE 15 MG: 15 TABLET ORAL at 07:36

## 2020-06-26 ASSESSMENT — PAIN - FUNCTIONAL ASSESSMENT: PAIN_FUNCTIONAL_ASSESSMENT: ACTIVITIES ARE NOT PREVENTED

## 2020-06-26 ASSESSMENT — PAIN SCALES - GENERAL: PAINLEVEL_OUTOF10: 0

## 2020-06-26 NOTE — PLAN OF CARE
Patient has attended at least 2 groups today and has also been out of her room for socialization with others so she has met her socialization goal.

## 2020-06-26 NOTE — PLAN OF CARE
Problem: Altered Mood, Manic Behavior:  Goal: Able to verbalize decrease in frequency and intensity of racing thoughts  Description: Able to verbalize decrease in frequency and intensity of racing thoughts  6/26/2020 0818 by Nova Rosa RN  Outcome: Met This Shift  Note: Pt denied any racing thoughts at this time  6/25/2020 2353 by Livia Kidd RN  Outcome: Not Met This Shift  Note: Pt continues to have racing thoughts and exaggerated behaviors  Goal: Ability to achieve adequate nutritional intake will improve  Description: Ability to achieve adequate nutritional intake will improve  6/26/2020 0818 by Nova Rosa RN  Outcome: Met This Shift  Note: Pt eating well without issues or concerns   6/25/2020 2353 by Livia Kidd RN  Outcome: Ongoing  Note: Pt eating well and had 100% snack     Problem: KNOWLEDGE DEFICIT,EDUCATION,DISCHARGE PLAN  Goal: Knowledge - personal safety  Outcome: Ongoing  Note: Maintained in safe and secure environment. Problem: Discharge Planning:  Goal: Discharged to appropriate level of care  Description: Discharged to appropriate level of care  6/26/2020 0818 by Nova Rosa RN  Outcome: Ongoing  Note: Patient voices no  needs before discharge. Patient plans to be discharged to home Discharge planner working with patient to achieve optimal discharge plans, specific to individual needs. 6/25/2020 2353 by Livia Kidd RN  Outcome: Ongoing  Note: Pt to be discharged to [facility] and follow with Smita     Problem: Altered Mood, Manic Behavior:  Goal: Able to sleep  Description: Able to sleep  6/26/2020 0818 by Nova Rosa RN  Outcome: Ongoing  Note: Patient slept 6 hours last night, reports they  slept well. Encourage patient not to take naps during the day, relax several hours before bed and to take prescribed sleep meds as ordered. Patient verbalized understanding.     6/25/2020 2353 by Livia Kidd RN  Outcome: Ongoing  Note: Pt resting quietly with no distress noted  Goal: Ability to interact with others will improve  Description: Ability to interact with others will improve  6/26/2020 0818 by Tanner Broderick RN  Outcome: Ongoing  Note: Pt interacting well with peers   6/25/2020 2353 by Demar Vital RN  Outcome: Ongoing  Note: Pt interacting well with room mate  Goal: Ability to demonstrate self-control will improve  Description: Ability to demonstrate self-control will improve  6/26/2020 0818 by Tanner Broderick RN  Outcome: Ongoing  Note: Pt able to control speech and actions at this time   6/25/2020 2353 by Demar Vital RN  Outcome: Ongoing  Note: Pt hyperactive but does control self  Goal: Mood stable  Description: Mood stable  6/26/2020 0818 by Tanner Broderick RN  Outcome: Ongoing  Note: Pt calm and peasant this am . Good eye contact. pt rated mood at a 6 out of 10   6/25/2020 2353 by Demar Vital RN  Outcome: Not Met This Shift  Note: Pt is labile and continues to be exaggerated and hyperactive  Goal: Patient specific goal  Description: Patient specific goal  6/26/2020 0818 by Tanner Broderick RN  Outcome: Ongoing  Note: Patient reports goal for today is to keep sprits up  6/25/2020 2353 by Demar Vital RN  Outcome: Ongoing  Note: Pt working on goal of finding someone to trust     Problem: Anxiety:  Goal: Level of anxiety will decrease  Description: Level of anxiety will decrease  6/26/2020 0818 by Tanner Broderick RN  Outcome: Ongoing  Note: Patient reports denied  anxiety.    6/25/2020 2353 by Demar Vital RN  Outcome: Ongoing  Note: Pt denies anxiety     Problem: Altered Mood, Manic Behavior:  Goal: Ability to disclose and discuss suicidal ideas will improve  Description: Ability to disclose and discuss suicidal ideas will improve  6/26/2020 0818 by Tanner Broderick RN  Outcome: Completed  Note: Pt denied any SI at this time  6/25/2020 2353 by Demar Vital RN  Outcome: Ongoing  Note: Pt denies self harm thoughts  Goal: Absence of self-harm  Description: Absence of self-harm  6/26/2020 0818 by Dariusz Oliver RN  Outcome: Completed  Note: No self harm behaviors were observed or reported so far this shift. Remains on every 15 minutes precautions for safety. 6/25/2020 2353 by Franklin Mares RN  Outcome: Ongoing  Note: Pt denies self harm thoughts     Problem: Activity:  Goal: Physical symptoms of sleep deprivation will improve  Description: Physical symptoms of sleep deprivation will improve  6/26/2020 0818 by Dariusz Oliver RN  Outcome: Completed  Note: Patient slept 6 hours last night, reports they slept well. Encourage patient not to take naps during the day, relax several hours before bed and to take prescribed sleep meds as ordered. Patient verbalized understanding.     6/25/2020 2353 by Franklin Mares RN  Outcome: Ongoing  Note: Pt resting quietly with no distress noted

## 2020-06-26 NOTE — FLOWSHEET NOTE
Spiritual Support Group Note    Number of Participants in Group: 8                              Time: 1500    Goal: Relief from isolation and loneliness             Katie Sharing             Self-understanding and gain insight              Acceptance and belonging            Recognize they are not alone                Socialization             Empowerment       Encouragement    Topic:  [] Spiritual Wellness and Self Care                  [x] Hope                     [x] Connecting with Divine/Others        [] Thankfulness and Gratitude               []  Meaningfulness and Purpose               [] Forgiveness               [] Peace               [] Connect to [employer]     [] Other:    Participation Level:   [x] Active Listener   [] Minimal   [] Monopolizing   [] Interactive   [] No Participation   []  Other:     Attention:   [x] Alert   [] Distractible   [] Drowsy   [] Poor   [] Other:    Manner:   [x] Cooperative   [] Suspicious   [] Withdrawn   [] Guarded   [] Irritable   [] Inhospitable   [] Other:     Others Comments from Group: Radha participated in the spirituality group. We discussed the Serenity prayer.

## 2020-06-26 NOTE — PROGRESS NOTES
Department of Psychiatry  Progress Note     Chief Complaint:  Schizoaffective disorder, bipolar type ([address]     SUBJECTIVE:    PROGRESS:  Yaya Webber reports she is doing better today. Continues to not be fully oriented to situation. Feels she was admitted because \"they took me to [facility] because I started doing dangerous behavior. I wasn't able to sleep. \"   She rates her mood a 6/10 with 10 being the best  Denies thoughts of harm to herself or others. She feels her thoughts have slowed down today  She states that her energy feels more drained today. She does not feel she has so much energy that she could run through the wall. Yaya Webber states she received her [place] on June 24th. I spoke to David Haro from [facility] and she states the last time Yaya Webber was seen by Donal Morfin was on 06/08/2020. She also last received her [place] was on 06/12/2020. She is next due on 07/10/2020. She continues to feel paranoid about the \"shots\" [facility] gave her.     Suicidal ideations: denies    Compliance with medications: good   Medication side effects: absent  ROS: Patient has new complaints:  no  Sleep quality: 6 hours broken last night verfiied  Attending groups: yes      OBJECTIVE      Medications  Current Facility-Administered Medications: acetaminophen (TYLENOL) tablet 650 mg, 650 mg, Oral, Q4H PRN  ibuprofen (ADVIL;MOTRIN) tablet 400 mg, 400 mg, Oral, Q6H PRN  hydrOXYzine (ATARAX) tablet 50 mg, 50 mg, Oral, TID PRN  traZODone (DESYREL) tablet 50 mg, 50 mg, Oral, Nightly PRN  magnesium hydroxide (MILK OF MAGNESIA) 400 MG/5ML suspension 30 mL, 30 mL, Oral, Daily PRN  aluminum & magnesium hydroxide-simethicone (MAALOX) 200-200-20 MG/5ML suspension 30 mL, 30 mL, Oral, Q6H PRN  traZODone (DESYREL) tablet 50 mg, 50 mg, Oral, Once  nicotine polacrilex (COMMIT) lozenge 2 mg, 2 mg, Oral, PRN  ARIPiprazole (ABILIFY) tablet 15 mg, 15 mg, Oral, Daily  divalproex (DEPAKOTE ER) extended release tablet 500 mg, 500 mg, Oral, Daily  divalproex (DEPAKOTE ER) extended release tablet 1,000 mg, 1,000 mg, Oral, Nightly  melatonin ER tablet 5 mg, 5 mg, Oral, Nightly PRN     Physical     height is 5' 5\" (1.651 m) and weight is 155 lb (70.3 kg). Her oral temperature is 97.4 °F (36.3 °C). Her blood pressure is 107/65 and her pulse is 77. Her respiration is 16 and oxygen saturation is 97%. Lab Results   Component Value Date    WBC 13.8 (H) 06/24/2020    HGB 11.7 (L) 06/24/2020    HCT 36.8 (L) 06/24/2020     06/24/2020    CHOL 134 08/16/2019    TRIG 83 08/16/2019    HDL 37 (L) 08/16/2019    ALT 11 06/24/2020    AST 14 06/24/2020     06/24/2020    K 4.1 06/24/2020     06/24/2020    CREATININE 0.9 06/24/2020    BUN 20 06/24/2020    CO2 21 (L) 06/24/2020    TSH 2.200 06/24/2020          Mental Status Exam:   Level of consciousness:  Mild dysattention  Appearance:  well-appearing, street clothes and in chair  Behavior/Motor:  hyperactive  Attitude toward examiner:  cooperative and attentive  Speech:  normal rate and normal volume  Mood:  Euthymic  Affect:  labile  Thought processes: more linear today  Thought content:  Denies homicidal ideation  Suicidal Ideation:  denies suicidal ideation  Delusions:  Paranoid at times  Perceptual Disturbance:  denies any perceptual disturbance  Cognition: Patient is oriented to person, place, time  Concentration: poor at times  Memory: impaired recent memory  Insight & Judgement: limited       ASSESSMENT     Schizoaffective disorder, bipolar type ([facility]     PLAN    Patient's symptoms show slight improvement today  Increase Abilify to 20mg daily  Add Depakote level for tomorrow morning  Attempt to develop insight, psycho-education and supportive therapy conducted. Probable discharge: TBD  Follow-up: Ken[place]    Electronically signed by Rosemarie Hooker PA-C on 6/26/2020 at 1:04 PM Reviewed patient's current plan of care and vital signs with nursing staff.

## 2020-06-26 NOTE — GROUP NOTE
Group Therapy Note    Date: 6/26/2020    Group Start Time: 1330  Group End Time: 1400  Group Topic: Healthy Living/Wellness    [facility] 4E    James Horton LPN        Group Therapy Note    Attendees: 8             Notes:  attended    Status After Intervention:  Improved    Participation Level:  Active Listener    Participation Quality: Appropriate and Attentive      Speech:  normal      Thought Process/Content: Logical      Affective Functioning: Flat      Level of consciousness:  Alert and Attentive      Response to Learning: Able to verbalize/acknowledge new learning      Endings: None Reported    Modes of Intervention: Education, Support and Socialization      Discipline Responsible: Licensed Practical Nurse      Signature:  James Horton LPN

## 2020-06-26 NOTE — PLAN OF CARE
2353 by Grisel Stevens RN  Outcome: Ongoing  Note: Pt denies self harm thoughts  6/25/2020 1138 by Filippo Gatica RN  Outcome: Ongoing  Note: No self harm behaviors were observed or reported so far this shift. Remains on every 15 minutes precautions for safety. Goal: Ability to achieve adequate nutritional intake will improve  Description: Ability to achieve adequate nutritional intake will improve  6/25/2020 2353 by Grisel Stevens RN  Outcome: Ongoing  Note: Pt eating well and had 100% snack  6/25/2020 1138 by Filippo Gatica RN  Outcome: Ongoing  Note: Patient eating 100% of meals today. Encourage patient to drink supplements for increase calorie intake. Goal: Ability to interact with others will improve  Description: Ability to interact with others will improve  6/25/2020 2353 by Grisel Stevens RN  Outcome: Ongoing  Note: Pt interacting well with room mate  6/25/2020 1138 by Filippo Gatica RN  Outcome: Ongoing  Note: Patient interacting with roommate. Goal: Ability to demonstrate self-control will improve  Description: Ability to demonstrate self-control will improve  6/25/2020 2353 by Grisel Stevens RN  Outcome: Ongoing  Note: Pt hyperactive but does control self  6/25/2020 1138 by Filippo Gatica RN  Outcome: Ongoing  Note: Patient has rapid speech at times, flight ideas and loose association. Goal: Patient specific goal  Description: Patient specific goal  6/25/2020 2353 by Grisel Stevens RN  Outcome: Ongoing  Note: Pt working on goal of finding someone to trust  6/25/2020 1138 by Filippo Gatica RN  Outcome: Ongoing  Note: Patient reports goal for today is to take a shower. This was met. Problem: Anxiety:  Goal: Level of anxiety will decrease  Description: Level of anxiety will decrease  6/25/2020 2353 by Grisel Stevens RN  Outcome: Ongoing  Note: Pt denies anxiety  6/25/2020 1138 by Filippo Gatica RN  Outcome: Ongoing  Note: Patient reports \"always\" regarding anxiety.  Pt taking Atarax prn. Verbalized medication was effective. Problem: Activity:  Goal: Physical symptoms of sleep deprivation will improve  Description: Physical symptoms of sleep deprivation will improve  6/25/2020 2353 by Maia Anthony RN  Outcome: Ongoing  Note: Pt resting quietly with no distress noted  6/25/2020 1138 by Cyndie Bullock RN  Outcome: Ongoing  Note: Patient slept 3.5 hours last night, reports she slept ok. Encourage patient not to take naps during the day, relax several hours before bed and to take prescribed sleep meds as ordered. Patient verbalized understanding. Problem: Altered Mood, Manic Behavior:  Goal: Able to verbalize decrease in frequency and intensity of racing thoughts  Description: Able to verbalize decrease in frequency and intensity of racing thoughts  6/25/2020 2353 by Maia Anthony RN  Outcome: Not Met This Shift  Note: Pt continues to have racing thoughts and exaggerated behaviors  6/25/2020 1138 by Cyndie Bullock RN  Outcome: Ongoing  Note: Patient reports racing thoughts this am.  Goal: Mood stable  Description: Mood stable  6/25/2020 2353 by Maia Anthony RN  Outcome: Not Met This Shift  Note: Pt is labile and continues to be exaggerated and hyperactive  6/25/2020 1138 by Cyndie Bullock RN  Outcome: Ongoing  Note: Patient reports mood as \"up and down\". Has elevated and bright affect. Speech clear and rapid at times. Good eye contact. Reports \"depends\" regarding hope for future and identifies as their support system. Care plan reviewed with patient and verbalize understanding of the plan of care and contribute to goal setting.

## 2020-06-26 NOTE — PATIENT CARE CONFERENCE
[address] [place] [address]  Day 3 Interdisciplinary Treatment Plan NOTE    Review Date & Time: 6/26/20 1503    Patient was in treatment team    Admission Type:   Admission Type: Voluntary    Reason for admission:  Reason for Admission: Unsure  Estimated Length of Stay Update:  3-5 days  Estimated Discharge Date Update: 3-5 days    PATIENT STRENGTHS:  Patient Strengths Strengths: Connection to output provider  Patient Strengths and Limitations:Limitations: Difficult relationships / poor social skills  Addictive Behavior:Addictive Behavior  In the past 3 months, have you felt or has someone told you that you have a problem with:  : None  Do you have a history of Chemical Use?: No  Do you have a history of Alcohol Use?: No  Do you have a history of Street Drug Abuse?: No  Histroy of Prescripton Drug Abuse?: No  Medical Problems:  Past Medical History:   Diagnosis Date    Bipolar affective ([facility]     Cerebral palsy ([facility]     Chicken pox        Risk:  Fall RiskTotal: 53  Yusef Scale Yusef Scale Score: 22  BVC Total: 0  Change in scores: No. Changes to plan of Care: No    Status EXAM:   Status and Exam  Normal: No  Facial Expression: Elevated, Exaggerated  Affect: Inappropriate  Level of Consciousness: Alert  Mood:Normal: No  Mood: Anxious  Motor Activity:Normal: Yes  Motor Activity: Increased  Interview Behavior: Cooperative  Preception: [place] to Person, [place] to Time, [place] to Place, [place] to Situation  Attention:Normal: Yes  Attention: Distractible, Hyperalert  Thought Processes: Flt.of Ideas, Loose Assoc. Thought Content:Normal: Yes  Thought Content: Preoccupations  Hallucinations: None  Delusions: No  Delusions:  Other(See Comment)(paranoia)  Memory:Normal: No  Memory: Poor Remote  Insight and Judgment: No  Insight and Judgment: Poor Judgment, Poor Insight, Unrealistic  Present Suicidal Ideation: No  Present Homicidal Ideation: No    Daily Assessment Last Entry:   Daily Sleep (WDL): Within Defined Limits

## 2020-06-26 NOTE — PLAN OF CARE
Problem: KNOWLEDGE DEFICIT,EDUCATION,DISCHARGE PLAN  Goal: Knowledge - personal safety  6/26/2020 1549 by Titi Macdonald LPN  Outcome: Completed  Note: 1. Warning signs that happen when I am distressed or experience harmful thoughts  start shaking  2. Activities that I can do to cope in a healthy way when I am stressed or distressed   music, sing, dance  3. List of roadblocks that keep me from using healthy coping skills  my own thoughts sometimes  4. List of my support persons  none    EMERGENCY CONTACTS:  -National suicide prevention life line 3-204-006-557-748-8671  -24 hour crisis line 1-800-HOPE (8755)  -Domestic violence hotline 1-491-569-SAFE (7212)  -Test CONNECT to 429015 to chat with a trained crisis counselor 24 hours/day 7 days a week  -CALL 911   6/26/2020 0818 by Renetta Thorpe RN  Outcome: Ongoing  Note: Maintained in safe and secure environment. Problem: Altered Mood, Manic Behavior:  Goal: Ability to disclose and discuss suicidal ideas will improve  Description: Ability to disclose and discuss suicidal ideas will improve  6/26/2020 0818 by Renetta Thorpe RN  Outcome: Completed  Note: Pt denied any SI at this time  Goal: Absence of self-harm  Description: Absence of self-harm  6/26/2020 0818 by Renetta Thorpe RN  Outcome: Completed  Note: No self harm behaviors were observed or reported so far this shift. Remains on every 15 minutes precautions for safety. Problem: Activity:  Goal: Physical symptoms of sleep deprivation will improve  Description: Physical symptoms of sleep deprivation will improve  6/26/2020 0818 by Renetta Thorpe RN  Outcome: Completed  Note: Patient slept 6 hours last night, reports they slept well. Encourage patient not to take naps during the day, relax several hours before bed and to take prescribed sleep meds as ordered. Patient verbalized understanding.

## 2020-06-27 LAB — VALPROIC ACID LEVEL: 91.3 UG/ML (ref 50–100)

## 2020-06-27 PROCEDURE — 99232 SBSQ HOSP IP/OBS MODERATE 35: CPT | Performed by: PSYCHIATRY & NEUROLOGY

## 2020-06-27 PROCEDURE — 80164 ASSAY DIPROPYLACETIC ACD TOT: CPT

## 2020-06-27 PROCEDURE — 90833 PSYTX W PT W E/M 30 MIN: CPT | Performed by: PSYCHIATRY & NEUROLOGY

## 2020-06-27 PROCEDURE — 6370000000 HC RX 637 (ALT 250 FOR IP): Performed by: PHYSICIAN ASSISTANT

## 2020-06-27 PROCEDURE — APPSS30 APP SPLIT SHARED TIME 16-30 MINUTES: Performed by: PHYSICIAN ASSISTANT

## 2020-06-27 PROCEDURE — 1240000000 HC EMOTIONAL WELLNESS R&B

## 2020-06-27 PROCEDURE — 36415 COLL VENOUS BLD VENIPUNCTURE: CPT

## 2020-06-27 RX ORDER — ARIPIPRAZOLE 15 MG/1
30 TABLET ORAL DAILY
Status: DISCONTINUED | OUTPATIENT
Start: 2020-06-27 | End: 2020-06-29 | Stop reason: HOSPADM

## 2020-06-27 RX ADMIN — DIVALPROEX SODIUM 500 MG: 500 TABLET, EXTENDED RELEASE ORAL at 10:20

## 2020-06-27 RX ADMIN — DIVALPROEX SODIUM 1000 MG: 500 TABLET, EXTENDED RELEASE ORAL at 21:13

## 2020-06-27 RX ADMIN — ARIPIPRAZOLE 30 MG: 15 TABLET ORAL at 12:15

## 2020-06-27 RX ADMIN — Medication 5 MG: at 21:13

## 2020-06-27 ASSESSMENT — PAIN SCALES - GENERAL
PAINLEVEL_OUTOF10: 0
PAINLEVEL_OUTOF10: 0

## 2020-06-27 NOTE — PLAN OF CARE
Problem: Altered Mood, Manic Behavior:  Goal: Able to verbalize decrease in frequency and intensity of racing thoughts  Description: Able to verbalize decrease in frequency and intensity of racing thoughts  6/27/2020 1307 by Lo Blakely RN  Outcome: Met This Shift  Note: Pt denies racing thoughts  6/27/2020 0134 by Susanne Stephens RN  Outcome: Ongoing  Note: Patient denies hallucinations and racing thoughts. Goal: Ability to demonstrate self-control will improve  Description: Ability to demonstrate self-control will improve  6/27/2020 1307 by Lo Blakely RN  Outcome: Met This Shift  Note: Pt maintains appropriate self control  6/27/2020 0134 by Susanne Stephens RN  Outcome: Ongoing  Note: Ongoing. Problem: Discharge Planning:  Goal: Discharged to appropriate level of care  Description: Discharged to appropriate level of care  6/27/2020 1307 by Lo Blakely RN  Outcome: Ongoing  Note: Pt working with treatment team for optimal discharge plans  6/27/2020 0134 by Susanne Stephens RN  Outcome: Ongoing  Note: Discharge planning in progress, patient not discharged this evening. Problem: Altered Mood, Manic Behavior:  Goal: Able to sleep  Description: Able to sleep  6/27/2020 1307 by Lo Blakely RN  Outcome: Ongoing  Note: 3.5 hours broken sleep  6/27/2020 0134 by Susanne Stephens RN  Outcome: Ongoing  Note: Within normal limits. Goal: Ability to achieve adequate nutritional intake will improve  Description: Ability to achieve adequate nutritional intake will improve  6/27/2020 1307 by Lo Blakely RN  Outcome: Ongoing  Note: Pt drinking fluids and eating optimal nutrition  6/27/2020 0134 by Susanne Stephens RN  Outcome: Met This Shift  Note: Patient ate 100% bedtime snack.   Goal: Ability to interact with others will improve  Description: Ability to interact with others will improve  6/27/2020 1307 by Viri Gonzales RN  Outcome: Ongoing  Note: Pt out on unit interacting with peers  6/27/2020 0134 by Castillo Carney RN  Outcome: Met This Shift  Note: Patient interacts well with peers. Goal: Mood stable  Description: Mood stable  6/27/2020 1307 by Viri Gonzales RN  Outcome: Ongoing  Note: Pt reports \"my mood is fair\"  6/27/2020 0134 by Castillo Carney RN  Outcome: Met This Shift  Note: Mood stable this evening. Goal: Patient specific goal  Description: Patient specific goal  6/27/2020 1307 by Viri Gonzales RN  Outcome: Ongoing  Note: \"To choose my words more wisely\"  6/27/2020 0134 by Castillo Carney RN  Outcome: Met This Shift  Note: Patient made a goal today. Problem: Anxiety:  Goal: Level of anxiety will decrease  Description: Level of anxiety will decrease  6/27/2020 1307 by Viri Gonzales RN  Outcome: Ongoing  Note: Pt reports having high anxiety when another peers was screaming and slamming the door. Processed uncomfortable emotions with high stress environment. Support provided and pt shared she will concentrate on developing artwork by drawing or coloring  6/27/2020 0134 by Castillo Carney RN  Outcome: Met This Shift  Note: Patient denies anxiety this evening. Care plan reviewed with patient. Patient does  verbalize understanding of the plan of care and did contribute to goal setting.

## 2020-06-27 NOTE — PLAN OF CARE
Problem: Discharge Planning:  Goal: Discharged to appropriate level of care  Description: Discharged to appropriate level of care  Outcome: Ongoing  Note: Discharge planning in progress, patient not discharged this evening. Problem: Altered Mood, Manic Behavior:  Goal: Able to sleep  Description: Able to sleep  Outcome: Ongoing  Note: Within normal limits. Goal: Able to verbalize decrease in frequency and intensity of racing thoughts  Description: Able to verbalize decrease in frequency and intensity of racing thoughts  Outcome: Ongoing  Note: Patient denies hallucinations and racing thoughts. Goal: Ability to achieve adequate nutritional intake will improve  Description: Ability to achieve adequate nutritional intake will improve  Outcome: Met This Shift  Note: Patient ate 100% bedtime snack. Goal: Ability to interact with others will improve  Description: Ability to interact with others will improve  Outcome: Met This Shift  Note: Patient interacts well with peers. Goal: Ability to demonstrate self-control will improve  Description: Ability to demonstrate self-control will improve  Outcome: Ongoing  Note: Ongoing. Goal: Mood stable  Description: Mood stable  Outcome: Met This Shift  Note: Mood stable this evening. Goal: Patient specific goal  Description: Patient specific goal  Outcome: Met This Shift  Note: Patient made a goal today. Problem: Anxiety:  Goal: Level of anxiety will decrease  Description: Level of anxiety will decrease  Outcome: Met This Shift  Note: Patient denies anxiety this evening. Care plan reviewed with patient. Patient does verbalize understanding of the plan of care and does contribute to goal setting.

## 2020-06-27 NOTE — PROGRESS NOTES
Group Therapy Note    Date: 06/26/2020  Start Time: 2000  End Time:  2020      Type of Group: Relaxation      Status After Intervention:  Unchanged    Participation Level:  Active Listener and Interactive    Participation Quality: Appropriate, Attentive and Sharing      Speech:  normal      Thought Process/Content: Logical      Affective Functioning: Congruent      Mood: calm      Level of consciousness:  Alert      Response to Learning: Progressing to goal      Endings: None Reported    Modes of Intervention: Support and Socialization      Discipline Responsible: Registered Nurse      Signature:  Reddy Warner RN

## 2020-06-27 NOTE — PROGRESS NOTES
Department of Psychiatry  Progress Note     Chief Complaint:  Schizoaffective disorder, bipolar type ([address]     SUBJECTIVE:    PROGRESS:  Adrian reports she is doing better today. Reports her mood as \"fair\"   She wants to be discharged today. She feels is still admitted because of her Depakote level. She asks why the provider cannot call her on her personal phone and tell her what her Depakote level was. Continues to not be fully oriented to situation. Feels she was admitted because \"they sent for me an assessment at [facility] and they didn't have any beds so they sent me here. \"   She feels her mind has slowed down. Does not have excessive energy today. She states the shot that [facility] gave her caused her to feel that way prior to admission. Continues to feel paranoid regarding the shots [facility] gave her. She denies prostituting herself prior to admission. She does not remember saying that to the staff at [facility]. She states she slept okay. Staff reports she only slept 3.5 hours broken last night. Her Depakote level today was 91.3. Will continue current dose of Depakote.      Suicidal ideations: denies    Compliance with medications: good   Medication side effects: absent  ROS: Patient has new complaints:  no  Sleep quality: 3.5 hours broken last night verfiied  Attending groups: yes      OBJECTIVE      Medications  Current Facility-Administered Medications: ARIPiprazole (ABILIFY) tablet 20 mg, 20 mg, Oral, Daily  acetaminophen (TYLENOL) tablet 650 mg, 650 mg, Oral, Q4H PRN  ibuprofen (ADVIL;MOTRIN) tablet 400 mg, 400 mg, Oral, Q6H PRN  hydrOXYzine (ATARAX) tablet 50 mg, 50 mg, Oral, TID PRN  traZODone (DESYREL) tablet 50 mg, 50 mg, Oral, Nightly PRN  magnesium hydroxide (MILK OF MAGNESIA) 400 MG/5ML suspension 30 mL, 30 mL, Oral, Daily PRN  aluminum & magnesium hydroxide-simethicone (MAALOX) 200-200-20 MG/5ML suspension 30 mL, 30 mL, Oral, Q6H PRN  traZODone (DESYREL) tablet 50 mg, 50 mg, Oral, Virtual Visit (video visit) encounter to address concerns as mentioned above. A caregiver was present in the room along with the patient. Pursuant to the emergency declaration under the [facility], [address] [place] [address] authority and the [employer] Act, this Virtual Visit was conducted with patient's (and/or legal guardian's) consent, to reduce the patient's risk of exposure to COVID-19 and provide necessary medical care. Services were provided through a video synchronous discussion virtually to substitute for in-person visit by provider. Patient is present at [address] [place] [address] on [address] and I am physically present at my home in [place] [address] YADIRA on 6/27/2020 at 1:17 PM     An electronic signature was used to authenticate this note. Electronically signed by Mitzi Wilson PA-C on 6/27/2020 at 8:13 AM Reviewed patient's current plan of care and vital signs with nursing staff. Psychiatry Attending Attestation     I assessed this patient and reviewed the case and plan of care with Mitzi Wilson PA-C. I have reviewed the above documentation and I agree with the findings and treatment plan with the following updates. Patient continues to have limited insight into her mental health. Believes that she is here because of Depakote level. She is not oriented to situation. Notes that her mind is not racing anymore and is sleeping better. Tolerating the medications well. Denies any side effect from the medication. Continues to exhibit some pressured speech. He is agreeing to go back and live at [facility] on discharge. Case discussed with staff and treatment team this morning. More than 16 mins of the session was spent doing Supportive psychotherapy. Session lasted for over 30 mins.    Andie Villalpando is a 34 y.o. female being evaluated by a Virtual Visit (video visit) encounter to address concerns as mentioned above. A caregiver was present in the room along with the patient. Pursuant to the emergency declaration under the [facility], [address] [place] [address] authority and the [employer] and [employer] Act, this Virtual Visit was conducted with patient's (and/or legal guardian's) consent, to reduce the patient's risk of exposure to COVID-19 and provide necessary medical care. Services were provided through a video synchronous discussion virtually to substitute for in-person visit by provider. Patient is present at [address] [place] [address] on [address] and I am physically present at my home in [facility]     --Eric Horton MD on 6/27/2020 at 5:14 PM    An electronic signature was used to authenticate this note. **This report has been created using voice recognition software. It may contain minor errors which are inherent in voice recognition technology. **

## 2020-06-28 PROCEDURE — 99232 SBSQ HOSP IP/OBS MODERATE 35: CPT | Performed by: PSYCHIATRY & NEUROLOGY

## 2020-06-28 PROCEDURE — 6370000000 HC RX 637 (ALT 250 FOR IP): Performed by: PHYSICIAN ASSISTANT

## 2020-06-28 PROCEDURE — APPSS30 APP SPLIT SHARED TIME 16-30 MINUTES: Performed by: PHYSICIAN ASSISTANT

## 2020-06-28 PROCEDURE — 1240000000 HC EMOTIONAL WELLNESS R&B

## 2020-06-28 PROCEDURE — 90833 PSYTX W PT W E/M 30 MIN: CPT | Performed by: PSYCHIATRY & NEUROLOGY

## 2020-06-28 RX ORDER — TRAZODONE HYDROCHLORIDE 100 MG/1
100 TABLET ORAL NIGHTLY PRN
Status: DISCONTINUED | OUTPATIENT
Start: 2020-06-28 | End: 2020-06-29 | Stop reason: HOSPADM

## 2020-06-28 RX ADMIN — DIVALPROEX SODIUM 1250 MG: 500 TABLET, EXTENDED RELEASE ORAL at 20:38

## 2020-06-28 RX ADMIN — DIVALPROEX SODIUM 500 MG: 500 TABLET, EXTENDED RELEASE ORAL at 09:21

## 2020-06-28 RX ADMIN — ARIPIPRAZOLE 30 MG: 15 TABLET ORAL at 09:21

## 2020-06-28 RX ADMIN — Medication 5 MG: at 20:38

## 2020-06-28 ASSESSMENT — PAIN SCALES - GENERAL
PAINLEVEL_OUTOF10: 0
PAINLEVEL_OUTOF10: 0

## 2020-06-28 NOTE — PROGRESS NOTES
extended release tablet 1,000 mg, 1,000 mg, Oral, Nightly  melatonin ER tablet 5 mg, 5 mg, Oral, Nightly PRN     Physical     height is 5' 5\" (1.651 m) and weight is 155 lb (70.3 kg). Her tympanic temperature is 96.4 °F (35.8 °C). Her blood pressure is 109/55 (abnormal) and her pulse is 61. Her respiration is 16 and oxygen saturation is 98%. Lab Results   Component Value Date    WBC 13.8 (H) 06/24/2020    HGB 11.7 (L) 06/24/2020    HCT 36.8 (L) 06/24/2020     06/24/2020    CHOL 134 08/16/2019    TRIG 83 08/16/2019    HDL 37 (L) 08/16/2019    ALT 11 06/24/2020    AST 14 06/24/2020     06/24/2020    K 4.1 06/24/2020     06/24/2020    CREATININE 0.9 06/24/2020    BUN 20 06/24/2020    CO2 21 (L) 06/24/2020    TSH 2.200 06/24/2020          Mental Status Exam:   Level of consciousness:  Mild dysattention  Appearance:  well-appearing, street clothes and in chair  Behavior/Motor:  hyperactive  Attitude toward examiner:  cooperative and attentive  Speech:  normal rate and normal volume  Mood:  \"tired\"  Affect:  blunted  Thought processes: loose associations  Thought content:  Denies homicidal ideation  Suicidal Ideation:  denies suicidal ideation  Delusions:  Paranoid at times  Perceptual Disturbance:  denies any perceptual disturbance  Cognition: Patient is oriented to person, place, time  Concentration: adequate  Memory: impaired recent memory  Insight & Judgement: limited       ASSESSMENT     Schizoaffective disorder, [facility]     PLAN    Patient's symptoms show slight improvement today  Will increase Depakote to 1250 nightly  Increase Trazodone to 100mg nightly  Attempt to develop insight, psycho-education and supportive therapy conducted. Probable discharge: 1-2 days  Follow-up: Alexis Lazaro is a 34 y.o. female being evaluated by a Virtual Visit (video visit) encounter to address concerns as mentioned above.   A caregiver was present in the room along with the patient. Pursuant to the emergency declaration under the [facility], [facility] authority and the [employer] and [employer] Act, this Virtual Visit was conducted with patient's (and/or legal guardian's) consent, to reduce the patient's risk of exposure to COVID-19 and provide necessary medical care. Services were provided through a video synchronous discussion virtually to substitute for in-person visit by provider. Patient is present at [address] [place] [address] on [address] and I am physically present at my home in [address] on 6/28/2020 at 8:13 AM     An electronic signature was used to authenticate this note. Psychiatry Attending Attestation     I assessed this patient and reviewed the case and [facility] I have reviewed the above documentation and I agree with the findings and treatment plan with the following updates. Staff reports that she slept very poor last night. Patient reports that she slept for 7 hours however staff mentions that she only slept for 4 hours. Continues to have some mood elevation and thought disorganization. Reports her energy is less and her thoughts are becoming more clear and organized. Discussed with her about increasing the dose of Depakote to help with sleep. She understood and agreed to the plan. Will discharge soon if she continues to improve and sleep better. Case discussed with staff and treatment team this morning. More than 16 mins of the session was spent doing Supportive psychotherapy. Session lasted for over 30 mins. Dell Loene is a 34 y.o. female being evaluated by a Virtual Visit (video visit) encounter to address concerns as mentioned above. A caregiver was present in the room along with the patient.  Pursuant to the emergency declaration under the [address] and the National Emergencies Act, [facility] waiver authority and the [employer] and [employer] Act, this Virtual Visit was conducted with patient's (and/or legal guardian's) consent, to reduce the patient's risk of exposure to COVID-19 and provide necessary medical care. Services were provided through a video synchronous discussion virtually to substitute for in-person visit by provider. Patient is present at [address] [place] [address] on [address] and I am physically present at my home in [facility]     --Kunal Adames MD on 6/28/2020 at 3:46 PM    An electronic signature was used to authenticate this note. **This report has been created using voice recognition software. It may contain minor errors which are inherent in voice recognition technology. **

## 2020-06-28 NOTE — PROGRESS NOTES
Group Therapy Note    Date: 6/27/2020  Start Time: 2000  End Time:  2015      Type of Group: Wrap-Up and relaxation      Patient's Goal:  \"To chose my words wisely\"    Notes:  Progressing toward    Status After Intervention:  Unchanged    Participation Level:  Active Listener    Participation Quality: Attentive      Speech:  normal      Thought Process/Content: Linear      Affective Functioning: Congruent      Mood: anxious      Level of consciousness:  Alert      Response to Learning: Able to verbalize/acknowledge new learning and Progressing to goal      Endings: None Reported    Modes of Intervention: Support and Socialization      Discipline Responsible: Registered Nurse      Signature:  Mahad Jj RN

## 2020-06-28 NOTE — PLAN OF CARE
Problem: Altered Mood, Manic Behavior:  Goal: Able to verbalize decrease in frequency and intensity of racing thoughts  Description: Able to verbalize decrease in frequency and intensity of racing thoughts  Outcome: Met This Shift  Note: Report decrease of racing thoughts  Goal: Ability to achieve adequate nutritional intake will improve  Description: Ability to achieve adequate nutritional intake will improve  Outcome: Met This Shift  Note: Eating well and drinking fluids freely     Problem: Discharge Planning:  Goal: Discharged to appropriate level of care  Description: Discharged to appropriate level of care  Outcome: Ongoing  Note: Pt working with treatment team for optimal discharge needs     Problem: Altered Mood, Manic Behavior:  Goal: Able to sleep  Description: Able to sleep  Outcome: Ongoing  Note: Pt has poor sleep 7.5 hr broken sleep  Goal: Ability to interact with others will improve  Description: Ability to interact with others will improve  Outcome: Ongoing  Note: At interacting with peers and attended group  Goal: Ability to demonstrate self-control will improve  Description: Ability to demonstrate self-control will improve  Outcome: Ongoing  Goal: Mood stable  Description: Mood stable  Outcome: Ongoing  Note: Pt reports having a good mood just \"tired\"  Goal: Patient specific goal  Description: Patient specific goal  Outcome: Ongoing  Note: \"ask Dr when I can be discharged\"     Problem: Anxiety:  Goal: Level of anxiety will decrease  Description: Level of anxiety will decrease  Outcome: Ongoing  Note: Pt shared with the loud and cussing comments from a male and female peer she was feeling anxius but when she went to lay down she began to feel less anxiety with exercising her deep breathing techniques   Care plan reviewed with patient. Patient does verbalize understanding of the plan of care and did  contribute to goal setting.

## 2020-06-28 NOTE — PLAN OF CARE
Patient reports that she feels that her thoughts are slowing down. Patient was able to stay on topic during the assessmen. 6/27/2020 1307 by Romayne Poplar, RN  Outcome: Met This Shift  Note: Pt denies racing thoughts  Goal: Ability to interact with others will improve  Description: Ability to interact with others will improve  6/27/2020 2019 by Haskell Ormond, RN  Outcome: Ongoing  Note: Patient interacts with a select female peer. 6/27/2020 1307 by Romayne Poplar, RN  Outcome: Ongoing  Note: Pt out on unit interacting with peers  Goal: Mood stable  Description: Mood stable  6/27/2020 2019 by Haskell Ormond, RN  Outcome: Ongoing  Note: Patient reports that her mood has been \" alright\" this shift. 6/27/2020 1307 by Romayne Poplar, RN  Outcome: Ongoing  Note: Pt reports \"my mood is fair\"     Problem: Anxiety:  Goal: Level of anxiety will decrease  Description: Level of anxiety will decrease  6/27/2020 2019 by Haskell Ormond, RN  Outcome: Ongoing  Note: Patient reports that she is \"always\" anxious. 6/27/2020 1307 by Romayne Poplar, RN  Outcome: Ongoing  Note: Pt reports having high anxiety when another peers was screaming and slamming the door. Processed uncomfortable emotions with high stress environment.  Support provided and pt shared she will concentrate on developing artwork by drawing or coloring

## 2020-06-29 VITALS
HEART RATE: 69 BPM | OXYGEN SATURATION: 98 % | BODY MASS INDEX: 25.83 KG/M2 | WEIGHT: 155 LBS | TEMPERATURE: 97.6 F | HEIGHT: 65 IN | DIASTOLIC BLOOD PRESSURE: 55 MMHG | RESPIRATION RATE: 16 BRPM | SYSTOLIC BLOOD PRESSURE: 109 MMHG

## 2020-06-29 PROCEDURE — 6370000000 HC RX 637 (ALT 250 FOR IP): Performed by: PHYSICIAN ASSISTANT

## 2020-06-29 PROCEDURE — 5130000000 HC BRIDGE APPOINTMENT

## 2020-06-29 PROCEDURE — 99239 HOSP IP/OBS DSCHRG MGMT >30: CPT | Performed by: PSYCHIATRY & NEUROLOGY

## 2020-06-29 RX ORDER — ARIPIPRAZOLE 30 MG/1
30 TABLET ORAL DAILY
Qty: 30 TABLET | Refills: 0 | Status: SHIPPED | OUTPATIENT
Start: 2020-06-30

## 2020-06-29 RX ORDER — TRAZODONE HYDROCHLORIDE 100 MG/1
100 TABLET ORAL NIGHTLY PRN
Qty: 30 TABLET | Refills: 0 | Status: SHIPPED | OUTPATIENT
Start: 2020-06-29 | End: 2020-09-17

## 2020-06-29 RX ORDER — DIVALPROEX SODIUM 250 MG/1
1250 TABLET, EXTENDED RELEASE ORAL NIGHTLY
Qty: 150 TABLET | Refills: 0 | Status: SHIPPED | OUTPATIENT
Start: 2020-06-29

## 2020-06-29 RX ADMIN — DIVALPROEX SODIUM 500 MG: 500 TABLET, EXTENDED RELEASE ORAL at 07:45

## 2020-06-29 RX ADMIN — ARIPIPRAZOLE 30 MG: 15 TABLET ORAL at 07:44

## 2020-06-29 ASSESSMENT — PAIN SCALES - GENERAL: PAINLEVEL_OUTOF10: 0

## 2020-06-29 NOTE — BH NOTE
24 hour chart review completed
24 hour chart review completed
Behavioral Health   Admission Note     Admission Type:   Admission Type: Voluntary    Reason for admission:  Reason for Admission: Unsure    PATIENT STRENGTHS:  Strengths: Connection to output provider    Patient Strengths and Limitations:  Limitations: Difficult relationships / poor social skills    Addictive Behavior:   Addictive Behavior  In the past 3 months, have you felt or has someone told you that you have a problem with:  : None  Do you have a history of Chemical Use?: No  Do you have a history of Alcohol Use?: No  Do you have a history of Street Drug Abuse?: No  Histroy of Prescripton Drug Abuse?: No    Medical Problems:   Past Medical History:   Diagnosis Date    Bipolar affective (HCC)     Cerebral palsy (HCC)     Chicken pox        Status EXAM:  Status and Exam  Normal: No  Facial Expression: Elevated, Exaggerated  Affect: Inappropriate  Level of Consciousness: Alert  Mood:Normal: No  Mood: Anxious  Motor Activity:Normal: No  Motor Activity: Increased  Interview Behavior: Impulsive  Preception: [place] to Person, [place] to Time, [place] to Place  Attention:Normal: No  Attention: Distractible  Thought Processes: Flt.of Ideas, Loose Assoc. Thought Content:Normal: No  Thought Content: Preoccupations  Hallucinations: None  Delusions: No  Memory:Normal: No  Memory: Poor Recent  Insight and Judgment: No  Insight and Judgment: Poor Judgment, Poor Insight  Present Suicidal Ideation: No  Present Homicidal Ideation: No    Pt admitted with followings belongings:  Dentures: None  Vision - Corrective Lenses: None  Hearing Aid: None  Jewelry: None  Body Piercings Removed: N/A  Clothing: Footwear, Jacket / coat, Pants, Shirt, Sweater, Other (Comment), Undergarments (Comment)(camo back pack, tobacco and rolling papers)  Were All Patient Medications Collected?: Not Applicable  Other Valuables: None     Admission order obtained yes. Valuables sent home with no.  Valuables placed in safe in security envelope, number:
Group Therapy Note    Date: 6/25/2020  Start Time:  1000  End Time:   1115  Number of Participants:  6    Type of Group: Recreational/Social    Patient's Goal:   Increase socialization and concentration. Notes:    Patient was social with others and demonstrated fair concentration while participating in a painting activity.      Status After Intervention:  Improved    Participation Level: Interactive    Participation Quality: Sharing      Speech:  hesitant      Thought Process/Content:   guarded      Affective Functioning: Congruent      Mood: anxious and euthymic      Level of consciousness:  Attentive      Response to Learning: Progressing to goal      Endings: None Reported    Modes of Intervention: Socialization and Activity      Discipline Responsible: Psychoeducational Specialist      Signature:  Chery Chacko
Group Therapy Note    Date: 6/25/2020  Start Time: 2000  End Time:  2020  Number of Participants: 1    Type of Group: Wrap-Up    Wellness Binder Information  Module Name:    Session Number:      Patient's Goal:  Find who she can trust    Notes: Working on goal    Status After Intervention:  Unchanged    Participation Level: Interactive    Participation Quality: Attentive      Speech:  pressured      Thought Process/Content: Delusional      Affective Functioning: Flat      Mood: anxious      Level of consciousness:  Alert      Response to Learning: Able to retain information      Endings: None Reported    Modes of Intervention: Education      Discipline Responsible: Registered Nurse      Signature:   Armani Torres RN
Group Therapy Note    Date: 6/27/2020  Start Time: 1500  End Time:  1147  Number of Participants: 5    Type of Group: Psychoeducation    Group Goal:  Increase insight into positive coping skills and negative coping skills. Notes:  Each patient was asked to give 2 things causing them stress today: \"People and not being my real self\"  Rate coping skills on #1-10 with 10 the best coping:  #7  And 2 positive coping skills:  \"music and meditation / prayer\"     Status After Intervention:  Improved    Participation Level:  Active Listener and Minimal, sleepy and drowsy (she fell asleep)    Participation Quality: Appropriate and Attentive      Speech:  normal      Thought Process/Content: Logical      Affective Functioning: Blunted, Flat and Constricted/Restricted      Mood: depressed      Level of consciousness:  Alert, Oriented x4 and Attentive      Response to Learning: Able to verbalize current knowledge/experience, Able to verbalize/acknowledge new learning, Able to retain information, Capable of insight and Able to change behavior      Endings: None Reported    Modes of Intervention: Education, Support and Socialization      Discipline Responsible: Registered Nurse      Signature:  ARTURO To RN MSN
Group Therapy Note    Date: 6/28/2020  Start Time: 2000  End Time:  2020  Number of Participants: 1    Type of Group: Wrap-Up    Wellness Binder Information  Module Name:    Session Number:      Patient's Goal:  To be discharged    Notes:  Discharge tomorrow    Status After Intervention:  Improved    Participation Level: Active Listener    Participation Quality: Attentive      Speech:  normal      Thought Process/Content: Linear      Affective Functioning: Incongruent      Mood: anxious      Level of consciousness:  Alert      Response to Learning: Able to verbalize current knowledge/experience      Endings: None Reported    Modes of Intervention: Education      Discipline Responsible: Registered Nurse      Signature:   Michel Guzmán RN
PLAN OF CARE:     Start Time: 0900  End Time:   0930    Group Topic:  Daily Goals    Group Type:   Goal Group    Intervention/Goal:  To increase support and identify daily goals    Attendance:  Attended group    Affect:  Brightens with interaction    Behavior:  Cooperative and pleasant    Response:   Identified a daily goal     Daily Goal:   Draw a picture. Take a shower.      Progress:  Progressing to goal
problems noted    COORDINATION:  No problems noted   MOBILITY:  Ambulates independently   GOALS:   Increase socialization by attending groups on the unit daily.

## 2020-06-29 NOTE — PROGRESS NOTES
This RN has reviewed and agrees with Amador Paniagua LPN's data collection and has collaborated with this LPN regarding the patient's care plan.

## 2020-06-29 NOTE — PLAN OF CARE
Problem: Discharge Planning:  Goal: Discharged to appropriate level of care  Description: Discharged to appropriate level of care  6/28/2020 2214 by Leron Bamberger, RN  Outcome: Ongoing  Note: To be discharged back to [facility] and follow with Smita  6/28/2020 1616 by Pam Gifford RN  Outcome: Ongoing  Note: Pt working with treatment team for optimal discharge needs     Problem: Altered Mood, Manic Behavior:  Goal: Able to sleep  Description: Able to sleep  6/28/2020 2214 by Leron Bamberger, RN  Outcome: Ongoing  Note: Pt resting quietly with no distressnoted  6/28/2020 1616 by Pam Gifford RN  Outcome: Ongoing  Note: Pt has poor sleep 7.5 hr broken sleep  Goal: Able to verbalize decrease in frequency and intensity of racing thoughts  Description: Able to verbalize decrease in frequency and intensity of racing thoughts  6/28/2020 2214 by Leron Bamberger, RN  Outcome: Ongoing  Note: Pt denies racing thoughts and able to concentrate  6/28/2020 1616 by Pam Gifford RN  Outcome: Met This Shift  Note: Report decrease of racing thoughts  Goal: Ability to achieve adequate nutritional intake will improve  Description: Ability to achieve adequate nutritional intake will improve  6/28/2020 2214 by Leron Bamberger, RN  Outcome: Ongoing  Note: Pt eating well and had 100% snack  6/28/2020 1616 by Pam Gifford RN  Outcome: Met This Shift  Note: Eating well and drinking fluids freely  Goal: Ability to interact with others will improve  Description: Ability to interact with others will improve  6/28/2020 2214 by Leron Bamberger, RN  Outcome: Ongoing  Note: Pt interacting well with peers and staff  6/28/2020 [id number] [phone] by Pam Gifford RN  Outcome: Ongoing  Note: At interacting with peers and attended group  Goal: Ability to demonstrate self-control will improve  Description: Ability to demonstrate self-control will improve  6/28/2020 2214 by Leron Bamberger, RN  Outcome: Ongoing  Note: Pt remains calm and cooperative  6/28/2020 1616 by Robe Cotto RN  Outcome: Ongoing  Goal: Mood stable  Description: Mood stable  6/28/2020 2214 by Adán Loja RN  Outcome: Ongoing  Note: Pt calm and cooperative  6/28/2020 1616 by Robe Cotto RN  Outcome: Ongoing  Note: Pt reports having a good mood just \"tired\"  Goal: Patient specific goal  Description: Patient specific goal  6/28/2020 2214 by Adán Loja RN  Outcome: Ongoing  Note: Pt meeting goal of being discharged tomorrow  6/28/2020 1616 by Robe Cotto RN  Outcome: Ongoing  Note: \"ask Dr when I can be discharged\"     Problem: Anxiety:  Goal: Level of anxiety will decrease  Description: Level of anxiety will decrease  6/28/2020 2214 by Adán Loja RN  Outcome: Ongoing  Note: Pt denies anxiety  6/28/2020 1616 by Robe Cotto RN  Outcome: Ongoing  Note: Pt shared with the loud and cussing comments from a male and female peer she was feeling anxius but when she went to lay down she began to feel less anxiety with exercising her deep breathing techniques   Care plan reviewed with patient and  verbalize understanding of the plan of care and contribute to goal setting.

## 2020-06-29 NOTE — PROGRESS NOTES
Discharge Planning-Radha is scheduled for a follow up appointment with Donal Morfin CNP on 06/30/2020 at 4:00 PM. Yaya Webber is scheduled for a follow up appointment with Adrianna Holstein, counseling, on 07/06/2020 at 9:00 AM.

## 2020-06-29 NOTE — GROUP NOTE
Group Therapy Note    Date: 6/29/2020    Group Start Time: 0900  Group End Time: 0930  Group Topic: [employer]    [facility]    Riana Firestone, [facility] Therapy Note    Attendees: 4         Patient's Goal:  To get affairs in order to go home today. Notes:  Pt is noted to be increasingly lethargic and fatigued throughout the group therapy session. At the start of group, pt was laying with her head down on the table reporting that she feels like she could fall asleep anywhere. Therapist asked pt if she feels so lethargic due to lack of sleep or medication. Pt reports that she feels that it is a blend of both. Pt is able to participate appropriately when given increased verbal prompting, but is noted to lay her head on the table and sleep on the table when other peers were talking. Status After Intervention:  Unchanged    Participation Level:  Active Listener and Minimal    Participation Quality: Appropriate, Attentive and Lethargic      Speech:  hesitant      Thought Process/Content: Linear      Affective Functioning: Flat      Mood: dysphoric      Level of consciousness:  Oriented x4, Attentive and Drowsy      Response to Learning: Able to retain information and Progressing to goal      Endings: None Reported    Modes of Intervention: Education, Support, Socialization, Exploration, Clarifying, Activity, Limit-setting and Reality-testing      Discipline Responsible: Psychoeducational Specialist      Signature:  Erika Ji

## 2020-06-29 NOTE — PROGRESS NOTES
CLINICAL PHARMACY NOTE: MEDS TO [address] Select Patient?: Yes  Total # of Prescriptions Filled: 3   The following medications were delivered to the patient:  Divalproex Sodium ER 250mg  Trazodone 100mg  Aripirazole 15mg  Total # of Interventions Completed: 2  Time Spent (min): 30    Additional Documentation:

## 2020-06-29 NOTE — PROGRESS NOTES
Behavioral Health   Discharge Note    Pt discharged with followings belongings:   Dentures: None  Vision - Corrective Lenses: None  Hearing Aid: None  Jewelry: None  Body Piercings Removed: N/A  Clothing: Footwear, Jacket / coat, Pants, Shirt, Sweater, Other (Comment), Undergarments (Comment)(camo back pack, tobacco and rolling papers)  Were All Patient Medications Collected?: Not Applicable  Other Valuables: None   Valuables sent home with patient. .  Patient left department with transport Discharged to [facility]. \"An Important Message from [employer] About Your Rights\" form photocopy original from admission and provided to pt at discharge N. Patient education on aftercare instructions: yes  Bridge Appointment completed: Reviewed Discharge Instructions with patient. Patient verbalizes understanding and agreement with the discharge plan using the teachback method. Patient verbalize understanding of AVS:  yes. Status EXAM upon discharge:  Status and Exam  Normal: No  Facial Expression: Flat, Brightened  Affect: Blunt  Level of Consciousness: Alert  Mood:Normal: Yes  Mood: Anxious  Motor Activity:Normal: Yes  Motor Activity: Increased  Interview Behavior: Cooperative  Preception: [place] to Person, [place] to Time, [place] to Place, [place] to Situation  Attention:Normal: No  Attention: Distractible  Thought Processes: Circumstantial  Thought Content:Normal: No  Thought Content: Preoccupations  Hallucinations: None  Delusions: No  Delusions:  Other(See Comment)(paranoia)  Memory:Normal: No  Memory: Poor Recent  Insight and Judgment: No  Insight and Judgment: Poor Judgment  Present Suicidal Ideation: No  Present Homicidal Ideation: No    Titi Macdonald LPN

## 2020-06-30 ENCOUNTER — TELEPHONE (OUTPATIENT)
Dept: PSYCHIATRY | Age: 30
End: 2020-06-30

## 2020-06-30 NOTE — DISCHARGE SUMMARY
Provider Discharge Summary     Patient ID:  Raul Jules  [id number]  89 y.o.  1990    Admit date: 6/24/2020    Discharge date and time: 6/29/2020  9:56 PM     Admitting Physician: Eric Horton MD     Discharge Physician: Eric Horton MD    Admission Diagnoses: Bipolar 1 disorder [facility]) [F31.9]    Discharge Diagnoses:      Schizoaffective disorder, bipolar type [facility])     Patient Active Problem List   Diagnosis Code    Other schizophrenia ([facility] F20.89    Flexural eczema L20.82    Toenail fungus B35.1    Schizoaffective disorder, bipolar type ([facility] F25.0        Admission Condition: poor    Discharged Condition: stable    Indication for Admission: threat to self    History of Present Illnes (present tense wording is of findings from admission exam and are not necessarily indicative of current findings):   Raul Jules is a 34 y.o. female with a history of schizoaffective disorder, bipolar type who presented to the emergency department with [facility] for psychosis. Per Noah Caballero from [facility], patient has had an increase in behaviors. Patient was walking back to her housing at [facility] holding hands with two gentlemen. When staff approached patient bolted from staff. Patient stated to staff at [address] [facility] that she made money from  [employer] herself. Jenna Candelaria had requested a drug screen. Patient returned the cup with urine spilled over with water. Per the ED, patients speech is rapid, pressured with tangential thought. Patient responds to questions unrelated to assessment.  Patient needs continual redirection however responses are inappriopriate .  When asking patient if she has taken her medication the past several days she responds with 'Viagra, thats what is in shots' When asked if she is eating and drinking ok, the patient stated \"well they are locking my food up so I can't eat, this is why I look like this\" When asked what the patient was doing when she went to [facility] she stated \" I went there to get my medication, they changed it last night and I can't take trazadone because I didn't sleep for 9 months\" \"my psychiatrist is 6years old\" \"I was going to the [place] to get a 20 dollar bill and go to the social security office and they changed those places to a hospital and now i'm here\" \" I dont know why im here, Im fine really\".       Adrian is a poor historian. She is not oriented to situation. She states she was admitted because \"yeah they changed my medication again. My psychiatrist tried to give me 3 different shots I didn't need. He gave me 2 at first in the muscle at [facility]. The shots make me restless. They make me want to fight someone, punch something, punch the door. Its taking hours to fall asleep with those shots. \" She is not sure what is in the shots. \"After I used the bathroom it is like straight Fireball whiskey. \" She reports following discharge from , Bianka Catalan changed her medications. Her speech is rapid, pressured and interrupting at times. She appears restless on examination. She exhibits flight of ideas, loose associations and tangentiality. She also laughs inappropriately at times. She states \"I have so much energy I could run through the walls. Could probably do anything I wanted to. \" She states she has been feeling very irritable. She feels her mind is racing a mile a minute like a hamster wheel. She states she has not been sleeping well at home prior to admission. She denies recent hallucinations.      She currently sees Bianka Catalan at [facility]. She is diagnosed with schizoaffective disorder, bipolar type. She currently takes Aristada BAZZI, Abilify PO, and Depakote. . Reports good medication compliance. Denies side effects. Denies past suicide attempts. Was admitted in [facility] to [facility] inpatient youth psychiatric unit. Denies other previous admissions. Denies substance abuse. UDS negative.   Hospital found. Please discuss with provider. Discharge Exam:  Level of consciousness:  Within normal limits  Appearance: Street clothes, seated, with good grooming  Behavior/Motor: No abnormalities noted  Attitude toward examiner:  Cooperative, attentive, good eye contact  Speech:  spontaneous, normal rate, normal volume and well articulated  Mood:  euthymic  Affect:  Full range  Thought processes:  linear, goal directed and coherent  Thought content:  denies homicidal ideation  Suicidal Ideation:  denies suicidal ideation  Delusions:  no evidence of delusions  Perceptual Disturbance:  denies any perceptual disturbance  Cognition:  Intact  Memory: age appropriate  Insight & Judgement: fair  Medication side effects: denies     Disposition: home    Patient Instructions: Activity: activity as tolerated  1. Patient instructed to take medications regularly and follow up with outpatient appointments. Follow-up as scheduled with Saud Allan       Signed:    Electronically signed by Angela Regan MD on 6/29/20 at 9:56 PM EDT    Time Spent on discharge is more than 35 minutes in the examination, evaluation, counseling and review of medications and discharge plan. Yani Barrett is a 34 y.o. female being evaluated by a Virtual Visit (video visit) encounter to address concerns as mentioned above. A caregiver was present in the room along with the patient. Pursuant to the emergency declaration under the [facility], [address] [place] [address] authority and the [employer] General Act, this Virtual Visit was conducted with patient's (and/or legal guardian's) consent, to reduce the patient's risk of exposure to COVID-19 and provide necessary medical care. Services were provided through a video synchronous discussion virtually to substitute for in-person visit by provider.    Patient is present at [address] [place] [address] on [address] and I am physically present at my home in [facility]     --Mary Ann Nazario MD on 6/29/2020 at 9:56 PM    An electronic signature was used to authenticate this note. **This report has been created using voice recognition software. It may contain minor errors which are inherent in voice recognition technology. **

## 2020-07-15 ENCOUNTER — HOSPITAL ENCOUNTER (OUTPATIENT)
Age: 30
Setting detail: SPECIMEN
Discharge: HOME | End: 2020-07-15
Payer: COMMERCIAL

## 2020-07-15 LAB
ALBUMIN SERPL-MCNC: 3.7 G/DL (ref 3.5–5.2)
ALBUMIN/GLOBULIN RATIO: 1.2 (ref 1–2.5)
ALP BLD-CCNC: 60 U/L (ref 35–104)
ALT SERPL-CCNC: 10 U/L (ref 5–33)
ANION GAP SERPL CALCULATED.3IONS-SCNC: 10 MMOL/L (ref 9–17)
AST SERPL-CCNC: 15 U/L
BILIRUB SERPL-MCNC: <0.1 MG/DL (ref 0.3–1.2)
BUN BLDV-MCNC: 8 MG/DL (ref 6–20)
BUN/CREAT BLD: ABNORMAL (ref 9–20)
CALCIUM SERPL-MCNC: 8.9 MG/DL (ref 8.6–10.4)
CHLORIDE BLD-SCNC: 105 MMOL/L (ref 98–107)
CO2: 24 MMOL/L (ref 20–31)
CREAT SERPL-MCNC: 0.59 MG/DL (ref 0.5–0.9)
GFR AFRICAN AMERICAN: >60 ML/MIN
GFR NON-AFRICAN AMERICAN: >60 ML/MIN
GFR SERPL CREATININE-BSD FRML MDRD: ABNORMAL ML/MIN/{1.73_M2}
GFR SERPL CREATININE-BSD FRML MDRD: ABNORMAL ML/MIN/{1.73_M2}
GLUCOSE BLD-MCNC: 88 MG/DL (ref 70–99)
HCT VFR BLD CALC: 37.5 % (ref 36.3–47.1)
HEMOGLOBIN: 11.6 G/DL (ref 11.9–15.1)
MCH RBC QN AUTO: 29.3 PG (ref 25.2–33.5)
MCHC RBC AUTO-ENTMCNC: 30.9 G/DL (ref 28.4–34.8)
MCV RBC AUTO: 94.7 FL (ref 82.6–102.9)
NRBC AUTOMATED: 0 PER 100 WBC
PDW BLD-RTO: 16.9 % (ref 11.8–14.4)
PLATELET # BLD: 278 K/UL (ref 138–453)
PMV BLD AUTO: 9.1 FL (ref 8.1–13.5)
POTASSIUM SERPL-SCNC: 4.2 MMOL/L (ref 3.7–5.3)
RBC # BLD: 3.96 M/UL (ref 3.95–5.11)
SODIUM BLD-SCNC: 139 MMOL/L (ref 135–144)
TOTAL PROTEIN: 6.7 G/DL (ref 6.4–8.3)
TSH SERPL DL<=0.05 MIU/L-ACNC: 1.11 MIU/L (ref 0.3–5)
VITAMIN D 25-HYDROXY: 34.7 NG/ML (ref 30–100)
WBC # BLD: 10.2 K/UL (ref 3.5–11.3)

## 2020-09-17 ENCOUNTER — HOSPITAL ENCOUNTER (EMERGENCY)
Age: 30
Discharge: HOME | End: 2020-09-17
Payer: COMMERCIAL

## 2020-09-17 VITALS
HEIGHT: 65 IN | BODY MASS INDEX: 25.83 KG/M2 | OXYGEN SATURATION: 97 % | TEMPERATURE: 98.1 F | DIASTOLIC BLOOD PRESSURE: 48 MMHG | WEIGHT: 155 LBS | SYSTOLIC BLOOD PRESSURE: 109 MMHG | HEART RATE: 75 BPM | RESPIRATION RATE: 18 BRPM

## 2020-09-17 LAB
ALBUMIN SERPL-MCNC: 3.5 G/DL (ref 3.5–5.1)
ALP BLD-CCNC: 52 U/L (ref 38–126)
ALT SERPL-CCNC: 18 U/L (ref 11–66)
AMPHETAMINE+METHAMPHETAMINE URINE SCREEN: NEGATIVE
ANION GAP SERPL CALCULATED.3IONS-SCNC: 9 MEQ/L (ref 8–16)
AST SERPL-CCNC: 16 U/L (ref 5–40)
BACTERIA: ABNORMAL /HPF
BARBITURATE QUANTITATIVE URINE: NEGATIVE
BASOPHILS # BLD: 0.4 %
BASOPHILS ABSOLUTE: 0 THOU/MM3 (ref 0–0.1)
BENZODIAZEPINE QUANTITATIVE URINE: NEGATIVE
BILIRUB SERPL-MCNC: < 0.2 MG/DL (ref 0.3–1.2)
BILIRUBIN URINE: NEGATIVE
BLOOD, URINE: NEGATIVE
BUN BLDV-MCNC: 14 MG/DL (ref 7–22)
CALCIUM SERPL-MCNC: 8.9 MG/DL (ref 8.5–10.5)
CANNABINOID QUANTITATIVE URINE: NEGATIVE
CASTS 2: ABNORMAL /LPF
CASTS UA: ABNORMAL /LPF
CHARACTER, URINE: CLEAR
CHLORIDE BLD-SCNC: 105 MEQ/L (ref 98–111)
CO2: 24 MEQ/L (ref 23–33)
COCAINE METABOLITE QUANTITATIVE URINE: NEGATIVE
COLOR: YELLOW
CREAT SERPL-MCNC: 0.7 MG/DL (ref 0.4–1.2)
CRYSTALS, UA: ABNORMAL
EOSINOPHIL # BLD: 1.8 %
EOSINOPHILS ABSOLUTE: 0.2 THOU/MM3 (ref 0–0.4)
EPITHELIAL CELLS, UA: ABNORMAL /HPF
ERYTHROCYTE [DISTWIDTH] IN BLOOD BY AUTOMATED COUNT: 14.1 % (ref 11.5–14.5)
ERYTHROCYTE [DISTWIDTH] IN BLOOD BY AUTOMATED COUNT: 50.3 FL (ref 35–45)
GFR SERPL CREATININE-BSD FRML MDRD: > 90 ML/MIN/1.73M2
GLUCOSE BLD-MCNC: 94 MG/DL (ref 70–108)
GLUCOSE URINE: NEGATIVE MG/DL
HCT VFR BLD CALC: 36.2 % (ref 37–47)
HEMOGLOBIN: 11.9 GM/DL (ref 12–16)
IMMATURE GRANS (ABS): 0.07 THOU/MM3 (ref 0–0.07)
IMMATURE GRANULOCYTES: 0.7 %
KETONES, URINE: NEGATIVE
LEUKOCYTE ESTERASE, URINE: ABNORMAL
LYMPHOCYTES # BLD: 34.5 %
LYMPHOCYTES ABSOLUTE: 3.3 THOU/MM3 (ref 1–4.8)
MCH RBC QN AUTO: 31.5 PG (ref 26–33)
MCHC RBC AUTO-ENTMCNC: 32.9 GM/DL (ref 32.2–35.5)
MCV RBC AUTO: 95.8 FL (ref 81–99)
MISCELLANEOUS 2: ABNORMAL
MONOCYTES # BLD: 7.9 %
MONOCYTES ABSOLUTE: 0.8 THOU/MM3 (ref 0.4–1.3)
NITRITE, URINE: POSITIVE
NUCLEATED RED BLOOD CELLS: 0 /100 WBC
OPIATES, URINE: NEGATIVE
OSMOLALITY CALCULATION: 275.9 MOSMOL/KG (ref 275–300)
OXYCODONE: NEGATIVE
PH UA: 6.5 (ref 5–9)
PHENCYCLIDINE QUANTITATIVE URINE: NEGATIVE
PLATELET # BLD: 231 THOU/MM3 (ref 130–400)
PMV BLD AUTO: 8.6 FL (ref 9.4–12.4)
POTASSIUM REFLEX MAGNESIUM: 4.3 MEQ/L (ref 3.5–5.2)
PREGNANCY, SERUM: NEGATIVE
PROTEIN UA: NEGATIVE
RBC # BLD: 3.78 MILL/MM3 (ref 4.2–5.4)
RBC URINE: ABNORMAL /HPF
RENAL EPITHELIAL, UA: ABNORMAL
SEG NEUTROPHILS: 54.7 %
SEGMENTED NEUTROPHILS ABSOLUTE COUNT: 5.2 THOU/MM3 (ref 1.8–7.7)
SODIUM BLD-SCNC: 138 MEQ/L (ref 135–145)
SPECIFIC GRAVITY, URINE: 1.02 (ref 1–1.03)
TOTAL PROTEIN: 6.6 G/DL (ref 6.1–8)
TSH SERPL DL<=0.05 MIU/L-ACNC: 1.2 UIU/ML (ref 0.4–4.2)
UROBILINOGEN, URINE: 0.2 EU/DL (ref 0–1)
WBC # BLD: 9.5 THOU/MM3 (ref 4.8–10.8)
WBC UA: ABNORMAL /HPF
YEAST: ABNORMAL

## 2020-09-17 PROCEDURE — 80307 DRUG TEST PRSMV CHEM ANLYZR: CPT

## 2020-09-17 PROCEDURE — 81001 URINALYSIS AUTO W/SCOPE: CPT

## 2020-09-17 PROCEDURE — 87186 SC STD MICRODIL/AGAR DIL: CPT

## 2020-09-17 PROCEDURE — 84703 CHORIONIC GONADOTROPIN ASSAY: CPT

## 2020-09-17 PROCEDURE — 84443 ASSAY THYROID STIM HORMONE: CPT

## 2020-09-17 PROCEDURE — 80053 COMPREHEN METABOLIC PANEL: CPT

## 2020-09-17 PROCEDURE — 99284 EMERGENCY DEPT VISIT MOD MDM: CPT

## 2020-09-17 PROCEDURE — 96374 THER/PROPH/DIAG INJ IV PUSH: CPT

## 2020-09-17 PROCEDURE — 87086 URINE CULTURE/COLONY COUNT: CPT

## 2020-09-17 PROCEDURE — 87077 CULTURE AEROBIC IDENTIFY: CPT

## 2020-09-17 PROCEDURE — 85025 COMPLETE CBC W/AUTO DIFF WBC: CPT

## 2020-09-17 PROCEDURE — 36415 COLL VENOUS BLD VENIPUNCTURE: CPT

## 2020-09-17 PROCEDURE — 2580000003 HC RX 258: Performed by: PHYSICIAN ASSISTANT

## 2020-09-17 PROCEDURE — 6360000002 HC RX W HCPCS: Performed by: PHYSICIAN ASSISTANT

## 2020-09-17 PROCEDURE — 96361 HYDRATE IV INFUSION ADD-ON: CPT

## 2020-09-17 RX ORDER — NITROFURANTOIN 25; 75 MG/1; MG/1
100 CAPSULE ORAL 2 TIMES DAILY
Qty: 10 CAPSULE | Refills: 0 | Status: SHIPPED | OUTPATIENT
Start: 2020-09-17 | End: 2020-09-22

## 2020-09-17 RX ORDER — 0.9 % SODIUM CHLORIDE 0.9 %
1000 INTRAVENOUS SOLUTION INTRAVENOUS ONCE
Status: COMPLETED | OUTPATIENT
Start: 2020-09-17 | End: 2020-09-17

## 2020-09-17 RX ORDER — KETOROLAC TROMETHAMINE 30 MG/ML
30 INJECTION, SOLUTION INTRAMUSCULAR; INTRAVENOUS ONCE
Status: COMPLETED | OUTPATIENT
Start: 2020-09-17 | End: 2020-09-17

## 2020-09-17 RX ADMIN — SODIUM CHLORIDE 1000 ML: 9 INJECTION, SOLUTION INTRAVENOUS at 21:10

## 2020-09-17 RX ADMIN — KETOROLAC TROMETHAMINE 30 MG: 30 INJECTION, SOLUTION INTRAMUSCULAR at 21:12

## 2020-09-17 ASSESSMENT — PAIN DESCRIPTION - LOCATION: LOCATION: HEAD

## 2020-09-17 ASSESSMENT — PAIN SCALES - GENERAL
PAINLEVEL_OUTOF10: 6
PAINLEVEL_OUTOF10: 6
PAINLEVEL_OUTOF10: 3

## 2020-09-17 ASSESSMENT — SLEEP AND FATIGUE QUESTIONNAIRES
DO YOU HAVE DIFFICULTY SLEEPING: YES
DIFFICULTY STAYING ASLEEP: YES
DIFFICULTY ARISING: NO
RESTFUL SLEEP: YES
DIFFICULTY FALLING ASLEEP: YES
DO YOU USE A SLEEP AID: NO
SLEEP PATTERN: DIFFICULTY FALLING ASLEEP;NIGHTMARES/TERRORS
AVERAGE NUMBER OF SLEEP HOURS: 6

## 2020-09-17 ASSESSMENT — PATIENT HEALTH QUESTIONNAIRE - PHQ9: SUM OF ALL RESPONSES TO PHQ QUESTIONS 1-9: 18

## 2020-09-18 ASSESSMENT — ENCOUNTER SYMPTOMS
COLOR CHANGE: 0
ABDOMINAL PAIN: 0
SHORTNESS OF BREATH: 0
SORE THROAT: 0

## 2020-09-18 NOTE — ED NOTES
Lab at bedside to draw pt. Pt IV inserted and pt tolerated. Pt Vs reassessed and medicated per MAR. Pt updated on POC. Pt verbalized understanding.  Will continue to monitor      Soledad Mays RN  09/17/20 2121

## 2020-09-18 NOTE — ED NOTES
Pt resting in bed with eyes closed and RR reg. Pt woke up to verbal stimuli. Pt vs reassessed. RR reg. Alison BLACK at bedside to update pt on POC.  Will continue to monitor      Arian Underwood RN  09/17/20 9131

## 2020-09-18 NOTE — ED NOTES
Pt urinated for sample and back in bed. VS reassessed. And RR reg. PT given something to eat.  Will continue to monitor      Katerine Robison, CHRISTEN  09/17/20 3508

## 2020-09-18 NOTE — PROGRESS NOTES
Provisional Diagnosis:   Bipolar 1, Schizophrenia, Schizoaffective disorder bipolar type and PTSD     Risk, Psychosocial and Contextual Factors:  Night terrors    Current MH Treatment: [facility]      Present Suicidal Behavior:      Verbal: Denies         Attempt: Denies    Access to Weapons: Denies     Current Suicide Risk: Low, Moderate or High:  Low      Past Suicidal Behavior:       Verbal:Denies    Attempt:Denies    Self-Injurious/Self-Mutilation: Denies    Traumatic Event Within Past 2 Weeks:   Denies    Current Abuse: Denies    Legal: Denies    Violence: Denies    Protective Factors:  Boyfriend    Housing: Resides in [address]       CPAP/Oxygen/Ambulation Difficulties: Denies    Basic Vital Signs Normal?: Check with Patients Nurse prior to Calling Psychiatry    Critical Labs?: Check with Patients Nurse prior to Calling Psychiatry    Clinical Summary:      Patient is a 27year old female who presents to the ED voluntarily reporting she has been having night terrors and she is scaring her roommate. Pt reports the night terrors have been going on for awhile now but she and her roommate is scared if they have night terrors at the same time. Pt reports she is medication compliant, she is residing at a [address] [place] [address] and sees her therapist Goldie Bird. Pt reports her peer support person resigned so she don't have one currently. Pt denies having any suicidal ideations. Pt reports she is seeing a bird daily that is has been genetically altered. Pt reports this bird has one wing that is of a sparrow and one of a blue zuleyma, head of a cardinal, belly of a jermaine and a tail of a hawk. Homicidal thoughts and/or plans denied. No delusions noted. AOD denied. Level of Care Disposition:      Consulted with Carlos A Delaney. Patient is medically clear  Consulted with Dr. Fran Haas.  Patient to be to be discharged and follow up with [facility]

## 2020-09-18 NOTE — ED PROVIDER NOTES
[facility]  eMERGENCY dEPARTMENT eNCOUnter          CHIEF COMPLAINT       Chief Complaint   Patient presents with    Depression    Other     night terrors       Nurses Notes reviewed and I agree except as noted inthe HPI. HISTORY OF PRESENT ILLNESS    Frandy Desai is a 27 y.o. female who presents to the Emergency Department for the evaluation of depression and nightmares. Patient states that she has a history of PTSD, diagnosed as a child due to childhood traumas and did have episodes of nightmares prior to being medicated as a teenager and states she has been pretty well controlled since her teens. However, 2 weeks ago she began having declining mental health and has been told from her roommates that she is waking at night screaming and banging on the door. Patient has no memory of these. She does report multiple increased stressors recently including problems with her boyfriend who will talk to her, change in her  who she has difficulty getting in contact with, for return of her mother in her life who blames the patient's past on the patient as well as intrusive thoughts regarding an ax that occur annually due to a past trauma. Patient states she does see a counselor but has not yet had time to discuss her prior traumas as they are currently working on her present issues. She states that 1 month ago her Geodon was changed to Depakote and she believes her respite all may have been discontinued as well. She states she is on a new injection but is unsure what this is and is had a constant headache since she started the injection. She follows with [facility] for mental health concerns and states that she mentioned the medication changes at her last appointment but because she could not say specifically how she felt worse, no medication adjustment was made. She has a follow-up appointment in December.   She states that she occasionally hears a voice calling her name but when she looks, no one will be there. She otherwise denies any other hallucinations, suicidal ideation or homicidal ideation. She states she did use alcohol and marijuana 1 month ago but has been clean since. She reports some chronic pain in her left ankle due to cerebral palsy and fracture as a child. She notes persistent headache for the past month since the injection and states she has tried ibuprofen and \"everything \"over-the-counter which provide only temporary improvement in the headache. No new vision changes, confusion, numbness, weakness, seizure activity, fever, neck stiffness and no head injury. The HPI was provided by the patient. REVIEW OF SYSTEMS     Review of Systems   Constitutional: Negative for chills and fever. HENT: Negative for sore throat. Eyes: Negative for visual disturbance. Respiratory: Negative for shortness of breath. Cardiovascular: Negative for chest pain. Gastrointestinal: Negative for abdominal pain. Musculoskeletal: Positive for arthralgias. Skin: Negative for color change. Neurological: Positive for headaches. Negative for weakness. Psychiatric/Behavioral: Positive for hallucinations and sleep disturbance. Negative for self-injury and suicidal ideas. The patient is nervous/anxious. PAST MEDICAL HISTORY    has a past medical history of Bipolar affective ([facility] Cerebral palsy ([facility] and Chicken pox. SURGICAL HISTORY      has a past surgical history that includes Arm Surgery.     CURRENT MEDICATIONS       Discharge Medication List as of 9/17/2020 10:48 PM      CONTINUE these medications which have NOT CHANGED    Details   !! divalproex (DEPAKOTE ER) 250 MG extended release tablet Take 5 tablets by mouth nightly, Disp-150 tablet, R-0Normal      ARIPiprazole (ABILIFY) 30 MG tablet Take 1 tablet by mouth daily, Disp-30 tablet, R-0Normal      !! divalproex (DEPAKOTE ER) 500 MG extended release tablet Take 1 tablet by mouth daily, Disp-30 tablet, R-0Normal      melatonin 5 MG TABS tablet Take 5 mg by mouth nightly as needed 1-2  Hs prnHistorical Med      ARIPiprazole lauroxil (ARISTADA) 882 MG/3.2ML PRSY injection Inject 882 mg into the muscle every 28 days Last 2020Historical Med       !! - Potential duplicate medications found. Please discuss with provider. ALLERGIES     is allergic to invega [paliperidone er]. FAMILY HISTORY     She indicated that her mother is alive. She indicated that her father is alive. She indicated that her paternal grandmother is . She indicated that her paternal grandfather is . family history includes Bipolar Disorder in her mother. SOCIAL HISTORY      reports that she has been smoking cigarettes. She has a 7.50 pack-year smoking history. She has never used smokeless tobacco. She reports that she does not drink alcohol or use drugs. PHYSICAL EXAM     INITIAL VITALS:  height is 5' 5\" (1.651 m) and weight is 155 lb (70.3 kg). Her oral temperature is 98.1 °F (36.7 °C). Her blood pressure is 109/48 (abnormal) and her pulse is 75. Her respiration is 18 and oxygen saturation is 97%. Physical Exam  Vitals signs and nursing note reviewed. Constitutional:       Appearance: Normal appearance. HENT:      Head: Normocephalic and atraumatic. Eyes:      Conjunctiva/sclera: Conjunctivae normal.   Cardiovascular:      Rate and Rhythm: Normal rate. Pulmonary:      Effort: Pulmonary effort is normal. No respiratory distress. Skin:     General: Skin is warm and dry. Neurological:      General: No focal deficit present. Mental Status: She is alert and oriented to person, place, and time.    Psychiatric:         Mood and Affect: Mood normal.         Behavior: Behavior normal.         DIFFERENTIAL DIAGNOSIS:   Differential diagnoses are discussed    DIAGNOSTIC RESULTS     EKG: All EKG's are interpreted by the Emergency Department Physician who either signs or Co-signsthis chart in the absence of a cardiologist.          RADIOLOGY: non-plain film images(s) such as CT, Ultrasound and MRI are read by the radiologist.    No orders to display       LABS:      Labs Reviewed   CBC WITH AUTO DIFFERENTIAL - Abnormal; Notable for the following components:       Result Value    RBC 3.78 (*)     Hemoglobin 11.9 (*)     Hematocrit 36.2 (*)     RDW-SD 50.3 (*)     MPV 8.6 (*)     All other components within normal limits   COMPREHENSIVE METABOLIC PANEL W/ REFLEX TO MG FOR LOW K - Abnormal; Notable for the following components: Total Bilirubin <0.2 (*)     All other components within normal limits   URINE WITH REFLEXED MICRO - Abnormal; Notable for the following components:    Nitrite, Urine POSITIVE (*)     Leukocyte Esterase, Urine SMALL (*)     All other components within normal limits   CULTURE, REFLEXED, URINE    Narrative:     Source: urine, clean catch       Site: clean void          Current Antibiotics: none   HCG, SERUM, QUALITATIVE   TSH WITH REFLEX   URINE DRUG SCREEN   ANION GAP   GLOMERULAR FILTRATION RATE, ESTIMATED   OSMOLALITY       EMERGENCY DEPARTMENT COURSE:   Vitals:    Vitals:    09/17/20 2009 09/17/20 2109 09/17/20 2148 09/17/20 2244   BP: 112/73 (!) 105/51 (!) 107/57 (!) 109/48   Pulse: 76 61 60 75   Resp: 18 18 18 18   Temp: 98.1 °F (36.7 °C)      TempSrc: Oral      SpO2: 96% 98% 98% 97%   Weight:       Height:          12:01 AM EDT: The patient was seen and evaluated. Patient presents for complaints of depression and night terrors. She had recent medication changes as well as multiple increased stressors. She has history of PTSD and had nightmares associated with this. She does complain of headache since a recent mental health medication was started 1 month ago. Headache resolved after treatment with Toradol in the department. Laboratory results were overall reassuring other than noted urinary tract infection for which she will be started on Macrobid.   She was evaluated by behavioral

## 2020-09-18 NOTE — ED TRIAGE NOTES
Pt comes to ED from home with c/o having night terrors in the middle of the night for the past 2 weeks. Pt states she would get up in the middle of the night and scream and hit doors. Pt states she does not not remember doing this but her room mates states that she is doing it. Pt states she has been stressed and depressed also for about one and a half months. Pt states her boyfriend recently moved out and trying to to get SSDI for the past month and that is stressing her out. Pt denies being suicidal and homicidal today. Pt states she just wants to figure out her night terrors. Pt states at the end of august she was taken off of trazodone and put on Depakote. Pt states she has has a lot of stressors recently. Pt states she also has a migraine that has been going on for 1.5 weeks.

## 2020-09-19 LAB
ORGANISM: ABNORMAL
URINE CULTURE REFLEX: ABNORMAL

## 2021-08-31 ENCOUNTER — APPOINTMENT (OUTPATIENT)
Dept: GENERAL RADIOLOGY | Age: 31
End: 2021-08-31
Payer: COMMERCIAL

## 2021-08-31 ENCOUNTER — HOSPITAL ENCOUNTER (EMERGENCY)
Age: 31
Discharge: HOME | End: 2021-08-31
Payer: COMMERCIAL

## 2021-08-31 VITALS
HEART RATE: 79 BPM | OXYGEN SATURATION: 98 % | TEMPERATURE: 98 F | BODY MASS INDEX: 35.82 KG/M2 | RESPIRATION RATE: 18 BRPM | HEIGHT: 65 IN | SYSTOLIC BLOOD PRESSURE: 121 MMHG | WEIGHT: 215 LBS | DIASTOLIC BLOOD PRESSURE: 86 MMHG

## 2021-08-31 DIAGNOSIS — S61.209A AVULSION OF FINGER, INITIAL ENCOUNTER: Primary | ICD-10-CM

## 2021-08-31 PROCEDURE — 99282 EMERGENCY DEPT VISIT SF MDM: CPT

## 2021-08-31 PROCEDURE — 73130 X-RAY EXAM OF HAND: CPT

## 2021-08-31 ASSESSMENT — ENCOUNTER SYMPTOMS
CHEST TIGHTNESS: 0
ABDOMINAL PAIN: 0
SHORTNESS OF BREATH: 0
COLOR CHANGE: 1

## 2021-08-31 ASSESSMENT — PAIN SCALES - GENERAL: PAINLEVEL_OUTOF10: 4

## 2021-09-01 NOTE — ED PROVIDER NOTES
[address]       Chief Complaint   Patient presents with    Hand Pain     LT RING FINGER       Nurses Notes reviewed and I agree except as noted in the HPI. HISTORY OF PRESENT ILLNESS    Maira Zhao is a 32 y.o. female who presents to the ED for evaluation of finger pain X a couple days. Pt states that a few days ago she was doing laundry and tripped with her laundry basket in hand. Since then she has had pain in her 4th finger on her right hand. She describes the pain as an ache, worse with movement and palpation. She also has some associated ecchymosis and swelling of her finger. She denies numbness, tingling, loss of sensation, and pain elsewhere. Her biggest concern is making sure her finger isn't broken. HPI was provided by the patient    REVIEW OF SYSTEMS     Review of Systems   Constitutional: Negative for chills, fatigue and fever. HENT: Negative for congestion, ear discharge, ear pain, postnasal drip and rhinorrhea. Eyes: Negative for redness. Respiratory: Negative for cough, chest tightness and shortness of breath. Cardiovascular: Negative for chest pain and leg swelling. Gastrointestinal: Negative for abdominal pain, nausea and vomiting. Genitourinary: Negative for difficulty urinating, dysuria, enuresis, flank pain and hematuria. Musculoskeletal: Positive for joint swelling. Negative for back pain. Skin: Positive for color change. Negative for pallor, rash and wound. Neurological: Negative for dizziness, weakness, light-headedness, numbness and headaches. Psychiatric/Behavioral: Negative for agitation, behavioral problems and confusion. All other systems negative except as noted. PAST MEDICAL HISTORY     Past Medical History:   Diagnosis Date    [facility] affective ([facility]     Cerebral palsy ([facility]     Chicken pox        SURGICALHISTORY      has a past surgical history that includes Arm Surgery.     CURRENT MEDICATIONS Discharge Medication List as of 2021  8:54 PM      CONTINUE these medications which have NOT CHANGED    Details   !! divalproex (DEPAKOTE ER) 250 MG extended release tablet Take 5 tablets by mouth nightly, Disp-150 tablet, R-0Normal      ARIPiprazole (ABILIFY) 30 MG tablet Take 1 tablet by mouth daily, Disp-30 tablet, R-0Normal      !! divalproex (DEPAKOTE ER) 500 MG extended release tablet Take 1 tablet by mouth daily, Disp-30 tablet, R-0Normal      melatonin 5 MG TABS tablet Take 5 mg by mouth nightly as needed 1-2  Hs prnHistorical Med      ARIPiprazole lauroxil (ARISTADA) 882 MG/3.2ML PRSY injection Inject 882 mg into the muscle every 28 days Last 2020Historical Med       !! - Potential duplicate medications found. Please discuss with provider. ALLERGIES     is allergic to invega [paliperidone er]. FAMILY HISTORY     She indicated that her mother is alive. She indicated that her father is alive. She indicated that her paternal grandmother is . She indicated that her paternal grandfather is . family history includes Bipolar Disorder in her mother.     SOCIAL HISTORY       Social History     Socioeconomic History    Marital status: Single     Spouse name: Not on file    Number of children: 1    Years of education: 15    Highest education level: Not on file   Occupational History    Not on file   Tobacco Use    Smoking status: Current Every Day Smoker     Packs/day: 0.50     Years: 15.00     Pack years: 7.50     Types: Cigarettes     Last attempt to quit: 2019     Years since quittin.3    Smokeless tobacco: Never Used   Vaping Use    Vaping Use: Never used   Substance and Sexual Activity    Alcohol use: No    Drug use: No    Sexual activity: Never   Other Topics Concern    Not on file   Social History Narrative    Not on file     Social Determinants of Health     Financial Resource Strain:     Difficulty of Paying Living Expenses:    Food Insecurity:     Worried About Running Out of Food in the Last Year:     [address] in the Last Year:    Transportation Needs:     Lack of Transportation (Medical):  Lack of Transportation (Non-Medical):    Physical Activity:     Days of Exercise per Week:     Minutes of Exercise per Session:    Stress:     Feeling of Stress :    Social Connections:     Frequency of Communication with Friends and Family:     Frequency of Social Gatherings with Friends and Family:     Attends [employer]ars[employer] Services:     Active Member of Clubs or Organizations:     Attends Club or Organization Meetings:     Marital Status:    Intimate Partner Violence:     Fear of Current or Ex-Partner:     Emotionally Abused:     Physically Abused:     Sexually Abused:        PHYSICAL EXAM     INITIAL VITALS:  height is 5' 5\" (1.651 m) and weight is 215 lb (97.5 kg). Her oral temperature is 98 °F (36.7 °C). Her blood pressure is 121/86 and her pulse is 79. Her respiration is 18 and oxygen saturation is 98%. Physical Exam  Constitutional:       General: She is awake. She is not in acute distress. Appearance: Normal appearance. She is well-developed. She is not ill-appearing, toxic-appearing or diaphoretic. HENT:      Head: Normocephalic and atraumatic. Nose: Nose normal.      Mouth/Throat:      Mouth: Mucous membranes are moist.      Pharynx: Oropharynx is clear. Eyes:      Conjunctiva/sclera: Conjunctivae normal.   Cardiovascular:      Rate and Rhythm: Normal rate and regular rhythm. Pulses: Normal pulses. Radial pulses are 2+ on the right side and 2+ on the left side. Heart sounds: No murmur heard. No friction rub. No gallop. Pulmonary:      Effort: Pulmonary effort is normal. No tachypnea or bradypnea. Breath sounds: No stridor. No wheezing or rales. Musculoskeletal:         General: No deformity. Normal range of motion.       Right hand: Swelling, tenderness and bony tenderness present. No deformity or lacerations. Normal range of motion. Normal sensation. Normal pulse. Left hand: No swelling, deformity, lacerations, tenderness or bony tenderness. Normal range of motion. Normal sensation. Normal pulse. Cervical back: Normal range of motion. Comments: Ecchymosis and tenderness to palpation of 4th digit of L hand   Skin:     General: Skin is warm and dry. Capillary Refill: Capillary refill takes less than 2 seconds. Findings: Bruising present. Neurological:      General: No focal deficit present. Mental Status: She is alert and oriented to person, place, and time. Psychiatric:         Mood and Affect: Mood normal.         Behavior: Behavior normal. Behavior is cooperative. DIFFERENTIAL DIAGNOSIS:   digit distal phalanx fracture, bruising secondary to trauma, strain    DIAGNOSTIC RESULTS     EKG: All EKG's are interpreted by the Emergency Department Physician who eithersigns or Co-signs this chart in the absence of a cardiologist.        RADIOLOGY: non-plainfilm images(s) such as CT, Ultrasound and MRI are read by the radiologist.  Plain radiographic images are visualized and preliminarily interpreted by the emergency physician unless otherwise stated below. XR HAND LEFT (MIN 3 VIEWS)   Final Result   1. Mild soft tissue swelling. 2. Questionable tiny chip fracture involving the head middle phalanx ring finger. Questionable tiny chip fracture base distal phalanx ring finger. Clinical correlation recommended. .            **This report has been created using voice recognition software. It may contain minor errors which are inherent in voice recognition technology. **      Final report electronically signed by Dr. Jonel Diamond on 8/31/2021 7:32 PM            LABS:   Labs Reviewed - No data to display    EMERGENCY DEPARTMENT COURSE:   Vitals:    Vitals:    08/31/21 1902   BP: 121/86   Pulse: 79   Resp: 18   Temp: 98 °F (36.7 °C)   TempSrc: Oral SpO2: 98%   Weight: 215 lb (97.5 kg)   Height: 5' 5\" (1.651 m)         [facility]    Patient was seen in the ER for an injury to the right fourth digit. There is bruising at the distal end. Imaging shows a questionable avulsion fracture that is very minute. Fingers are janet taped together for comfort. Tylenol and ibuprofen and follow up with pcp. Medications - No data to display      Patient was seen independently by myself. The patient's final impression and disposition and plan was determined by myself. Strict return precautions and follow up instructions were discussed with the patient prior to discharge, with which the patient agrees. Physical assessment findings, diagnostic testing(s) if applicable, and vital signs reviewed with patient/patient representative. Questions answered. Medications asdirected, including OTC medications for supportive care. Education provided on medications. Differential diagnosis(s) discussed with patient/patient representative. Home care/self care instructions reviewed withpatient/patient representative. Patient is to follow-up with family care provider in 2-3 days if no improvement. Patient is to go to the emergency department if symptoms worsen. Patient/patient representative isaware of care plan, questions answered, verbalizes understanding and is in agreement. CRITICAL CARE:   None    CONSULTS:  None    PROCEDURES:  None    FINAL IMPRESSION     1.  Avulsion of finger, initial encounter          DISPOSITION/PLAN   DISPOSITION Decision To Discharge 08/31/2021 08:51:17 PM      PATIENT REFERREDTO:  Fredia Prudence, APRN - CNP  [address]    Schedule an appointment as soon as possible for a visit in 2 days  For follow up      DISCHARGE MEDICATIONS:  Discharge Medication List as of 8/31/2021  8:54 PM          (Please note that portions of this note were completed with a voice recognition program.  Efforts were made to edit the dictations but occasionally words are mis-transcribed.)         CAMMIE Davidson CNP, APRN - CNP  09/02/21 [id number]

## 2021-09-02 ASSESSMENT — ENCOUNTER SYMPTOMS
NAUSEA: 0
BACK PAIN: 0
EYE REDNESS: 0
COUGH: 0
RHINORRHEA: 0
VOMITING: 0

## 2022-04-06 ENCOUNTER — APPOINTMENT (OUTPATIENT)
Dept: GENERAL RADIOLOGY | Age: 32
End: 2022-04-06
Payer: COMMERCIAL

## 2022-04-06 ENCOUNTER — HOSPITAL ENCOUNTER (EMERGENCY)
Age: 32
Discharge: HOME | End: 2022-04-06
Attending: EMERGENCY MEDICINE
Payer: COMMERCIAL

## 2022-04-06 VITALS
WEIGHT: 200 LBS | BODY MASS INDEX: 32.14 KG/M2 | HEIGHT: 66 IN | DIASTOLIC BLOOD PRESSURE: 62 MMHG | OXYGEN SATURATION: 95 % | RESPIRATION RATE: 15 BRPM | HEART RATE: 75 BPM | TEMPERATURE: 98.2 F | SYSTOLIC BLOOD PRESSURE: 118 MMHG

## 2022-04-06 DIAGNOSIS — J06.9 URI WITH COUGH AND CONGESTION: Primary | ICD-10-CM

## 2022-04-06 LAB
INFLUENZA A: NOT DETECTED
INFLUENZA B: NOT DETECTED
SARS-COV-2 RNA, RT PCR: NOT DETECTED
SOURCE: NORMAL
SPECIMEN DESCRIPTION: NORMAL

## 2022-04-06 PROCEDURE — 99285 EMERGENCY DEPT VISIT HI MDM: CPT

## 2022-04-06 PROCEDURE — 87636 SARSCOV2 & INF A&B AMP PRB: CPT

## 2022-04-06 PROCEDURE — 71045 X-RAY EXAM CHEST 1 VIEW: CPT

## 2022-04-06 RX ORDER — BENZONATATE 100 MG/1
100 CAPSULE ORAL 3 TIMES DAILY PRN
Qty: 20 CAPSULE | Refills: 0 | Status: SHIPPED | OUTPATIENT
Start: 2022-04-06

## 2022-04-06 RX ORDER — DOXYCYCLINE HYCLATE 100 MG
100 TABLET ORAL 2 TIMES DAILY
Qty: 20 TABLET | Refills: 0 | Status: SHIPPED | OUTPATIENT
Start: 2022-04-06 | End: 2022-04-16

## 2022-04-06 NOTE — ED PROVIDER NOTES
eMERGENCY dEPARTMENT eNCOUnter   Independent Attestation     Pt Name: Hayley Moya  MRN: [id number]  [place] 1990  Date of evaluation: 4/6/22     Hayley Moya is a 32 y.o. female with CC: Cough and Nasal Congestion      Based on the medical record the care appears appropriate. I was personally available for consultation in the Emergency Department. The care is provided during an unprecedented national emergency due to the novel coronavirus, COVID 19.     [facility],   Attending Emergency Physician                  [facility],   04/06/22 9477

## 2022-04-06 NOTE — Clinical Note
Darvin Pablo was seen and treated in our emergency department on 4/6/2022. She may return to work on 04/08/2022. Covid negative. If you have any questions or concerns, please don't hesitate to call.       Pb Crespo PA-C

## 2022-04-06 NOTE — ED PROVIDER NOTES
[address] ED  eMERGENCY dEPARTMENT eNCOUnter      Pt Name: Oren Anand  MRN: [id number]  [place] 1990  Date of evaluation: 4/6/2022  Provider: Melva Burch PA-C    CHIEF COMPLAINT       Chief Complaint   Patient presents with    Cough    Nasal Congestion           HISTORY OF PRESENT ILLNESS  (Location/Symptom, Timing/Onset, Context/Setting, Quality, Duration, Modifying Factors, Severity.)   Oren Anand is a 32 y.o. female who presents to the emergency department by ration of productive cough, shortness of breath, nasal congestion, headache, diarrhea x1 month. Patient denies fever, chills, ear pain, sore throat, chest pain, nausea, vomiting, abdominal pain. Patient was evaluated by her PCP and started on amoxicillin. Patient states she has completed the antibiotics. Reports her symptoms seem to be getting worse. She does smoke. Denies any other complaints. Nursing Notes were reviewed. REVIEW OF SYSTEMS    (2-9 systems for level 4, 10 or more for level 5)     Review of Systems   Cough, shortness of breath, nasal congestion, headache, diarrhea    Except as noted above the remainder of the review of systems was reviewed and negative.        PAST MEDICAL HISTORY     Past Medical History:   Diagnosis Date    Bipolar affective ([facility]     Cerebral palsy ([facility] [facility]     Chicken pox      None otherwise stated in nurses notes    SURGICAL HISTORY       Past Surgical History:   Procedure Laterality Date    ARM SURGERY       None otherwise stated in nurses notes    CURRENT MEDICATIONS       Discharge Medication List as of 4/6/2022  1:53 PM      CONTINUE these medications which have NOT CHANGED    Details   !! divalproex (DEPAKOTE ER) 250 MG extended release tablet Take 5 tablets by mouth nightly, Disp-150 tablet, R-0Normal      ARIPiprazole (ABILIFY) 30 MG tablet Take 1 tablet by mouth daily, Disp-30 tablet, R-0Normal      !! divalproex (DEPAKOTE ER) 500 MG extended release tablet Take 1 tablet by mouth daily, Disp-30 tablet, R-0Normal      melatonin 5 MG TABS tablet Take 5 mg by mouth nightly as needed 1-2  Hs prnHistorical Med      ARIPiprazole lauroxil (ARISTADA) 882 MG/3.2ML PRSY injection Inject 882 mg into the muscle every 28 days Last 2020Historical Med       !! - Potential duplicate medications found. Please discuss with provider. ALLERGIES     Invega [paliperidone er]    FAMILY HISTORY           Problem Relation Age of Onset    Bipolar Disorder Mother      Family Status   Relation Name Status    Mother  Alive    PGM      PGF      Father  Alive      None otherwise stated in nurses notes    SOCIAL HISTORY      reports that she has been smoking cigarettes. She has a 7.50 pack-year smoking history. She has never used smokeless tobacco. She reports that she does not drink alcohol and does not use drugs. lives at home with others     PHYSICAL EXAM    (up to 7 for level 4, 8 or more for level 5)     ED Triage Vitals [22 1255]   BP Temp Temp src Pulse Resp SpO2 Height Weight   118/62 98.2 °F (36.8 °C) -- 75 15 95 % 5' 5.5\" (1.664 m) 200 lb (90.7 kg)       Physical Exam   Nursing note and vitals reviewed. Constitutional: Oriented to person, place, and time and well-developed, well-nourished. Head: Normocephalic and atraumatic. Ear: External ears normal.  Cerumen impaction bilateral ears. Nose: Nose normal and midline. Eyes: Conjunctivae and EOM are normal. Pupils are equal, round, and reactive to light. Neck: Normal range of motion. Neck supple. Throat: Posterior pharynx is without erythema or exudates, airway is patent, no swelling  Cardiovascular: Normal rate, regular rhythm, normal heart sounds and intact distal pulses. Pulmonary/Chest: Effort normal and breath sounds normal. No respiratory distress. No wheezes. No rales. No rhonchi. No chest tenderness. productive cough on exam.  Abdominal: Soft. No distension and no mass.  There is no tenderness. There is no rebound and no guarding. Musculoskeletal: Normal range of motion. Neurological: Alert and oriented to person, place, and time. GCS score is 15. Skin: Skin is warm and dry. No rash noted. No erythema. No pallor. Psychiatric: Mood, memory, affect and judgment normal.           DIAGNOSTIC RESULTS     EKG: All EKG's are interpreted by the Emergency Department Physician who either signs or Co-signs this chart in the absence of a cardiologist.        RADIOLOGY:   All plain film, CT, MRI, and formal ultrasound images (except ED bedside ultrasound) are read by the radiologist, see reports below, unless otherwise noted in MDM or here. XR CHEST PORTABLE   Final Result   No acute cardiopulmonary disease. No results found. LABS:  Labs Reviewed   COVID-19 & INFLUENZA COMBO       All other labs were within normal range or not returned as of this dictation.     EMERGENCY DEPARTMENT COURSE and DIFFERENTIAL DIAGNOSIS/MDM:   Vitals:    Vitals:    04/06/22 1255   BP: 118/62   Pulse: 75   Resp: 15   Temp: 98.2 °F (36.8 °C)   SpO2: 95%   Weight: 200 lb (90.7 kg)   Height: 5' 5.5\" (1.664 m)         Patient instructed to return to the emergency room if symptoms worsen, return, or any other concern right away which is agreed by the patient    ED MEDS:  Orders Placed This Encounter   Medications    doxycycline hyclate (VIBRA-TABS) 100 MG tablet     Sig: Take 1 tablet by mouth 2 times daily for 10 days     Dispense:  20 tablet     Refill:  0    benzonatate (TESSALON) 100 MG capsule     Sig: Take 1 capsule by mouth 3 times daily as needed for Cough     Dispense:  20 capsule     Refill:  0         CONSULTS:  None    PROCEDURES:  None      FINAL IMPRESSION      1. URI with cough and congestion          DISPOSITION/PLAN   DISPOSITION Decision To Discharge    PATIENT REFERRED TO:  [facility] SPECIALIZED SURGERY  [address]  [address] [facility] [address] [phone]  Call       [facility]  [place] [address]  [phone]          DISCHARGE MEDICATIONS:  Discharge Medication List as of 4/6/2022  1:53 PM      START taking these medications    Details   doxycycline hyclate (VIBRA-TABS) 100 MG tablet Take 1 tablet by mouth 2 times daily for 10 days, Disp-20 tablet, R-0Print      benzonatate (TESSALON) 100 MG capsule Take 1 capsule by mouth 3 times daily as needed for Cough, Disp-20 capsule, R-0Print               Summation      Patient Course:  Flu like symptoms x 1 month. Did complete course of amoxicillin with no relief. Pt is a smoker. On exam, lungs are clear. No resp complaints. VSS. Pt is otherwise well appearing. covid + flu are negative. cxr shows no pneumonia. Suspect viral uri however due to  Length of symptoms and smoking hx will start on doxycycline. Tessalon perles. Strict return precautions discussed at bedside. Pt is agreeable   Discussed results and plan with the pt. They expressed appropriate understanding. Pt given close follow up, supportive care instructions and strict return instructions at the bedside. The care is provided during an unprecedented national emergency due to the novel coronavirus, COVID-19.       ED Medications administered this visit:  Medications - No data to display    New Prescriptions from this visit:    Discharge Medication List as of 4/6/2022  1:53 PM      START taking these medications    Details   doxycycline hyclate (VIBRA-TABS) 100 MG tablet Take 1 tablet by mouth 2 times daily for 10 days, Disp-20 tablet, R-0Print      benzonatate (TESSALON) 100 MG capsule Take 1 capsule by mouth 3 times daily as needed for Cough, Disp-20 capsule, R-0Print             Follow-up:  [facility] [address] [facility]  Call       [facility] ED  [facility] [address]            Final Impression:   1. URI with cough and congestion               (Please note that portions of this note were completed with a voice recognition program )        YADIRA Hopson, [place]  04/06/22 2397

## 2023-01-16 ENCOUNTER — HOSPITAL ENCOUNTER (OUTPATIENT)
Age: 33
Setting detail: SPECIMEN
Discharge: HOME | End: 2023-01-16

## 2023-01-17 LAB
HPV SAMPLE: NORMAL
HPV, GENOTYPE 16: NOT DETECTED
HPV, GENOTYPE 18: NOT DETECTED
HPV, HIGH RISK OTHER: NOT DETECTED
HPV, INTERPRETATION: NORMAL
SPECIMEN DESCRIPTION: NORMAL

## 2023-01-18 LAB
CHLAMYDIA BY THIN PREP: NEGATIVE
N. GONORRHOEAE DNA, THIN PREP: NEGATIVE
SPECIMEN DESCRIPTION: NORMAL

## 2023-01-20 LAB — CYTOLOGY REPORT: NORMAL

## 2023-05-02 ENCOUNTER — APPOINTMENT (OUTPATIENT)
Dept: GENERAL RADIOLOGY | Age: 33
End: 2023-05-02
Payer: COMMERCIAL

## 2023-05-02 ENCOUNTER — HOSPITAL ENCOUNTER (EMERGENCY)
Age: 33
Discharge: HOME | End: 2023-05-03
Attending: EMERGENCY MEDICINE
Payer: COMMERCIAL

## 2023-05-02 VITALS
DIASTOLIC BLOOD PRESSURE: 76 MMHG | SYSTOLIC BLOOD PRESSURE: 122 MMHG | BODY MASS INDEX: 29.73 KG/M2 | TEMPERATURE: 97.3 F | OXYGEN SATURATION: 100 % | RESPIRATION RATE: 18 BRPM | HEART RATE: 70 BPM | WEIGHT: 185 LBS | HEIGHT: 66 IN

## 2023-05-02 DIAGNOSIS — M79.642 CHRONIC HAND PAIN, LEFT: Primary | ICD-10-CM

## 2023-05-02 DIAGNOSIS — G89.29 CHRONIC HAND PAIN, LEFT: Primary | ICD-10-CM

## 2023-05-02 PROCEDURE — 6370000000 HC RX 637 (ALT 250 FOR IP): Performed by: STUDENT IN AN ORGANIZED HEALTH CARE EDUCATION/TRAINING PROGRAM

## 2023-05-02 PROCEDURE — 29125 APPL SHORT ARM SPLINT STATIC: CPT

## 2023-05-02 PROCEDURE — 99283 EMERGENCY DEPT VISIT LOW MDM: CPT

## 2023-05-02 PROCEDURE — 73130 X-RAY EXAM OF HAND: CPT

## 2023-05-02 RX ORDER — LIDOCAINE 50 MG/G
1 PATCH TOPICAL DAILY
Qty: 7 PATCH | Refills: 0 | Status: SHIPPED | OUTPATIENT
Start: 2023-05-02 | End: 2023-05-09

## 2023-05-02 RX ORDER — ACETAMINOPHEN 500 MG
1000 TABLET ORAL ONCE
Status: COMPLETED | OUTPATIENT
Start: 2023-05-02 | End: 2023-05-02

## 2023-05-02 RX ORDER — ACETAMINOPHEN 500 MG
1000 TABLET ORAL 3 TIMES DAILY PRN
Qty: 24 TABLET | Refills: 0 | Status: SHIPPED | OUTPATIENT
Start: 2023-05-02

## 2023-05-02 RX ORDER — IBUPROFEN 400 MG/1
600 TABLET ORAL ONCE
Status: COMPLETED | OUTPATIENT
Start: 2023-05-02 | End: 2023-05-03

## 2023-05-02 RX ORDER — IBUPROFEN 600 MG/1
600 TABLET ORAL EVERY 6 HOURS PRN
Qty: 28 TABLET | Refills: 0 | Status: SHIPPED | OUTPATIENT
Start: 2023-05-02

## 2023-05-02 RX ORDER — LIDOCAINE 4 G/G
1 PATCH TOPICAL ONCE
Status: DISCONTINUED | OUTPATIENT
Start: 2023-05-02 | End: 2023-05-03 | Stop reason: HOSPADM

## 2023-05-02 RX ADMIN — ACETAMINOPHEN 1000 MG: 500 TABLET ORAL at 23:08

## 2023-05-02 ASSESSMENT — PAIN SCALES - GENERAL
PAINLEVEL_OUTOF10: 8
PAINLEVEL_OUTOF10: 8

## 2023-05-03 PROCEDURE — 6370000000 HC RX 637 (ALT 250 FOR IP): Performed by: STUDENT IN AN ORGANIZED HEALTH CARE EDUCATION/TRAINING PROGRAM

## 2023-05-03 RX ADMIN — IBUPROFEN 600 MG: 400 TABLET, FILM COATED ORAL at 00:19

## 2023-05-03 ASSESSMENT — PAIN SCALES - GENERAL: PAINLEVEL_OUTOF10: 5

## 2023-05-03 ASSESSMENT — PAIN - FUNCTIONAL ASSESSMENT: PAIN_FUNCTIONAL_ASSESSMENT: NONE - DENIES PAIN

## 2023-05-03 ASSESSMENT — ENCOUNTER SYMPTOMS: COLOR CHANGE: 0
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
## 2023-05-03 NOTE — DISCHARGE INSTRUCTIONS
You have been seen in the emergency department for pain to your left hand. We have provided you a splint to use for comfort, as well as medications that you can use as needed for discomfort. A referral has been provided for you to book an appointment with plastic surgery, who can help further diagnose what is the cause of your pain, and formulate a treatment plan. We have also provided you a referral to the [facility] family practice clinic for routine primary care.

## 2023-09-01 ENCOUNTER — HOSPITAL ENCOUNTER (OUTPATIENT)
Age: 33
Setting detail: SPECIMEN
Discharge: HOME | End: 2023-09-01

## 2023-09-02 LAB
CANDIDA SPECIES: NEGATIVE
GARDNERELLA VAGINALIS: POSITIVE
SOURCE: ABNORMAL
TRICHOMONAS: POSITIVE

## 2023-09-05 LAB
C TRACH DNA SPEC QL PROBE+SIG AMP: NEGATIVE
N GONORRHOEA DNA SPEC QL PROBE+SIG AMP: NEGATIVE
SPECIMEN DESCRIPTION: NORMAL

## 2024-04-20 ENCOUNTER — HOSPITAL ENCOUNTER (EMERGENCY)
Facility: CLINIC | Age: 34
Discharge: HOME | End: 2024-04-20
Attending: EMERGENCY MEDICINE
Payer: COMMERCIAL

## 2024-04-20 VITALS
WEIGHT: 133 LBS | OXYGEN SATURATION: 99 % | RESPIRATION RATE: 14 BRPM | BODY MASS INDEX: 20.16 KG/M2 | HEART RATE: 66 BPM | TEMPERATURE: 97.8 F | SYSTOLIC BLOOD PRESSURE: 134 MMHG | HEIGHT: 68 IN | DIASTOLIC BLOOD PRESSURE: 77 MMHG

## 2024-04-20 DIAGNOSIS — S39.012A BACK STRAIN, INITIAL ENCOUNTER: Primary | ICD-10-CM

## 2024-04-20 DIAGNOSIS — S40.021A ARM BRUISE, RIGHT, INITIAL ENCOUNTER: ICD-10-CM

## 2024-04-20 PROCEDURE — 99283 EMERGENCY DEPT VISIT LOW MDM: CPT

## 2024-04-20 RX ORDER — CYCLOBENZAPRINE HCL 10 MG
10 TABLET ORAL 3 TIMES DAILY PRN
Qty: 30 TABLET | Refills: 0 | Status: SHIPPED | OUTPATIENT
Start: 2024-04-20 | End: 2024-04-30

## 2024-04-20 RX ORDER — NAPROXEN 500 MG/1
500 TABLET ORAL 2 TIMES DAILY WITH MEALS
Qty: 30 TABLET | Refills: 0 | Status: SHIPPED | OUTPATIENT
Start: 2024-04-20 | End: 2024-05-05

## 2024-04-20 ASSESSMENT — ENCOUNTER SYMPTOMS
BACK PAIN: 1
NAUSEA: 0
COUGH: 0
EYE PAIN: 0
SORE THROAT: 0
ABDOMINAL PAIN: 0
SHORTNESS OF BREATH: 0
RHINORRHEA: 0
DIARRHEA: 0
VOMITING: 0

## 2024-04-20 ASSESSMENT — PAIN DESCRIPTION - LOCATION: LOCATION: ARM

## 2024-04-20 ASSESSMENT — PAIN DESCRIPTION - ORIENTATION: ORIENTATION: RIGHT

## 2024-04-20 ASSESSMENT — PAIN SCALES - GENERAL: PAINLEVEL_OUTOF10: 4
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
## 2024-04-20 NOTE — DISCHARGE INSTRUCTIONS
PLEASE RETURN TO THE EMERGENCY DEPARTMENT IMMEDIATELY if your symptoms worsen in anyway or in 1-2 days if not improved for re-evaluation.  You should immediately return to the ER for symptoms such as worsening pain, abdominal pain, bloody stools, numbness or weakness to the arms or legs, difficulty with urination or defecation, difficulty with ambulation, fever, coolness or color change to the extremity, chest pain, shortness of breath, a feeling that you are going to pass out, light headed, dizziness.      Take your medication as indicated and prescribed.  If you are given an antibiotic then, make sure you get the prescription filled and take the antibiotics until finished.      Please understand that at this time there is no evidence for a more serious underlying process, but that early in the process of an illness or injury, an emergency department workup can be falsely reassuring.  You should contact your family doctor within the next 48 hours for a follow up appointment    THANK YOU!!!    From [facility] and [facility]    On behalf of the Emergency Department staff at [facility], I would like to thank you for giving us the opportunity to address your health care needs and concerns.    We hope that during your visit, our service was delivered in a professional and caring manner. Please keep [facility] in mind as we walk with you down the path to your own personal wellness.     Please expect an automated text message or email from us so we can ask a few questions about your health and progress. Based on your answers, a clinician may call you back to offer help and instructions.    Please understand that early in the process of an illness or injury, an emergency department workup can be falsely reassuring.  If you notice any worsening, changing or persistent symptoms please call your family doctor or return to the ER immediately.     Tell us how we did during your visit at

## 2024-04-20 NOTE — ED NOTES
Pt states she walked to ER overnight, pt is cold and hungry, Pt given 3 Warmed Blankets, Turkey sandwich, Pepsi , and chips. Pt given a pair of hospital socks

## 2024-04-20 NOTE — ED NOTES
[facility] [facility] [employer] CALLED FOR PT TO HER APARTMENT ON [address]. VOUCHER GIVEN.

## 2024-04-30 ENCOUNTER — HOSPITAL ENCOUNTER (EMERGENCY)
Age: 34
Discharge: LEFT BEFORE DISCHARGE | End: 2024-04-30

## 2024-04-30 VITALS
HEART RATE: 87 BPM | BODY MASS INDEX: 20.98 KG/M2 | OXYGEN SATURATION: 99 % | DIASTOLIC BLOOD PRESSURE: 86 MMHG | TEMPERATURE: 98.9 F | SYSTOLIC BLOOD PRESSURE: 118 MMHG | RESPIRATION RATE: 16 BRPM | WEIGHT: 138 LBS

## 2024-05-13 ENCOUNTER — HOSPITAL ENCOUNTER (EMERGENCY)
Age: 34
Discharge: ANOTHER HOSPITAL | End: 2024-05-14
Attending: EMERGENCY MEDICINE
Payer: COMMERCIAL

## 2024-05-13 VITALS
OXYGEN SATURATION: 97 % | SYSTOLIC BLOOD PRESSURE: 111 MMHG | HEART RATE: 65 BPM | DIASTOLIC BLOOD PRESSURE: 65 MMHG | RESPIRATION RATE: 16 BRPM | TEMPERATURE: 97.5 F

## 2024-05-13 DIAGNOSIS — R45.851 SUICIDAL IDEATION: Primary | ICD-10-CM

## 2024-05-13 LAB
AMPHET UR QL SCN: NEGATIVE
ANION GAP SERPL CALCULATED.3IONS-SCNC: 12 MMOL/L (ref 9–16)
BACTERIA URNS QL MICRO: NORMAL
BARBITURATES UR QL SCN: NEGATIVE
BASOPHILS # BLD: 0.03 K/UL (ref 0–0.2)
BASOPHILS NFR BLD: 1 % (ref 0–2)
BENZODIAZ UR QL: NEGATIVE
BILIRUB UR QL STRIP: ABNORMAL
BUN SERPL-MCNC: 7 MG/DL (ref 6–20)
CALCIUM SERPL-MCNC: 8.9 MG/DL (ref 8.6–10.4)
CANNABINOIDS UR QL SCN: POSITIVE
CASTS #/AREA URNS LPF: NORMAL /LPF (ref 0–8)
CHLORIDE SERPL-SCNC: 106 MMOL/L (ref 98–107)
CLARITY UR: CLEAR
CO2 SERPL-SCNC: 21 MMOL/L (ref 20–31)
COCAINE UR QL SCN: POSITIVE
COLOR UR: ABNORMAL
CREAT SERPL-MCNC: 0.5 MG/DL (ref 0.5–0.9)
EOSINOPHIL # BLD: 0.03 K/UL (ref 0–0.44)
EOSINOPHILS RELATIVE PERCENT: 1 % (ref 1–4)
EPI CELLS #/AREA URNS HPF: NORMAL /HPF (ref 0–5)
ERYTHROCYTE [DISTWIDTH] IN BLOOD BY AUTOMATED COUNT: 16.3 % (ref 11.8–14.4)
FENTANYL UR QL: NEGATIVE
GFR, ESTIMATED: >90 ML/MIN/1.73M2
GLUCOSE SERPL-MCNC: 79 MG/DL (ref 74–99)
GLUCOSE UR STRIP-MCNC: NEGATIVE MG/DL
HCG SERPL QL: NEGATIVE
HCT VFR BLD AUTO: 32.2 % (ref 36.3–47.1)
HGB BLD-MCNC: 9.8 G/DL (ref 11.9–15.1)
HGB UR QL STRIP.AUTO: NEGATIVE
IMM GRANULOCYTES # BLD AUTO: <0.03 K/UL (ref 0–0.3)
IMM GRANULOCYTES NFR BLD: 1 %
KETONES UR STRIP-MCNC: ABNORMAL MG/DL
LEUKOCYTE ESTERASE UR QL STRIP: NEGATIVE
LYMPHOCYTES NFR BLD: 1.04 K/UL (ref 1.1–3.7)
LYMPHOCYTES RELATIVE PERCENT: 27 % (ref 24–43)
MCH RBC QN AUTO: 26.3 PG (ref 25.2–33.5)
MCHC RBC AUTO-ENTMCNC: 30.4 G/DL (ref 28.4–34.8)
MCV RBC AUTO: 86.6 FL (ref 82.6–102.9)
METHADONE UR QL: NEGATIVE
MONOCYTES NFR BLD: 0.51 K/UL (ref 0.1–1.2)
MONOCYTES NFR BLD: 13 % (ref 3–12)
NEUTROPHILS NFR BLD: 57 % (ref 36–65)
NEUTS SEG NFR BLD: 2.26 K/UL (ref 1.5–8.1)
NITRITE UR QL STRIP: NEGATIVE
NRBC BLD-RTO: 0 PER 100 WBC
OPIATES UR QL SCN: NEGATIVE
OXYCODONE UR QL SCN: NEGATIVE
PCP UR QL SCN: NEGATIVE
PH UR STRIP: 5.5 [PH] (ref 5–8)
PLATELET # BLD AUTO: 305 K/UL (ref 138–453)
PMV BLD AUTO: 9 FL (ref 8.1–13.5)
POTASSIUM SERPL-SCNC: 3.7 MMOL/L (ref 3.7–5.3)
PROT UR STRIP-MCNC: ABNORMAL MG/DL
RBC # BLD AUTO: 3.72 M/UL (ref 3.95–5.11)
RBC # BLD: ABNORMAL 10*6/UL
RBC #/AREA URNS HPF: NORMAL /HPF (ref 0–4)
SODIUM SERPL-SCNC: 139 MMOL/L (ref 136–145)
SP GR UR STRIP: 1.03 (ref 1–1.03)
TEST INFORMATION: ABNORMAL
UROBILINOGEN UR STRIP-ACNC: NORMAL EU/DL (ref 0–1)
WBC #/AREA URNS HPF: NORMAL /HPF (ref 0–5)
WBC OTHER # BLD: 3.9 K/UL (ref 3.5–11.3)

## 2024-05-13 PROCEDURE — 87086 URINE CULTURE/COLONY COUNT: CPT

## 2024-05-13 PROCEDURE — 99285 EMERGENCY DEPT VISIT HI MDM: CPT

## 2024-05-13 PROCEDURE — 80307 DRUG TEST PRSMV CHEM ANLYZR: CPT

## 2024-05-13 PROCEDURE — 80048 BASIC METABOLIC PNL TOTAL CA: CPT

## 2024-05-13 PROCEDURE — 2580000003 HC RX 258

## 2024-05-13 PROCEDURE — 84703 CHORIONIC GONADOTROPIN ASSAY: CPT

## 2024-05-13 PROCEDURE — 85025 COMPLETE CBC W/AUTO DIFF WBC: CPT

## 2024-05-13 PROCEDURE — 81001 URINALYSIS AUTO W/SCOPE: CPT

## 2024-05-13 PROCEDURE — 93005 ELECTROCARDIOGRAM TRACING: CPT

## 2024-05-13 RX ORDER — SODIUM CHLORIDE, SODIUM LACTATE, POTASSIUM CHLORIDE, AND CALCIUM CHLORIDE .6; .31; .03; .02 G/100ML; G/100ML; G/100ML; G/100ML
1000 INJECTION, SOLUTION INTRAVENOUS ONCE
Status: COMPLETED | OUTPATIENT
Start: 2024-05-13 | End: 2024-05-13

## 2024-05-13 RX ADMIN — SODIUM CHLORIDE, POTASSIUM CHLORIDE, SODIUM LACTATE AND CALCIUM CHLORIDE 1000 ML: 600; 310; 30; 20 INJECTION, SOLUTION INTRAVENOUS at 16:20

## 2024-05-13 NOTE — ED PROVIDER NOTES
[facility]     Emergency Department     Faculty Attestation    I performed a history and physical examination of the patient and discussed management with the resident. I reviewed the resident’s note and agree with the documented findings and plan of care. Any areas of disagreement are noted on the chart. I was personally present for the key portions of any procedures. I have documented in the chart those procedures where I was not present during the key portions. I have reviewed the emergency nurses triage note. I agree with the chief complaint, past medical history, past surgical history, allergies, medications, social and family history as documented unless otherwise noted below. Documentation of the HPI, Physical Exam and Medical Decision Making performed by medical students or scribes is based on my personal performance of the HPI, PE and MDM. For Physician Assistant/ Nurse Practitioner cases/documentation I have personally evaluated this patient and have completed at least one if not all key elements of the E/M (history, physical exam, and MDM). Additional findings are as noted.    Vital signs:   Vitals:    05/13/24 1501   BP: 106/66   Pulse: 65   Resp: 18   Temp: 98.2 °F (36.8 °C)   SpO2: 97%      33-year-old female with history of bipolar disorder here with suicidal ideation. No somatic complaints. Social work to see.             Rianna Yepez M.D,  Attending Emergency  Physician           Rianna Yepez MD  05/13/24 3877    
 [facility]  FACULTY HANDOFF       Handoff taken on the following patient from prior Attending Physician:  Pt Name: Radha Opal  PCP:  Patricia Lui MD    Attestation  I was available and discussed any additional care issues that arose and coordinated the management plans with the resident(s) caring for the patient during my duty period. Any areas of disagreement with resident's documentation of care or procedures are noted on the chart. I was personally present for the key portions of any/all procedures during my duty period. I have documented in the chart those procedures where I was not present during the key portions.          Jose Eduardo Ge MD  05/13/24 1957    
of 5/14/2024 12:28 AM          Kandi Mendiola MD  Emergency Medicine Resident    (Please note that portions of thisnote were completed with a voice recognition program.  Efforts were made to edit the dictations but occasionally words are mis-transcribed.)

## 2024-05-14 ENCOUNTER — HOSPITAL ENCOUNTER (INPATIENT)
Age: 34
LOS: 6 days | Discharge: HOME | DRG: 885 | End: 2024-05-20
Attending: PSYCHIATRY & NEUROLOGY | Admitting: PSYCHIATRY & NEUROLOGY
Payer: COMMERCIAL

## 2024-05-14 PROBLEM — F32.A DEPRESSION WITH SUICIDAL IDEATION: Status: ACTIVE | Noted: 2024-05-14

## 2024-05-14 PROBLEM — F31.64 BIPOLAR I DISORDER, MOST RECENT EPISODE MIXED, SEVERE WITH PSYCHOTIC FEATURES (HCC): Status: ACTIVE | Noted: 2024-05-14

## 2024-05-14 PROBLEM — R45.851 DEPRESSION WITH SUICIDAL IDEATION: Status: ACTIVE | Noted: 2024-05-14

## 2024-05-14 LAB
MICROORGANISM SPEC CULT: NORMAL
SPECIMEN DESCRIPTION: NORMAL

## 2024-05-14 PROCEDURE — 6370000000 HC RX 637 (ALT 250 FOR IP)

## 2024-05-14 PROCEDURE — 99222 1ST HOSP IP/OBS MODERATE 55: CPT | Performed by: PSYCHIATRY & NEUROLOGY

## 2024-05-14 PROCEDURE — 99222 1ST HOSP IP/OBS MODERATE 55: CPT | Performed by: INTERNAL MEDICINE

## 2024-05-14 PROCEDURE — 6370000000 HC RX 637 (ALT 250 FOR IP): Performed by: INTERNAL MEDICINE

## 2024-05-14 PROCEDURE — 1240000000 HC EMOTIONAL WELLNESS R&B

## 2024-05-14 PROCEDURE — APPSS60 APP SPLIT SHARED TIME 46-60 MINUTES

## 2024-05-14 RX ORDER — POLYETHYLENE GLYCOL 3350 17 G
2 POWDER IN PACKET (EA) ORAL
Status: DISCONTINUED | OUTPATIENT
Start: 2024-05-14 | End: 2024-05-15

## 2024-05-14 RX ORDER — MAGNESIUM HYDROXIDE/ALUMINUM HYDROXICE/SIMETHICONE 120; 1200; 1200 MG/30ML; MG/30ML; MG/30ML
30 SUSPENSION ORAL EVERY 6 HOURS PRN
Status: DISCONTINUED | OUTPATIENT
Start: 2024-05-14 | End: 2024-05-20 | Stop reason: HOSPADM

## 2024-05-14 RX ORDER — HYDROXYZINE 50 MG/1
50 TABLET, FILM COATED ORAL 3 TIMES DAILY PRN
Status: DISCONTINUED | OUTPATIENT
Start: 2024-05-14 | End: 2024-05-20 | Stop reason: HOSPADM

## 2024-05-14 RX ORDER — HALOPERIDOL 5 MG/ML
5 INJECTION INTRAMUSCULAR EVERY 6 HOURS PRN
Status: DISCONTINUED | OUTPATIENT
Start: 2024-05-14 | End: 2024-05-20 | Stop reason: HOSPADM

## 2024-05-14 RX ORDER — TRAZODONE HYDROCHLORIDE 50 MG/1
50 TABLET ORAL NIGHTLY PRN
Status: DISCONTINUED | OUTPATIENT
Start: 2024-05-14 | End: 2024-05-20 | Stop reason: HOSPADM

## 2024-05-14 RX ORDER — DIPHENHYDRAMINE HYDROCHLORIDE 50 MG/ML
50 INJECTION INTRAMUSCULAR; INTRAVENOUS EVERY 6 HOURS PRN
Status: DISCONTINUED | OUTPATIENT
Start: 2024-05-14 | End: 2024-05-20 | Stop reason: HOSPADM

## 2024-05-14 RX ORDER — LIDOCAINE 4 G/G
1 PATCH TOPICAL DAILY
Status: DISCONTINUED | OUTPATIENT
Start: 2024-05-14 | End: 2024-05-20 | Stop reason: HOSPADM

## 2024-05-14 RX ORDER — OLANZAPINE 5 MG/1
5 TABLET ORAL NIGHTLY
Status: DISCONTINUED | OUTPATIENT
Start: 2024-05-14 | End: 2024-05-16

## 2024-05-14 RX ORDER — ACETAMINOPHEN 325 MG/1
650 TABLET ORAL EVERY 4 HOURS PRN
Status: DISCONTINUED | OUTPATIENT
Start: 2024-05-14 | End: 2024-05-20 | Stop reason: HOSPADM

## 2024-05-14 RX ORDER — HALOPERIDOL 5 MG/1
5 TABLET ORAL EVERY 6 HOURS PRN
Status: DISCONTINUED | OUTPATIENT
Start: 2024-05-14 | End: 2024-05-20 | Stop reason: HOSPADM

## 2024-05-14 RX ORDER — POLYETHYLENE GLYCOL 3350 17 G/17G
17 POWDER, FOR SOLUTION ORAL DAILY PRN
Status: DISCONTINUED | OUTPATIENT
Start: 2024-05-14 | End: 2024-05-20 | Stop reason: HOSPADM

## 2024-05-14 RX ADMIN — OLANZAPINE 5 MG: 5 TABLET, FILM COATED ORAL at 20:23

## 2024-05-14 ASSESSMENT — ENCOUNTER SYMPTOMS
RHINORRHEA: 0
NAUSEA: 0
SHORTNESS OF BREATH: 1

## 2024-05-14 ASSESSMENT — LIFESTYLE VARIABLES
HOW MANY STANDARD DRINKS CONTAINING ALCOHOL DO YOU HAVE ON A TYPICAL DAY: PATIENT UNABLE TO ANSWER
HOW OFTEN DO YOU HAVE A DRINK CONTAINING ALCOHOL: NEVER
HOW OFTEN DO YOU HAVE A DRINK CONTAINING ALCOHOL: PATIENT UNABLE TO ANSWER
HOW MANY STANDARD DRINKS CONTAINING ALCOHOL DO YOU HAVE ON A TYPICAL DAY: PATIENT DOES NOT DRINK

## 2024-05-14 ASSESSMENT — PATIENT HEALTH QUESTIONNAIRE - PHQ9
SUM OF ALL RESPONSES TO PHQ QUESTIONS 1-9: 2
1. LITTLE INTEREST OR PLEASURE IN DOING THINGS: SEVERAL DAYS
SUM OF ALL RESPONSES TO PHQ QUESTIONS 1-9: 2
SUM OF ALL RESPONSES TO PHQ QUESTIONS 1-9: 2
SUM OF ALL RESPONSES TO PHQ9 QUESTIONS 1 & 2: 2
2. FEELING DOWN, DEPRESSED OR HOPELESS: SEVERAL DAYS
SUM OF ALL RESPONSES TO PHQ QUESTIONS 1-9: 2

## 2024-05-14 ASSESSMENT — PAIN SCALES - GENERAL: PAINLEVEL_OUTOF10: 0

## 2024-05-14 ASSESSMENT — SLEEP AND FATIGUE QUESTIONNAIRES
DO YOU HAVE DIFFICULTY SLEEPING: NO
AVERAGE NUMBER OF SLEEP HOURS: 6
DO YOU USE A SLEEP AID: NO

## 2024-05-14 NOTE — GROUP NOTE
Group Therapy Note    Date: 5/14/2024    Group Start Time: 1330  Group End Time: 1410  Group Topic: Cognitive Skills    Beckie Fung CTRS        Group Therapy Note    Attendees: 2/16    Cognitive Skills Group Note        Date: May 14, 2024    Start Time: 1:30pm  End Time: 2:10pm      Number of Participants in Group & Unit Census:  2/16    Topic:  interpersonal skills, memory recall, concentration     Goal of Group: To improve interpersonal skills and memory recall through collaborating with peers and concentrating on a presented task.       Comments:     Patient did not participate in Cognitive Skills group, despite staff encouragement and explanation of benefits.  Patient remain seclusive to self.  Q15 minute safety checks maintained for patient safety and will continue to encourage patient to attend unit programming.        Signature:  GRACE Desai
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
## 2024-05-14 NOTE — GROUP NOTE
Group Therapy Note    Date: 5/14/2024    Group Start Time: 1100  Group End Time: 1140  Group Topic: Psychoeducation    Beckie Fung CTRS        Group Therapy Note    Attendees: 3/17    Psych-Ed/Relapse Prevention Group Note        Date: May 14, 2024       Start Time: 11am  End Time: 11:40am      Number of Participants in Group & Unit Census:  3/17    Topic:  interpersonal skills, coping skills, self-expression    Goal of Group: To improve interpersonal skills and coping skills awareness through collaborating with peers and demonstrating self-expression.      Comments:     Patient did not participate in Psych-Ed/Relapse Prevention group, despite staff encouragement and explanation of benefits.  Patient remain seclusive to self.  Q15 minute safety checks maintained for patient safety and will continue to encourage patient to attend unit programming.        Signature:  GRACE Desai

## 2024-05-14 NOTE — H&P
IN-PATIENT SERVICE  [facility]    CONSULTATION / HISTORY AND PHYSICAL EXAMINATION            Date:   5/14/2024  Patient name:  Radha Joseph  Date of admission:  5/14/2024 12:38 AM  MRN:   [id number]  Account:  [id number]  YOB: 1990  PCP:    Patricia Lui MD  Room:   [address] [place] [address]  Code Status:    Full Code    Physician Requesting Consult: Joss Dennison MD    Reason for Consult:  medical management    Chief Complaint:     No chief complaint on file.      History Obtained From:     Patient medical record nursing staff    History of Present Illness:   Patient, has past medical history of major depression, bipolar disorder, cerebral palsy, admitted to [facility] unit as a transfer from [facility] with worsening depression and suicidal ideation  Never diagnosed with chronic medical condition like diabetes, hypertension, coronary artery disease  Complaining of chronic back pain    Past Medical History:     Past Medical History:   Diagnosis Date    Bipolar affective (HCC)     Cerebral palsy (HCC)     Chicken pox     Depression with suicidal ideation 5/14/2024        Past Surgical History:     Past Surgical History:   Procedure Laterality Date    ARM SURGERY          Medications Prior to Admission:     Prior to Admission medications    Medication Sig Start Date End Date Taking? Authorizing Provider   naproxen (NAPROSYN) 500 MG tablet Take 1 tablet by mouth 2 times daily (with meals) for 30 doses 4/20/24 5/5/24  Christian Cline DO   acetaminophen (TYLENOL) 500 MG tablet Take 2 tablets by mouth 3 times daily as needed for Pain  Patient not taking: Reported on 4/20/2024 5/2/23   Iain Philippe MD   ibuprofen (IBU) 600 MG tablet Take 1 tablet by mouth every 6 hours as needed for Pain  Patient not taking: Reported on 4/20/2024 5/2/23   Iain Philippe MD   benzonatate (TESSALON) 100 MG capsule Take 1 capsule by mouth 3 times daily as needed for 
constant conflicts with the neighbors and her partner as a major stressor.  Reports dealing with significant racing thoughts being up for last several days and some voices bothering her.  Most of her psychotic symptoms are largely secondary to cocaine abuse.  However she is giving is brief.'s of elevated mood with pressured speech and flight of ideas and she is not using cocaine too.At this time will start Zyprexa and observe.  Discussed risk benefits and treatment alternate options and she is agreeable to the plan.     ASSESSMENT  Bipolar I disorder, most recent episode mixed, severe with psychotic features (HCC)    TREATMENT PLAN  Reviewed labs, EKG  Will obtain records/collateral information and review them today.  Medication adjustment: will start Zyprexa and titrate to effect  Consults: none    Risk level: High     Behavioral Services  Medicare Certification     Admission Day 1  I certify that this patient's inpatient psychiatric hospital admission is medically necessary for:    x (1) treatment which could reasonably be expected to improve this patient's condition, or    x (2) diagnostic study or its equivalent.       --Layne Johnson MD on 5/14/2024 at 8:30 PM    An electronic signature was used to authenticate this note.     **This report has been created using voice recognition software. It may contain minor errors which are inherent in voice recognition technology.**
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
## 2024-05-15 ENCOUNTER — APPOINTMENT (OUTPATIENT)
Dept: GENERAL RADIOLOGY | Age: 34
DRG: 885 | End: 2024-05-15
Attending: PSYCHIATRY & NEUROLOGY
Payer: COMMERCIAL

## 2024-05-15 LAB
FERRITIN SERPL-MCNC: 24 NG/ML (ref 13–150)
IRON SATN MFR SERPL: 5 % (ref 20–55)
IRON SERPL-MCNC: 19 UG/DL (ref 37–145)
TIBC SERPL-MCNC: 375 UG/DL (ref 250–450)
UNSATURATED IRON BINDING CAPACITY: 356 UG/DL (ref 112–347)

## 2024-05-15 PROCEDURE — 82728 ASSAY OF FERRITIN: CPT

## 2024-05-15 PROCEDURE — 36415 COLL VENOUS BLD VENIPUNCTURE: CPT

## 2024-05-15 PROCEDURE — 6370000000 HC RX 637 (ALT 250 FOR IP)

## 2024-05-15 PROCEDURE — APPSS30 APP SPLIT SHARED TIME 16-30 MINUTES

## 2024-05-15 PROCEDURE — 6370000000 HC RX 637 (ALT 250 FOR IP): Performed by: INTERNAL MEDICINE

## 2024-05-15 PROCEDURE — 99232 SBSQ HOSP IP/OBS MODERATE 35: CPT | Performed by: PSYCHIATRY & NEUROLOGY

## 2024-05-15 PROCEDURE — 1240000000 HC EMOTIONAL WELLNESS R&B

## 2024-05-15 PROCEDURE — 83540 ASSAY OF IRON: CPT

## 2024-05-15 PROCEDURE — 74018 RADEX ABDOMEN 1 VIEW: CPT

## 2024-05-15 PROCEDURE — 6370000000 HC RX 637 (ALT 250 FOR IP): Performed by: PSYCHIATRY & NEUROLOGY

## 2024-05-15 PROCEDURE — 83550 IRON BINDING TEST: CPT

## 2024-05-15 PROCEDURE — 6370000000 HC RX 637 (ALT 250 FOR IP): Performed by: NURSE PRACTITIONER

## 2024-05-15 PROCEDURE — 99232 SBSQ HOSP IP/OBS MODERATE 35: CPT | Performed by: INTERNAL MEDICINE

## 2024-05-15 RX ORDER — NICOTINE 21 MG/24HR
1 PATCH, TRANSDERMAL 24 HOURS TRANSDERMAL DAILY
Status: DISCONTINUED | OUTPATIENT
Start: 2024-05-15 | End: 2024-05-20 | Stop reason: HOSPADM

## 2024-05-15 RX ORDER — FERROUS SULFATE 325(65) MG
325 TABLET ORAL 2 TIMES DAILY WITH MEALS
Status: DISCONTINUED | OUTPATIENT
Start: 2024-05-15 | End: 2024-05-20 | Stop reason: HOSPADM

## 2024-05-15 RX ADMIN — HYDROXYZINE HYDROCHLORIDE 50 MG: 50 TABLET, FILM COATED ORAL at 07:21

## 2024-05-15 RX ADMIN — HYDROXYZINE HYDROCHLORIDE 50 MG: 50 TABLET, FILM COATED ORAL at 20:57

## 2024-05-15 RX ADMIN — NICOTINE POLACRILEX 2 MG: 2 LOZENGE ORAL at 07:21

## 2024-05-15 RX ADMIN — FERROUS SULFATE TAB 325 MG (65 MG ELEMENTAL FE) 325 MG: 325 (65 FE) TAB at 17:48

## 2024-05-15 RX ADMIN — OLANZAPINE 5 MG: 5 TABLET, FILM COATED ORAL at 20:57

## 2024-05-15 RX ADMIN — HYDROXYZINE HYDROCHLORIDE 50 MG: 50 TABLET, FILM COATED ORAL at 01:57

## 2024-05-15 NOTE — CARE COORDINATION
Patient approached Social Work and stated that she does not want to go to inpatient AOD treatment; Wants to return home and would like re-linked to [facility].

## 2024-05-15 NOTE — GROUP NOTE
Group Therapy Note    Date: 5/15/2024    Group Start Time: 1000  Group End Time: 1050  Group Topic: Psychotherapy     Candace Florence MSW        Group Therapy Note    Attendees: 4/17       Patient's Goal:  Learn about SMART goals and use that framework to create goals in various aspects of life.    Notes:  Patient walked in and out of group multiple times; had her eyes closed most of the time    Status After Intervention:  Unchanged    Participation Level: Active Listener and Minimal    Participation Quality: Lethargic      Speech:  normal      Thought Process/Content: Logical  Linear      Affective Functioning: Flat      Mood:  Flat, Drowsy      Level of consciousness:  Drowsy      Response to Learning: Able to verbalize current knowledge/experience and Able to verbalize/acknowledge new learning      Endings: None Reported    Modes of Intervention: Education and Support      Discipline Responsible: /Counselor      Signature:  MARLIN Tucker

## 2024-05-15 NOTE — GROUP NOTE
Group Therapy Note    Date: 5/15/2024    Group Start Time: 1100  Group End Time: 1150  Group Topic: Cognitive Skills    Lisa Posada CTRS        Group Therapy Note    Attendees: 4/17     Topic: To increase socialization and practice decision making , and communication skills     Notes:  Pt was pleasant and cooperative, and participated fully in group task. Pt was   able to practice decision making , and communication skills independently.         Status After Intervention:  Improved        Participation Level: Active Listener and Interactive r/t  task and topic, supportive        Participation Quality: Appropriate, Attentive, engaged r/t task ,supportive        Speech:  Normal         Thought Process/Content: Logical, linear r/t task .          Affective Functioning: Congruent         Mood: Cooperative, euthymic ,pt was able to initiate and enjoy humor in task.         Level of consciousness:  Alert, and Attentive         Response to Learning: Able to verbalize current knowledge/experience, Able to   verbalize/acknowledge new learning, and Progressing to goal         Endings: None Reported      Modes of Intervention: Education, Support, Socialization, and Problem-solving    Discipline Responsible: Psychoeducational Specialist      Signature:  GRACE ENGLE

## 2024-05-15 NOTE — GROUP NOTE
Group Therapy Note    Date: 5/15/2024    Group Start Time: 1430  Group End Time: 1545  Group Topic: Cognitive Skills    Lisa Posada CTRS        Group Therapy Note    Attendees: 9/16     Patient's Goal: : To increase socialization and practice self expression, and exploring positive   coping skills/resources/outlets using creative expression and discussion.           Notes:  Pt was somewhat resistant to group task, wanted to color instead. With prompts and   enthusiasm of peers in task and conversation pt was pleasant and cooperative, and participated in   group task. Pt was able to practice self expression, and exploring positive outlets   using creative expression and was attentive to discussion.     Pt shared individually ,was supportive of peers,and engaged in group discussion.   Pt was able to relate to some of peers' ideas and experiences.       Status After Intervention:  Improved     Participation Level: Active Listener and Interactive r/t  task and topic, supportive     Participation Quality: Appropriate, Attentive, engaged r/t task ,supportive     Speech:  Normal tone.      Thought Process/Content: Pt shared some logical thoughts r/t music but then stated she was \"writing   another book\" and showed group her drawing and folded paper with writing. Pt stated she had a notebook   when she was 13 y/o, and lost it but then a lot of artists started using her ideas in their songs. Pt states   she is responsible for their success.       Affective Functioning: Blunted, brightened      Mood: Cooperative, delusional thoughts/grandiose briefly      Level of consciousness:  Alert, and Attentive      Response to Learning: Able to verbalize some logical current knowledge/experience, Able to   verbalize/acknowledge some new learning, some delusional thoughts, and Progressing to goal      Endings: None Reported      Modes of Intervention:

## 2024-05-16 PROCEDURE — APPSS30 APP SPLIT SHARED TIME 16-30 MINUTES

## 2024-05-16 PROCEDURE — 6370000000 HC RX 637 (ALT 250 FOR IP): Performed by: PSYCHIATRY & NEUROLOGY

## 2024-05-16 PROCEDURE — 1240000000 HC EMOTIONAL WELLNESS R&B

## 2024-05-16 PROCEDURE — 6370000000 HC RX 637 (ALT 250 FOR IP): Performed by: NURSE PRACTITIONER

## 2024-05-16 PROCEDURE — 99232 SBSQ HOSP IP/OBS MODERATE 35: CPT | Performed by: PSYCHIATRY & NEUROLOGY

## 2024-05-16 PROCEDURE — 6370000000 HC RX 637 (ALT 250 FOR IP): Performed by: INTERNAL MEDICINE

## 2024-05-16 RX ORDER — OLANZAPINE 10 MG/1
10 TABLET ORAL NIGHTLY
Status: DISCONTINUED | OUTPATIENT
Start: 2024-05-16 | End: 2024-05-20 | Stop reason: HOSPADM

## 2024-05-16 RX ADMIN — FERROUS SULFATE TAB 325 MG (65 MG ELEMENTAL FE) 325 MG: 325 (65 FE) TAB at 07:58

## 2024-05-16 RX ADMIN — OLANZAPINE 10 MG: 5 TABLET, FILM COATED ORAL at 22:04

## 2024-05-16 RX ADMIN — FERROUS SULFATE TAB 325 MG (65 MG ELEMENTAL FE) 325 MG: 325 (65 FE) TAB at 17:17

## 2024-05-16 RX ADMIN — HYDROXYZINE HYDROCHLORIDE 50 MG: 50 TABLET, FILM COATED ORAL at 22:04

## 2024-05-16 ASSESSMENT — PAIN DESCRIPTION - DESCRIPTORS: DESCRIPTORS: ACHING

## 2024-05-16 ASSESSMENT — PAIN DESCRIPTION - LOCATION: LOCATION: KNEE;BACK

## 2024-05-16 ASSESSMENT — PAIN SCALES - GENERAL: PAINLEVEL_OUTOF10: 6

## 2024-05-16 NOTE — GROUP NOTE
Group Therapy Note    Date: 5/16/2024    Group Start Time: 1100  Group End Time: 1150  Group Topic: Cognitive Skills    Lisa Posada CTRS        Group Therapy Note    Attendees: 4/16     Topic: To increase socialization and practice decision making , and communication skills     Notes:  Pt was pleasant and cooperative, and participated fully in group task. Pt was   able to practice decision making , and communication skills mostly independently.         Status After Intervention:  Improved        Participation Level: Active Listener and Interactive r/t  task and topic, supportive        Participation Quality: Appropriate, Attentive, engaged r/t task ,supportive        Speech:  Normal         Thought Process/Content: Logical, mostly linear r/t task-pt requires minimal prompts/cues at   intervals to follow steps of task -pt has tendency to lose track of multiple cards and steps at   intervals but is accepting of prompts.          Affective Functioning: Blunted ,brightens         Mood: Cooperative after minimal prompt not to take point from peer, euthymic          Level of consciousness:  Alert, and Attentive         Response to Learning: Able to verbalize current knowledge/experience, Able to   verbalize/acknowledge new learning, and Progressing to goal         Endings: None Reported      Modes of Intervention: Education, Support, Socialization, and Problem-solving    Discipline Responsible: Psychoeducational Specialist      Signature:  GRACE ENGLE

## 2024-05-16 NOTE — GROUP NOTE
Group Therapy Note    Date: 5/16/2024    Group Start Time: 1000  Group End Time: 1050  Group Topic: Psychotherapy     Candace Florence MSW        Group Therapy Note    Attendees: 5/16       Patient's Goal:  Discuss 5 areas of self-care, identify self-care options, and identify one self-care option to use in the future.    Notes:     Status After Intervention:  Improved    Participation Level: Active Listener and Interactive    Participation Quality: Appropriate, Attentive, and Sharing      Speech:  normal      Thought Process/Content: Logical  Linear      Affective Functioning: Congruent      Mood: euthymic      Level of consciousness:  Alert and Oriented x4      Response to Learning: Able to verbalize current knowledge/experience and Able to verbalize/acknowledge new learning      Endings: None Reported    Modes of Intervention: Education, Support, and Socialization      Discipline Responsible: /Counselor      Signature:  MARLIN Tucker

## 2024-05-16 NOTE — GROUP NOTE
Group Therapy Note    Date: 5/16/2024    Group Start Time: 1430  Group End Time: 1520  Group Topic: Cognitive Skills    CHARMAINE BHLisa Means CTRS        Group Therapy Note    Attendees: 7/15     Patient's Goal: : To increase socialization and practice self expression, and exploring individual   preferences, positive qualities, connections to peers, and listening/communication skills.        Notes:  Pt was pleasant and cooperative, and participated fully in group task. Pt was   able to practice self expression, and exploring individual preferences, positive qualities,   connections to peers, and listening/communication skills independently.     Pt shared individually ,was supportive of peers,and engaged in group discussion.   Pt was able to relate to some of peers' ideas and experiences.       Status After Intervention:  Improved     Participation Level: Active Listener and Interactive r/t  task and topic, supportive     Participation Quality: Appropriate, Attentive, engaged r/t task ,supportive     Speech:  Normal .      Thought Process/Content: Logical, linear r/t task .       Affective Functioning: Congruent, Brightened      Mood: Cooperative, euthymic       Level of consciousness:  Alert, and Attentive      Response to Learning: Able to verbalize current knowledge/experience, Able to   verbalize/acknowledge new learning, able to verbalize some insight, and Progressing to goal      Endings: None Reported      Modes of Intervention: Education, Support, Socialization, and Problem-solving    Discipline Responsible: Psychoeducational Specialist      Signature:  GRACE ENGLE

## 2024-05-17 LAB
EKG ATRIAL RATE: 55 BPM
EKG P AXIS: 33 DEGREES
EKG P-R INTERVAL: 170 MS
EKG Q-T INTERVAL: 458 MS
EKG QRS DURATION: 94 MS
EKG QTC CALCULATION (BAZETT): 438 MS
EKG R AXIS: 66 DEGREES
EKG T AXIS: 53 DEGREES
EKG VENTRICULAR RATE: 55 BPM

## 2024-05-17 PROCEDURE — 6370000000 HC RX 637 (ALT 250 FOR IP): Performed by: INTERNAL MEDICINE

## 2024-05-17 PROCEDURE — 6370000000 HC RX 637 (ALT 250 FOR IP): Performed by: PSYCHIATRY & NEUROLOGY

## 2024-05-17 PROCEDURE — 6370000000 HC RX 637 (ALT 250 FOR IP): Performed by: NURSE PRACTITIONER

## 2024-05-17 PROCEDURE — 1240000000 HC EMOTIONAL WELLNESS R&B

## 2024-05-17 PROCEDURE — 99232 SBSQ HOSP IP/OBS MODERATE 35: CPT | Performed by: PSYCHIATRY & NEUROLOGY

## 2024-05-17 RX ADMIN — FERROUS SULFATE TAB 325 MG (65 MG ELEMENTAL FE) 325 MG: 325 (65 FE) TAB at 18:10

## 2024-05-17 RX ADMIN — ACETAMINOPHEN 650 MG: 325 TABLET ORAL at 21:50

## 2024-05-17 RX ADMIN — OLANZAPINE 10 MG: 5 TABLET, FILM COATED ORAL at 21:49

## 2024-05-17 RX ADMIN — FERROUS SULFATE TAB 325 MG (65 MG ELEMENTAL FE) 325 MG: 325 (65 FE) TAB at 08:15

## 2024-05-17 ASSESSMENT — PAIN DESCRIPTION - ORIENTATION: ORIENTATION: LOWER

## 2024-05-17 ASSESSMENT — PAIN DESCRIPTION - LOCATION: LOCATION: BACK

## 2024-05-17 ASSESSMENT — PAIN DESCRIPTION - DESCRIPTORS: DESCRIPTORS: ACHING;DISCOMFORT

## 2024-05-17 ASSESSMENT — PAIN SCALES - GENERAL: PAINLEVEL_OUTOF10: 7

## 2024-05-17 NOTE — GROUP NOTE
Group Therapy Note    Date: 5/17/2024    Group Start Time: 1430  Group End Time: 1520  Group Topic: Cognitive Skills    Lisa Posada CTRS        Group Therapy Note    Attendees: 4/12     Topic: To increase socialization and practice self expression, and explore positive coping   skills through art, music, and discussion.       Notes:  Pt was pleasant and cooperative, and participated fully in group task. Pt was   able to To increase socialization and practice self expression, and explore positive coping   skills through art, music, and discussion.    Pt shared individually and was supportive of peers. Pt was able to relate to some of peers'   ideas and experiences.         Status After Intervention:  Improved        Participation Level: Active Listener and Interactive r/t  task and topic, supportive        Participation Quality: Appropriate, Attentive, engaged r/t task ,supportive        Speech:  Normal tone         Thought Process/Content: Logical, linear r/t task but at times makes grandiose statements   about knowing celebrities, writing books and songs that other artists have recorded.          Affective Functioning: Congruent, brightened         Mood: Cooperative, euthymic but at times needs prompts to take turns with peers.         Level of consciousness:  Alert, and Attentive         Response to Learning: Able to verbalize current knowledge/experience, Able to   verbalize/acknowledge new learning, and Progressing to goal         Endings: None Reported      Modes of Intervention: Education, Support, Socialization, and Problem-solving    Discipline Responsible: Psychoeducational Specialist      Signature:  GRACE ENGLE

## 2024-05-17 NOTE — GROUP NOTE
Group Therapy Note    Date: 5/17/2024    Group Start Time: 1100  Group End Time: 1145  Group Topic: Cognitive Skills    Lisa Posada CTRS        Group Therapy Note    Attendees: 7/15     Topic: To increase socialization and practice problem solving, collaboration with peers, and   communication skills       Notes:  Pt was pleasant and cooperative, and participated fully in group task. Pt was   able to practice problem solving, collaboration with peers, and communication skills  independently.         Status After Intervention:  Improved        Participation Level: Active Listener and Interactive r/t  task and topic, supportive        Participation Quality: Appropriate, Attentive, engaged r/t task ,supportive        Speech:  Normal         Thought Process/Content: Logical, linear r/t task .          Affective Functioning: Congruent, brightened         Mood: Cooperative, euthymic          Level of consciousness:  Alert, and Attentive         Response to Learning: Able to verbalize current knowledge/experience, Able to   verbalize/acknowledge new learning, and Progressing to goal         Endings: None Reported      Modes of Intervention: Education, Support, Socialization, and Problem-solving    Discipline Responsible: Psychoeducational Specialist      Signature:  GRACE ENGLE

## 2024-05-18 PROCEDURE — 99231 SBSQ HOSP IP/OBS SF/LOW 25: CPT | Performed by: INTERNAL MEDICINE

## 2024-05-18 PROCEDURE — 99232 SBSQ HOSP IP/OBS MODERATE 35: CPT | Performed by: PSYCHIATRY & NEUROLOGY

## 2024-05-18 PROCEDURE — 1240000000 HC EMOTIONAL WELLNESS R&B

## 2024-05-18 PROCEDURE — 6370000000 HC RX 637 (ALT 250 FOR IP): Performed by: PSYCHIATRY & NEUROLOGY

## 2024-05-18 PROCEDURE — 6370000000 HC RX 637 (ALT 250 FOR IP): Performed by: NURSE PRACTITIONER

## 2024-05-18 PROCEDURE — 6370000000 HC RX 637 (ALT 250 FOR IP): Performed by: INTERNAL MEDICINE

## 2024-05-18 RX ADMIN — HYDROXYZINE HYDROCHLORIDE 50 MG: 50 TABLET, FILM COATED ORAL at 08:04

## 2024-05-18 RX ADMIN — FERROUS SULFATE TAB 325 MG (65 MG ELEMENTAL FE) 325 MG: 325 (65 FE) TAB at 08:04

## 2024-05-18 RX ADMIN — OLANZAPINE 10 MG: 5 TABLET, FILM COATED ORAL at 21:40

## 2024-05-18 RX ADMIN — FERROUS SULFATE TAB 325 MG (65 MG ELEMENTAL FE) 325 MG: 325 (65 FE) TAB at 17:21

## 2024-05-18 ASSESSMENT — PAIN DESCRIPTION - LOCATION
LOCATION_3: KNEE
LOCATION: BACK

## 2024-05-18 ASSESSMENT — PAIN DESCRIPTION - DESCRIPTORS
DESCRIPTORS_3: ACHING
DESCRIPTORS: ACHING

## 2024-05-18 ASSESSMENT — PAIN DESCRIPTION - PAIN TYPE: TYPE: CHRONIC PAIN

## 2024-05-18 ASSESSMENT — PAIN DESCRIPTION - ORIENTATION
ORIENTATION: LOWER
ORIENTATION_3: POSTERIOR

## 2024-05-18 ASSESSMENT — PAIN - FUNCTIONAL ASSESSMENT: PAIN_FUNCTIONAL_ASSESSMENT: ACTIVITIES ARE NOT PREVENTED

## 2024-05-18 ASSESSMENT — LIFESTYLE VARIABLES
HOW MANY STANDARD DRINKS CONTAINING ALCOHOL DO YOU HAVE ON A TYPICAL DAY: PATIENT DOES NOT DRINK
HOW OFTEN DO YOU HAVE A DRINK CONTAINING ALCOHOL: NEVER

## 2024-05-18 ASSESSMENT — PAIN DESCRIPTION - INTENSITY: RATING_3: 3

## 2024-05-18 NOTE — GROUP NOTE
Group Therapy Note    Date: 5/18/2024    Group Start Time: 0900  Group End Time: 0920  Group Topic: Group Documentation    [facility] Ruth Hoang, RN        Group Therapy Note    Attendees: 7/14       Patient's Goal:  Take a shower and talk to the doctor    Notes:  Goals group    Status After Intervention:  Improved    Participation Level: Active Listener and Interactive    Participation Quality: Appropriate, Attentive, and Sharing      Speech:  normal      Thought Process/Content: Logical  Linear      Affective Functioning: Congruent      Mood: anxious      Level of consciousness:  Alert, Oriented x4, and Attentive      Response to Learning: Able to verbalize current knowledge/experience, Able to verbalize/acknowledge new learning, and Able to retain information      Endings: None Reported    Modes of Intervention: Support, Socialization, and Exploration      Discipline Responsible: Behavorial Health Tech      Signature:  Ruth Tovar RN

## 2024-05-19 PROCEDURE — 1240000000 HC EMOTIONAL WELLNESS R&B

## 2024-05-19 PROCEDURE — 6370000000 HC RX 637 (ALT 250 FOR IP): Performed by: NURSE PRACTITIONER

## 2024-05-19 PROCEDURE — 6370000000 HC RX 637 (ALT 250 FOR IP): Performed by: PSYCHIATRY & NEUROLOGY

## 2024-05-19 PROCEDURE — 99232 SBSQ HOSP IP/OBS MODERATE 35: CPT | Performed by: PSYCHIATRY & NEUROLOGY

## 2024-05-19 PROCEDURE — 6370000000 HC RX 637 (ALT 250 FOR IP): Performed by: INTERNAL MEDICINE

## 2024-05-19 RX ADMIN — HYDROXYZINE HYDROCHLORIDE 50 MG: 50 TABLET, FILM COATED ORAL at 16:54

## 2024-05-19 RX ADMIN — FERROUS SULFATE TAB 325 MG (65 MG ELEMENTAL FE) 325 MG: 325 (65 FE) TAB at 08:28

## 2024-05-19 RX ADMIN — FERROUS SULFATE TAB 325 MG (65 MG ELEMENTAL FE) 325 MG: 325 (65 FE) TAB at 16:54

## 2024-05-19 RX ADMIN — OLANZAPINE 10 MG: 5 TABLET, FILM COATED ORAL at 22:16

## 2024-05-19 RX ADMIN — ACETAMINOPHEN 650 MG: 325 TABLET ORAL at 09:29

## 2024-05-19 RX ADMIN — HYDROXYZINE HYDROCHLORIDE 50 MG: 50 TABLET, FILM COATED ORAL at 08:29

## 2024-05-19 ASSESSMENT — PAIN DESCRIPTION - LOCATION
LOCATION: THROAT
LOCATION: THROAT

## 2024-05-19 ASSESSMENT — PAIN DESCRIPTION - ORIENTATION
ORIENTATION: INNER
ORIENTATION: INNER

## 2024-05-19 ASSESSMENT — LIFESTYLE VARIABLES
HOW OFTEN DO YOU HAVE A DRINK CONTAINING ALCOHOL: NEVER
HOW MANY STANDARD DRINKS CONTAINING ALCOHOL DO YOU HAVE ON A TYPICAL DAY: PATIENT DOES NOT DRINK

## 2024-05-19 ASSESSMENT — PAIN SCALES - GENERAL: PAINLEVEL_OUTOF10: 3

## 2024-05-19 ASSESSMENT — PAIN DESCRIPTION - PAIN TYPE: TYPE: ACUTE PAIN

## 2024-05-19 ASSESSMENT — PAIN DESCRIPTION - DESCRIPTORS
DESCRIPTORS: ACHING
DESCRIPTORS: ACHING

## 2024-05-19 ASSESSMENT — PAIN DESCRIPTION - FREQUENCY: FREQUENCY: INTERMITTENT

## 2024-05-19 ASSESSMENT — PAIN DESCRIPTION - INTENSITY: RATING_3: 3

## 2024-05-19 ASSESSMENT — PAIN - FUNCTIONAL ASSESSMENT: PAIN_FUNCTIONAL_ASSESSMENT: ACTIVITIES ARE NOT PREVENTED

## 2024-05-19 NOTE — GROUP NOTE
Group Therapy Note    Date: 5/19/2024    Group Start Time: 0900  Group End Time: 0915  Group Topic: Group Documentation    Ruth Bolanos, RN        Group Therapy Note    Attendees: 4/13  Community Meeting Group Note        Date: May 19, 2024 Start Time: 9am  End Time:  9:15am      Number of Participants in Group & Unit Census:  4/13    Topic: Goals group    Goal of Group:To establish attainable daily goal(s)      Comments:     Patient did not participate in Community Meeting group, despite staff encouragement and explanation of benefits.  Patient remain seclusive to self.  Q15 minute safety checks maintained for patient safety and will continue to encourage patient to attend unit programming.

## 2024-05-20 VITALS
RESPIRATION RATE: 15 BRPM | HEART RATE: 89 BPM | WEIGHT: 138 LBS | BODY MASS INDEX: 20.92 KG/M2 | TEMPERATURE: 97.7 F | SYSTOLIC BLOOD PRESSURE: 114 MMHG | DIASTOLIC BLOOD PRESSURE: 96 MMHG | OXYGEN SATURATION: 98 % | HEIGHT: 68 IN

## 2024-05-20 PROCEDURE — 6370000000 HC RX 637 (ALT 250 FOR IP): Performed by: NURSE PRACTITIONER

## 2024-05-20 PROCEDURE — 6370000000 HC RX 637 (ALT 250 FOR IP): Performed by: INTERNAL MEDICINE

## 2024-05-20 PROCEDURE — 6370000000 HC RX 637 (ALT 250 FOR IP): Performed by: PSYCHIATRY & NEUROLOGY

## 2024-05-20 PROCEDURE — 99239 HOSP IP/OBS DSCHRG MGMT >30: CPT | Performed by: PSYCHIATRY & NEUROLOGY

## 2024-05-20 RX ORDER — OLANZAPINE 10 MG/1
10 TABLET ORAL NIGHTLY
Qty: 30 TABLET | Refills: 3 | Status: SHIPPED | OUTPATIENT
Start: 2024-05-20

## 2024-05-20 RX ORDER — LIDOCAINE 4 G/G
1 PATCH TOPICAL DAILY
Qty: 7 PATCH | Refills: 0 | Status: SHIPPED | OUTPATIENT
Start: 2024-05-21

## 2024-05-20 RX ORDER — FERROUS SULFATE 325(65) MG
325 TABLET ORAL 2 TIMES DAILY WITH MEALS
Qty: 60 TABLET | Refills: 3 | Status: SHIPPED | OUTPATIENT
Start: 2024-05-20

## 2024-05-20 RX ADMIN — FERROUS SULFATE TAB 325 MG (65 MG ELEMENTAL FE) 325 MG: 325 (65 FE) TAB at 08:40

## 2024-05-20 RX ADMIN — ACETAMINOPHEN 650 MG: 325 TABLET ORAL at 11:48

## 2024-05-20 ASSESSMENT — PAIN - FUNCTIONAL ASSESSMENT: PAIN_FUNCTIONAL_ASSESSMENT: ACTIVITIES ARE NOT PREVENTED

## 2024-05-20 ASSESSMENT — PAIN DESCRIPTION - LOCATION: LOCATION: BACK;HIP

## 2024-05-20 ASSESSMENT — PAIN SCALES - GENERAL
PAINLEVEL_OUTOF10: 3
PAINLEVEL_OUTOF10: 0

## 2024-05-20 ASSESSMENT — PAIN DESCRIPTION - DESCRIPTORS: DESCRIPTORS: ACHING;DISCOMFORT

## 2024-05-20 NOTE — PROGRESS NOTES
Behavioral Services  Medicare Certification Upon Admission    I certify that this patient's inpatient psychiatric hospital admission is medically necessary for:    [x] (1) Treatment which could reasonably be expected to improve this patient's condition,       [x] (2) Or for diagnostic study;     AND     [x](2) The inpatient psychiatric services are provided while the individual is under the care of a physician and are included in the individualized plan of care.    Estimated length of stay/service 3-5 days    Plan for post-hospital care hc    Electronically signed by Layne Johnson MD on 5/14/2024 at 8:30 PM      
  Daily Progress Note  5/16/2024    Patient Name: Radha Joseph    CHIEF COMPLAINT:  Depression with suicidal ideation          SUBJECTIVE:      Patient is seen today for a follow up assessment. Radha is compliant with scheduled medications. She remains behaviorally in control and has not required emergency medications in the past 24 hours. Radha is agreeable to assessment in unit sensory room today.  She is somewhat disorganized today.  Per documentation it seems that patient has been grandiose, reporting in group that she had a journal that was stolen and that now famous artists are using her songs and ideas and have become famous due to them.  She denies depression or anxiety today however appears to lack significant insight into her mental illness.  She reports that she slept well last night and denies issues with her appetite.    She reports improvement in suicidal ideation stating the thoughts are much less intense and severe. She denies homicidal ideation. She denies both auditory and visual hallucinations.  She denies paranoia.  She continues to seem somewhat bizarre.  She denies any side effects to her medications at this time.    Appetite:  [x] Normal/Adequate/Unchanged  [] Increased  [] Decreased      Sleep:       [x] Normal/Adequate/Unchanged  [] Fair  [] Poor      Group Attendance on Unit:   [x] Yes  [] Selectively    [] No    Medication Side Effects:  Patient denies any medication side effects at the time of assessment.         Mental Status Exam  Level of consciousness: Alert and awake.   Appearance: Appropriate attire for setting, seated in chair, with fair  grooming and hygiene.   Behavior/Motor: Approachable, slightly elevated  Attitude toward examiner: Cooperative, attentive, good eye contact.  Speech: Somewhat rapid and pressured, normal volume, normal tone  Mood:  Patient reports \"doing great\".   Affect: Blunted  Thought processes: Mostly linear and coherent but disorganized at 
  Daily Progress Note  5/17/2024    Patient Name: Radha Joseph    CHIEF COMPLAINT:  Depression with suicidal ideation          SUBJECTIVE:      Patient is seen today for a follow up assessment. Radha is compliant with scheduled medications. She remains behaviorally in control and has not required emergency medications in the past 24 hours.The patient reports an improvement in her homicidal ideation towards members of her family without any prompting questions.  She states she rates her homicidal ideation at a 1.5 out of 10 this morning and at the moment it has gone down to 0 out of 10.  The patient is working on coloring.  She has been on affect.  Her olanzapine was increased yesterday.  She has no side effects from this.  We will continue with this medication      Appetite:  [x] Normal/Adequate/Unchanged  [] Increased  [] Decreased      Sleep:       [x] Normal/Adequate/Unchanged  [] Fair  [] Poor      Group Attendance on Unit:   [x] Yes  [] Selectively    [] No    Medication Side Effects:  Patient denies any medication side effects at the time of assessment.         Mental Status Exam  Level of consciousness: Alert and awake.   Appearance: Appropriate attire for setting, seated in chair, with fair  grooming and hygiene.   Behavior/Motor: Approachable, slightly elevated  Attitude toward examiner: Cooperative, attentive, good eye contact.  Speech: Somewhat rapid and pressured, normal volume, normal tone  Mood:  anxious  Affect: Blunted  Thought processes: Mostly linear and coherent but disorganized at times  Thought content: Reports a homicidal ideation score of 1/5 out of 10 (of her own accord).   Suicidal Ideation: Reports improvement in suicidal ideations  Delusions: No evidence of delusions. Denies paranoia.  Perceptual Disturbance: Patient does not appear to be responding to internal stimuli. Denies auditory hallucinations. Denies visual hallucinations.   Cognition: Oriented to self, location, time, and 
  Daily Progress Note  5/18/2024    Patient Name: Radha Joseph    CHIEF COMPLAINT:  Depression with suicidal ideation          SUBJECTIVE:      Patient is seen today for a follow up assessment. The patient has been taking her medication.  She has been complaining of back pain.  She has found patches helpful for her back pain.  Her mood is improving.  Her thought process appears to be improving as well.  She denies any suicidal thoughts.  She denies any paranoia.  We will continue with the current medication without any changes.        Appetite:  [x] Normal/Adequate/Unchanged  [] Increased  [] Decreased      Sleep:       [x] Normal/Adequate/Unchanged  [] Fair  [] Poor      Group Attendance on Unit:   [x] Yes  [] Selectively    [] No    Medication Side Effects:  Patient denies any medication side effects at the time of assessment.         Mental Status Exam  Level of consciousness: Alert and awake.   Appearance: Appropriate attire for setting, seated in chair, with fair  grooming and hygiene.   Behavior/Motor: Approachable, slightly elevated  Attitude toward examiner: Cooperative, attentive, good eye contact.  Speech: Somewhat rapid and pressured, normal volume, normal tone  Mood:  anxious  Affect: constricted  Thought processes: Mostly linear and coherent but disorganized at times  Thought content: Reports a homicidal ideation score of 1/5 out of 10 (of her own accord).   Suicidal Ideation: Reports improvement in suicidal ideations  Delusions: No evidence of delusions. Denies paranoia.  Perceptual Disturbance: Patient does not appear to be responding to internal stimuli. Denies auditory hallucinations. Denies visual hallucinations.   Cognition: Oriented to self, location, time, and situation.  Memory: Intact.  Insight & Judgement: Fair    Data   height is 1.727 m (5' 8\") and weight is 62.6 kg (138 lb). Her temporal temperature is 97.5 °F (36.4 °C). Her blood pressure is 100/72 and her pulse is 71. Her respiration 
  Daily Progress Note  5/19/2024    Patient Name: Radha Joseph    CHIEF COMPLAINT:  Depression with suicidal ideation          SUBJECTIVE:      Patient is seen today for a follow up assessment.  She reports an improvement in her mood.  She is maintaining behavioral control.  She is taking her prescribed medications which she finds helpful.  She has no side effect from these medications.  She is future oriented and hopeful.    Appetite:  [x] Normal/Adequate/Unchanged  [] Increased  [] Decreased      Sleep:       [x] Normal/Adequate/Unchanged  [] Fair  [] Poor      Group Attendance on Unit:   [x] Yes  [] Selectively    [] No    Medication Side Effects:  Patient denies any medication side effects at the time of assessment.         Mental Status Exam  Level of consciousness: Alert and awake.   Appearance: Appropriate attire for setting, seated in chair, with fair  grooming and hygiene.   Behavior/Motor: Approachable, slightly elevated  Attitude toward examiner: Cooperative, attentive, good eye contact.  Speech: Normal rate normal volume, normal tone  Mood: Euthymic  Affect: constricted  Thought processes: Mostly linear and coherent but disorganized at times  Thought content: Reports a homicidal ideation score of 1/5 out of 10 (of her own accord).   Suicidal Ideation: Reports improvement in suicidal ideations  Delusions: No evidence of delusions. Denies paranoia.  Perceptual Disturbance: Patient does not appear to be responding to internal stimuli. Denies auditory hallucinations. Denies visual hallucinations.   Cognition: Oriented to self, location, time, and situation.  Memory: Intact.  Insight & Judgement: Fair    Data   height is 1.727 m (5' 8\") and weight is 62.6 kg (138 lb). Her temporal temperature is 97.5 °F (36.4 °C). Her blood pressure is 102/66 and her pulse is 85. Her respiration is 14 and oxygen saturation is 100%.   Labs:   Admission on 05/14/2024   Component Date Value Ref Range Status    Iron 
CLINICAL PHARMACY NOTE: MEDS TO BEDS    Total # of Prescriptions Filled: 3   The following medications were delivered to the patient:  Olanzapine 10mg  Lidocaine 4% patch  [id number](Iron)    Additional Documentation: p/u at [facility] by SONIA Kilpatrick 5/20/24 12:14pm jenna  
IN-PATIENT SERVICE  [facility]    CONSULTATION / HISTORY AND PHYSICAL EXAMINATION            Date:   5/15/2024  Patient name:  Radha Joseph  Date of admission:  5/14/2024 12:38 AM  MRN:   [id number]  Account:  [id number]  YOB: 1990  PCP:    Patricia Lui MD  Room:   [address] [place] [address]  Code Status:    Full Code    Physician Requesting Consult: Joss Dennison MD    Reason for Consult:  medical management    Chief Complaint:     No chief complaint on file.      History Obtained From:     Patient medical record nursing staff    History of Present Illness:   Patient, has past medical history of major depression, bipolar disorder, cerebral palsy, admitted to [facility] unit as a transfer from [facility] with worsening depression and suicidal ideation  Never diagnosed with chronic medical condition like diabetes, hypertension, coronary artery disease  Complaining of chronic back pain    Past Medical History:     Past Medical History:   Diagnosis Date    Bipolar affective (HCC)     Cerebral palsy (HCC)     Chicken pox     Depression with suicidal ideation 5/14/2024        Past Surgical History:     Past Surgical History:   Procedure Laterality Date    ARM SURGERY          Medications Prior to Admission:     Prior to Admission medications    Medication Sig Start Date End Date Taking? Authorizing Provider   naproxen (NAPROSYN) 500 MG tablet Take 1 tablet by mouth 2 times daily (with meals) for 30 doses 4/20/24 5/5/24  Christian Cline DO   acetaminophen (TYLENOL) 500 MG tablet Take 2 tablets by mouth 3 times daily as needed for Pain  Patient not taking: Reported on 4/20/2024 5/2/23   Iain Philippe MD   ibuprofen (IBU) 600 MG tablet Take 1 tablet by mouth every 6 hours as needed for Pain  Patient not taking: Reported on 4/20/2024 5/2/23   Iain Philippe MD   benzonatate (TESSALON) 100 MG capsule Take 1 capsule by mouth 3 times daily as needed for 
IN-PATIENT SERVICE  [facility]    CONSULTATION / HISTORY AND PHYSICAL EXAMINATION            Date:   5/18/2024  Patient name:  Radha Joseph  Date of admission:  5/14/2024 12:38 AM  MRN:   [id number]  Account:  [id number]  YOB: 1990  PCP:    Patricia Lui MD  Room:   [address] [place] [address]  Code Status:    Full Code    Physician Requesting Consult: Joss Dennison MD    Reason for Consult:  medical management    Chief Complaint:     No chief complaint on file.      History Obtained From:     Patient medical record nursing staff    History of Present Illness:   Patient, has past medical history of major depression, bipolar disorder, cerebral palsy, admitted to [facility] unit as a transfer from [facility] with worsening depression and suicidal ideation  Never diagnosed with chronic medical condition like diabetes, hypertension, coronary artery disease  Complaining of chronic back pain    Past Medical History:     Past Medical History:   Diagnosis Date    Bipolar affective (HCC)     Cerebral palsy (HCC)     Chicken pox     Depression with suicidal ideation 5/14/2024        Past Surgical History:     Past Surgical History:   Procedure Laterality Date    ARM SURGERY          Medications Prior to Admission:     Prior to Admission medications    Medication Sig Start Date End Date Taking? Authorizing Provider   naproxen (NAPROSYN) 500 MG tablet Take 1 tablet by mouth 2 times daily (with meals) for 30 doses 4/20/24 5/5/24  Christian Cline DO   acetaminophen (TYLENOL) 500 MG tablet Take 2 tablets by mouth 3 times daily as needed for Pain  Patient not taking: Reported on 4/20/2024 5/2/23   Iain Philippe MD   ibuprofen (IBU) 600 MG tablet Take 1 tablet by mouth every 6 hours as needed for Pain  Patient not taking: Reported on 4/20/2024 5/2/23   Iain Philippe MD   benzonatate (TESSALON) 100 MG capsule Take 1 capsule by mouth 3 times daily as needed for 
Pharmacy Med Education Group Note    Date: 5/20/24  Start Time: 1330  End Time: 1355    Number Participants in Group:  3    Goal:  Patient will demonstrate an understanding of the medication’s intended purpose and possible adverse effects  Topic: [place] for Pharmacy Med Ed Group    Discipline Responsible:     OT  AT  [facility].  RT     X Other       Participation Level:     None  Minimal      X Active Listener    X Interactive    Monopolizing         Participation Quality:    X Appropriate  Inappropriate     X       Attentive        Intrusive          Sharing        Resistant          Supportive        Lethargic       Affective:     X Congruent  Incongruent  Blunted  Flat    Constricted  Anxious  Elated  Angry    Labile  Depressed  Other         Cognitive:    X Alert  Oriented PPTP     Concentration   X G  F  P   Attention Span   X G  F  P   Short-Term Memory   X G  F  P   Long-Term Memory  G  F  P   ProblemSolving/  Decision Making  G  F  P   Ability to Process  Information   X G  F  P      Contributing Factors             Delusional             Hallucinating             Flight of Ideas             Other:       Modes of Intervention:    X Education   X Support  Exploration    Clarifying  Problem Solving  Confrontation    Socialization  Limit Setting  Reality Testing    Activity  Movement  Media    Other:            Response to Learning:    X Able to verbalize current knowledge/experience    Able to verbalize/acknowledge new learning    Able to retain information    Capable of insight    Able to change behavior    Progressing to goal    Other:        Comments:   Lalita Lopes, SriramD,  5/20/2024, 2:00 PM      
Pharmacy Medication History Note      Patient reports being off medications for six months. Left message at [facility] ([phone]) to clarify last known medication list. Patient has not filled with [facility] ([phone]) in several years. No recent fill history found with PDMP or [facility] dispense report.      Please let me know if you have any questions about this encounter. Thank you!    Electronically signed by Saeed Cummings RPH on 5/14/2024 at 12:03 PM     
RT ASSESSMENT TREATMENT GOALS    [x]Pt Goal:  Pt will identify 1-2 positive coping skills by time of discharge.    []Pt Goal:  Pt will identify 1-2 positive aspects of self by time of discharge.    []Pt Goal:  Pt will remain on task/topic for 15-30 minutes during group by time of discharge.    []Pt Goal:  Pt will identify 1-2 aspects of relapse prevention plan by time of discharge.    [x]Pt Goal:  Pt will join in conversation with peers 1-2 times per group by time of discharge.    []Pt Goal:  Pt will identify 1-2 new leisure interests by time of discharge.    [x]Pt Goal:  Pt will not voice any delusional content by time of discharge.   
NEGATIVE Final    Cutoff: 5 ng/ml    Test Information 05/13/2024 Assay provides rapid clinical screening only.  Presumptive positive results for legal purposes should be confirmed by another method.  To request confirmation, please call the lab within 7 days of sample submission.   Final    WBC, UA 05/13/2024 10 TO 20  0 - 5 /HPF Final    RBC, UA 05/13/2024 2 TO 5  0 - 4 /HPF Final    Reference range defined for non-centrifuged specimen.    Casts UA 05/13/2024 10 TO 20 HYALINE Reference range defined for non-centrifuged specimen.  0 - 8 /LPF Final    Epithelial Cells, UA 05/13/2024 5 TO 10  0 - 5 /HPF Final    Bacteria, UA 05/13/2024 None  None Final    Specimen Description 05/13/2024 .CLEAN CATCH URINE   Final    Culture 05/13/2024 NO SIGNIFICANT GROWTH   Final         Reviewed patient's current plan of care and vital signs with nursing staff.    Labs reviewed: [x] Yes      Medications  Current Facility-Administered Medications: Benzocaine-Menthol (CEPACOL) 1 lozenge, 1 lozenge, Oral, Q2H PRN  nicotine (NICODERM CQ) 14 MG/24HR 1 patch, 1 patch, TransDERmal, Daily  acetaminophen (TYLENOL) tablet 650 mg, 650 mg, Oral, Q4H PRN  polyethylene glycol (GLYCOLAX) packet 17 g, 17 g, Oral, Daily PRN  hydrOXYzine HCl (ATARAX) tablet 50 mg, 50 mg, Oral, TID PRN  traZODone (DESYREL) tablet 50 mg, 50 mg, Oral, Nightly PRN  aluminum & magnesium hydroxide-simethicone (MAALOX) 200-200-20 MG/5ML suspension 30 mL, 30 mL, Oral, Q6H PRN  haloperidol (HALDOL) tablet 5 mg, 5 mg, Oral, Q6H PRN  haloperidol lactate (HALDOL) injection 5 mg, 5 mg, IntraMUSCular, Q6H PRN **AND** diphenhydrAMINE (BENADRYL) injection 50 mg, 50 mg, IntraMUSCular, Q6H PRN  OLANZapine (ZYPREXA) tablet 5 mg, 5 mg, Oral, Nightly  lidocaine 4 % external patch 1 patch, 1 patch, TransDERmal, Daily    ASSESSMENT  Bipolar I disorder, most recent episode mixed, severe with psychotic features (HCC)         HANDOFF  Patient symptoms are: Remains Unstable.  Medications as

## 2024-05-20 NOTE — BH NOTE
[facility] [place]  Admission Note     Admission Type:   Admission Type: Voluntary    Reason for admission:  Patient admitted for depression with suicidal ideation. Patient experiencing suicidal thoughts to overdose on pills or lighting herself on fire. Patient endorses auditory hallucinations of voices that are laughing and going in different directions. Patient stopped taking her psychiatric medications 6 months ago. Patient has a history of schizophrenia and bipolar. Patient denies suicidal or homicidal ideation upon admission. Patient endorses symptoms of anxiety and depression upon arrival but will not elaborate.     Addictive Behavior:   Addictive Behavior  In the Past 3 Months, Have You Felt or Has Someone Told You That You Have a Problem With  : None    Medical Problems:   Past Medical History:   Diagnosis Date    Bipolar affective (HCC)     Cerebral palsy ([facility])     Chicken pox     Depression with suicidal ideation 5/14/2024       Status EXAM:  Mental Status and Behavioral Exam  Normal: No  Level of Assistance: Independent/Self  Facial Expression: Flat, Expressionless  Affect: Blunt  Level of Consciousness: Alert  Frequency of Checks: 4 times per hour, close  Mood:Normal: No  Mood: Depressed, Anxious, Empty, Worthless, low self-esteem  Motor Activity:Normal: Yes  Eye Contact: Good  Observed Behavior: Withdrawn, Cooperative, Preoccupied, Guarded  Sexual Misconduct History: Current - no  Preception: [place] to person, [place] to time, [place] to place, [place] to situation  Attention:Normal: No  Attention: Unable to concentrate, Distractible  Thought Processes: Circumstantial  Thought Content:Normal: No  Thought Content: Poverty of content, Preoccupations  Depression Symptoms: Feelings of helplessness, Feelings of hopelessess, Feelings of worthlessness, Impaired concentration, Loss of interest  Anxiety Symptoms: Generalized  Marilin Symptoms: No problems reported or observed.  Hallucinations: Auditory 
[facility] [place]  Discharge Note    Pt discharged with followings belongings:   Dental Appliances: None  Vision - Corrective Lenses: None  Hearing Aid: None  Jewelry: None  Body Piercings Removed: N/A  Clothing: Footwear, Jacket/Coat, Pants, Shirt, Socks, Undergarments  Other Valuables: Other (Comment) (none)   Valuables sent home with patient at time of discharge. Patient educated on aftercare instructions: YES  Information faxed to [facility] by staff  at 2:59 PM .Patient verbalize understanding of AVS:  YES.    Status EXAM upon discharge:  Mental Status and Behavioral Exam  Normal: No  Level of Assistance: Independent/Self  Facial Expression: Flat  Affect: Blunt  Level of Consciousness: Alert  Frequency of Checks: 4 times per hour, close  Mood:Normal: No  Mood: Anxious  Motor Activity:Normal: Yes  Motor Activity: Agitated  Eye Contact: Good  Observed Behavior: Friendly, Cooperative  Sexual Misconduct History: Current - no  Preception: [place] to person, [place] to time, [place] to place, [place] to situation  Attention:Normal: No  Attention: Distractible  Thought Processes: Circumstantial  Thought Content:Normal: No  Thought Content: Preoccupations  Depression Symptoms: Impaired concentration  Anxiety Symptoms: Generalized  Marilin Symptoms: No problems reported or observed.  Hallucinations: None  Delusions: No  Delusions: Somatic  Memory:Normal: No  Memory: Poor remote  Insight and Judgment: No  Insight and Judgment: Poor insight, Poor judgment    Tobacco Screening:  Practical Counseling, on admission, madelyn X, if applicable and completed (first 3 are required if patient doesn't refuse):            ( ) Recognizing danger situations (included triggers and roadblocks)                    ( ) Coping skills (new ways to manage stress,relaxation techniques, changing routine, distraction)                                                           ( ) Basic information about quitting (benefits of quitting, techniques in 
On call provider, notified of best practice advisory suggesting to place patient on suicide precautions. Provider to discontinue the order as patient does not meet criteria for suicide precautions at this time. Continue to observe patient on every 15 minute checks.   
Patient arrived back to unit safely with 2 [facility] staff. Patient currently resting in bed.   
Patient escorted to radiology with 2 [facility] staff.   
Patient given tobacco quitline number 43017196123 at this time, refusing to call at this time, states \"I just dont want to quit now\"- patient given information as to the dangers of long term tobacco use. Continue to reinforce the importance of tobacco cessation.   
Patient given tobacco quitline number 44533179450 at this time, refusing to call at this time,  patient given information as to the dangers of long term tobacco use. Continue to reinforce the importance of tobacco cessation.    
Patient informed writer that she was able to smuggle a vape pen into her room but was unable to find it. The writer followed patient to room and conducted contraband search. Nothing restricted was found. Patient came to nurses' desk two more times saying that she was still unable to find vape pen. On the third time, 3 nurses conducted a contraband search and again found nothing restricted. Patient came to desk and informed writer that she had found and swallowed the vape pen. Patient was not observed in respiratory distress and able to speak clearly but complained that she was unable to talk, and breathe. Patient was offered Atarax and took with water without any observed difficulties. Writer contacted IM provider on-call who order Abdominal KUB x-ray. Patient transported by wheelchair accompanied by two nurses for x-ray. No foreign object seen on image.  
Writer received report from Darleen concerning patient. All questions were answered.   
Short-Term Goals:  5-7 days    LONG-TERM GOALS UPDATE:   Time frame for Long-Term Goals:  6 months    Members Present in Team Meeting:   See Signatures Sheet   Carly Wolf RN

## 2024-05-20 NOTE — GROUP NOTE
Group Therapy Note    Date: 5/20/2024    Group Start Time: 1100  Group End Time: 1145  Group Topic: Cognitive Skills    Beckie Fung CTRS        Group Therapy Note    Attendees: 3/15       Patient's Goal:  To improve interpersonal skills and memory recall through collaborating with peers and demonstrating self-expression.    Notes:  Patient attended and participated in group intermittently. Patient was able to collaborate with peers and demonstrate self-expression while in group. Patient was pleasant.     Status After Intervention:  Improved    Participation Level: Active Listener and Interactive while in group.    Participation Quality: Appropriate, Attentive, and Sharing while in group. Inattentive at times.       Speech:  normal, rambling       Thought Process/Content: Logical and Linear. Tangential at times.       Affective Functioning: Congruent      Mood: euthymic, anxious about upcoming discharge      Level of consciousness:  Alert and Attentive. Preoccupied at times.       Response to Learning: Able to verbalize current knowledge/experience, Able to retain information, and Progressing to goal      Endings: None Reported    Modes of Intervention: Support, Socialization, Exploration, and Activity      Discipline Responsible: Psychoeducational Specialist      Signature:  GRACE Desai

## 2024-05-20 NOTE — CARE COORDINATION
Name: Radha Joseph    : 1990    Auth # [id number]     Discharge Date: 24    Destination: home     Discharge Medications:      Medication List        START taking these medications      ferrous sulfate 325 (65 Fe) MG tablet  Commonly known as: IRON 325  Take 1 tablet by mouth 2 times daily (with meals)  Notes to patient: Iron supplement     lidocaine 4 % external patch  Place 1 patch onto the skin daily  Start taking on: May 21, 2024  Notes to patient: For pain     OLANZapine 10 MG tablet  Commonly known as: ZYPREXA  Take 1 tablet by mouth nightly  Notes to patient: Mood stabilizer/clears thoughts            CONTINUE taking these medications      acetaminophen 500 MG tablet  Commonly known as: TYLENOL  Take 2 tablets by mouth 3 times daily as needed for Pain  Notes to patient: For pain            STOP taking these medications      ARIPiprazole 30 MG tablet  Commonly known as: ABILIFY     [place] 882 MG/3.2ML Prsy injection  Generic drug: ARIPiprazole lauroxil     benzonatate 100 MG capsule  Commonly known as: TESSALON     divalproex 250 MG extended release tablet  Commonly known as: DEPAKOTE ER     divalproex 500 MG extended release tablet  Commonly known as: DEPAKOTE ER     ibuprofen 600 MG tablet  Commonly known as: IBU     melatonin 5 MG Tabs tablet     naproxen 500 MG tablet  Commonly known as: Naprosyn               Where to Get Your Medications        These medications were sent to [id number] - [address] -  [phone] - F [phone]  [address] [place], [place] [address]      Phone: [phone]   ferrous sulfate 325 (65 Fe) MG tablet  lidocaine 4 % external patch  OLANZapine 10 MG tablet         Follow Up Appointment: [facility] - MAIN OFFICE  [address] [place] [address]  [phone]  Go on 2024  You have an appointment on WED, May 22nd at 8:40AM

## 2024-05-20 NOTE — GROUP NOTE
Group Therapy Note    Date: 5/20/2024    Group Start Time: 1000  Group End Time: 1012  Group Topic: Psychoeducation    CZ Salome Velazquez, ALENW        Group Therapy Note    Attendees: 0/15       patient refused to attend psychoeducation group at 10a after encouragement from staff.  1:1 talk time provided as alternative to group session

## 2024-05-20 NOTE — PLAN OF CARE
Problem: Anxiety  Goal: Will report anxiety at manageable levels  Description: INTERVENTIONS:  1. Administer medication as ordered  2. Teach and rehearse alternative coping skills  3. Provide emotional support with 1:1 interaction with staff  Outcome: Progressing     Problem: Depression  Goal: Will be euthymic at discharge  Description: INTERVENTIONS:  1. Administer medication as ordered  2. Provide emotional support via 1:1 interaction with staff  3. Encourage involvement in milieu/groups/activities  4. Monitor for social isolation  Outcome: Progressing     Problem: Self Harm/Suicidality  Goal: Will have no self-injury during hospital stay  Description: INTERVENTIONS:  1.  Ensure constant observer at bedside with Q15M safety checks  2.  Maintain a safe environment  3.  Secure patient belongings  4.  Ensure family/visitors adhere to safety recommendations  5.  Ensure safety tray has been added to patient's diet order  6.  Every shift and PRN: Re-assess suicidal risk via Frequent Screener    Outcome: Progressing  Note: Patient reports improved fleeting suicidal ideation. Patient denies homicidal ideation. Patient agreeable to seek out staff should thoughts of self harm/harming others worsen/arise. Patient denies hallucinations. Patient is positive for anxiety/depression but does not rate. Patient is pleasant and cooperative, and social with peers. Patient is medication compliant and behavior controlled. Comfort and reassurance provided. Safety checks maintained every 15 minutes and as needed.       
  Problem: Depression  Goal: Will be euthymic at discharge  Description: INTERVENTIONS:  1. Administer medication as ordered  2. Provide emotional support via 1:1 interaction with staff  3. Encourage involvement in milieu/groups/activities  4. Monitor for social isolation  Outcome: Progressing     Problem: Anxiety  Goal: Will report anxiety at manageable levels  Description: INTERVENTIONS:  1. Administer medication as ordered  2. Teach and rehearse alternative coping skills  3. Provide emotional support with 1:1 interaction with staff  Outcome: Progressing     Problem: Self Harm/Suicidality  Goal: Will have no self-injury during hospital stay  Description: INTERVENTIONS:  1.  Ensure constant observer at bedside with Q15M safety checks  2.  Maintain a safe environment  3.  Secure patient belongings  4.  Ensure family/visitors adhere to safety recommendations  5.  Ensure safety tray has been added to patient's diet order  6.  Every shift and PRN: Re-assess suicidal risk via Frequent Screener    Outcome: Progressing  Note: Patient reports improved fleeting suicidal ideation. Patient denies homicidal ideation. Patient agreeable to seek out staff should thoughts of self harm/harming others worsen/arise. Patient denies hallucinations. Patient is positive for anxiety/depression but does not rate. Patient is pleasant and cooperative, and social with peers. Patient is medication compliant and behavior controlled. Comfort and reassurance provided. Safety checks maintained every 15 minutes and as needed.       
  Problem: Psychosis  Goal: Will report no hallucinations or delusions  Description: INTERVENTIONS:  1. Administer medication as  ordered  2. Assist with reality testing to support increasing orientation  3. Assess if patient's hallucinations or delusions are encouraging self harm or harm to others and intervene as appropriate  5/15/2024 0240 by Kota Vidal RN  Outcome: Not Progressing  Patient out several times admitting to hiding a vape pen but unable to find it, accusing roommate of stealing pen and smoking it. 3 Staff conducted contraband search and found nothing. Patient made statements that she would find it eventually and was not satisfied that it was not in her room. Later she approached the nurses station to inform that she had found and swallowed the vape pen. Patient complained of inability to talk and breath with difficulty swallowing but exhibited no signs of respiratory distress and was able to continue talking to writer calmly. Patient provided atarax and swallowed pill and water with no observed distress. Patient was unable to be reasoned with but she was cooperative and teary.      Problem: Self Harm/Suicidality  Goal: Will have no self-injury during hospital stay  Description: INTERVENTIONS:  1.  Ensure constant observer at bedside with Q15M safety checks  2.  Maintain a safe environment  3.  Secure patient belongings  4.  Ensure family/visitors adhere to safety recommendations  5.  Ensure safety tray has been added to patient's diet order  6.  Every shift and PRN: Re-assess suicidal risk via Frequent Screener    5/15/2024 0240 by Kota Vidal, RN  Outcome: Progressing  Patient remains free from observed self harm but reported that she was sleep walking and swallowed her hidden vape pen. Staff did contraband check of the entire room and did not find anything unrestricted. Q 15 minute safety checks maintained. Earlier in the shift patient denied suicidal ideations. 
  Problem: Self Harm/Suicidality  Goal: Will have no self-injury during hospital stay  Description: INTERVENTIONS:  1.  Ensure constant observer at bedside with Q15M safety checks  2.  Maintain a safe environment  3.  Secure patient belongings  4.  Ensure family/visitors adhere to safety recommendations  5.  Ensure safety tray has been added to patient's diet order  6.  Every shift and PRN: Re-assess suicidal risk via Frequent Screener    5/17/2024 2239 by Kim Doshi LPN  Note: Patient is free from self related harm at this time and she denies any ideas of harm to herself or others. Patient is agreeable to seek out staff should thoughts of self harm occur. 15 minute safety checks continue.     Problem: Depression  Goal: Will be euthymic at discharge  Description: INTERVENTIONS:  1. Administer medication as ordered  2. Provide emotional support via 1:1 interaction with staff  3. Encourage involvement in milieu/groups/activities  4. Monitor for social isolation  5/17/2024 2239 by Kim Doshi LPN  Note: Patient reports depression has much improved and rates it a 0.5 on a 1-10 scale. Patient reports sleep and appetite have been adequate . Patient has been compliant with medications this shift. Patient was observed out in dayroom selectively social with peers, watching television and coloring.     Problem: Psychosis  Goal: Will report no hallucinations or delusions  Description: INTERVENTIONS:  1. Administer medication as  ordered  2. Assist with reality testing to support increasing orientation  3. Assess if patient's hallucinations or delusions are encouraging self harm or harm to others and intervene as appropriate  Note: Patient denies hallucinations at this time and does not appear to be responding to internal stimuli.     Problem: Behavior  Goal: Pt/Family maintain appropriate behavior and adhere to behavioral management agreement, if implemented  Description: INTERVENTIONS:  1. Assess 
  Problem: Self Harm/Suicidality  Goal: Will have no self-injury during hospital stay  Description: INTERVENTIONS:  1.  Ensure constant observer at bedside with Q15M safety checks  2.  Maintain a safe environment  3.  Secure patient belongings  4.  Ensure family/visitors adhere to safety recommendations  5.  Ensure safety tray has been added to patient's diet order  6.  Every shift and PRN: Re-assess suicidal risk via Frequent Screener    5/18/2024 1122 by Ericka Baird LPN  Outcome: Progressing     Problem: Depression  Goal: Will be euthymic at discharge  Description: INTERVENTIONS:  1. Administer medication as ordered  2. Provide emotional support via 1:1 interaction with staff  3. Encourage involvement in milieu/groups/activities  4. Monitor for social isolation  5/18/2024 1122 by Ericka Baird LPN  Outcome: Progressing     Problem: Psychosis  Goal: Will report no hallucinations or delusions  Description: INTERVENTIONS:  1. Administer medication as  ordered  2. Assist with reality testing to support increasing orientation  3. Assess if patient's hallucinations or delusions are encouraging self harm or harm to others and intervene as appropriate  5/18/2024 1122 by Ericka Baird LPN  Outcome: Progressing     Problem: Behavior  Goal: Pt/Family maintain appropriate behavior and adhere to behavioral management agreement, if implemented  Description: INTERVENTIONS:  1. Assess patient/family's coping skills and  non-compliant behavior (including use of illegal substances)  2. Notify security of behavior or suspected illegal substances which indicate the need for search of the family and/or belongings  3. Encourage verbalization of thoughts and concerns in a socially appropriate manner  4. Utilize positive, consistent limit setting strategies supporting safety of patient, staff and others  5. Encourage participation in the decision making process about the behavioral management agreement  6. If a visitor's 
  Problem: Self Harm/Suicidality  Goal: Will have no self-injury during hospital stay  Description: INTERVENTIONS:  1.  Ensure constant observer at bedside with Q15M safety checks  2.  Maintain a safe environment  3.  Secure patient belongings  4.  Ensure family/visitors adhere to safety recommendations  5.  Ensure safety tray has been added to patient's diet order  6.  Every shift and PRN: Re-assess suicidal risk via Frequent Screener    5/19/2024 0059 by Kota Vidal RN  Outcome: Progressing  Patient remains safe from all harm while in this hazard free environment. Patient denies suicidal ideations. Patient is engaging and cooperative. Q 15 minute safety checks maintained.     Problem: Anxiety  Goal: Will report anxiety at manageable levels  Description: INTERVENTIONS:  1. Administer medication as ordered  2. Teach and rehearse alternative coping skills  3. Provide emotional support with 1:1 interaction with staff  5/19/2024 0059 by Kota Vidal RN  Outcome: Progressing  Patient endorses anxiety 1/10 but says the anxiety is good due to her anticipation of discharge on Monday. Patient later observed to be somatically anxious complaining of the vape pen that she stated a few days ago she had swallowed was again in her throat causing issues. Writer was able to re-direct patient to previous acknowledgments that the x-ray taken a few days ago showed no foreign bodies in her stomach. Patient recalled the prior conversation and was able to find relief in the situation. Patient was medicinally compliant.     
  Problem: Self Harm/Suicidality  Goal: Will have no self-injury during hospital stay  Description: INTERVENTIONS:  1.  Ensure constant observer at bedside with Q15M safety checks  2.  Maintain a safe environment  3.  Secure patient belongings  4.  Ensure family/visitors adhere to safety recommendations  5.  Ensure safety tray has been added to patient's diet order  6.  Every shift and PRN: Re-assess suicidal risk via Frequent Screener    5/19/2024 1024 by Ericka Baird LPN  Outcome: Progressing     Problem: Depression  Goal: Will be euthymic at discharge  Description: INTERVENTIONS:  1. Administer medication as ordered  2. Provide emotional support via 1:1 interaction with staff  3. Encourage involvement in milieu/groups/activities  4. Monitor for social isolation  Outcome: Progressing     Problem: Psychosis  Goal: Will report no hallucinations or delusions  Description: INTERVENTIONS:  1. Administer medication as  ordered  2. Assist with reality testing to support increasing orientation  3. Assess if patient's hallucinations or delusions are encouraging self harm or harm to others and intervene as appropriate  Outcome: Progressing     Problem: Behavior  Goal: Pt/Family maintain appropriate behavior and adhere to behavioral management agreement, if implemented  Description: INTERVENTIONS:  1. Assess patient/family's coping skills and  non-compliant behavior (including use of illegal substances)  2. Notify security of behavior or suspected illegal substances which indicate the need for search of the family and/or belongings  3. Encourage verbalization of thoughts and concerns in a socially appropriate manner  4. Utilize positive, consistent limit setting strategies supporting safety of patient, staff and others  5. Encourage participation in the decision making process about the behavioral management agreement  6. If a visitor's behavior poses a threat to safety call refer to organization policy.  7. Initiate 
  Problem: Self Harm/Suicidality  Goal: Will have no self-injury during hospital stay  Description: INTERVENTIONS:  1.  Ensure constant observer at bedside with Q15M safety checks  2.  Maintain a safe environment  3.  Secure patient belongings  4.  Ensure family/visitors adhere to safety recommendations  5.  Ensure safety tray has been added to patient's diet order  6.  Every shift and PRN: Re-assess suicidal risk via Frequent Screener    Outcome: Progressing  Flowsheets (Taken 5/15/2024 7224)  Will have no self-injury during hospital stay:   Maintain a safe environment   Every shift and PRN: Re-assess suicidal risk via Frequent Screener  Note: Pt reports minimal depression and moderate anxiety. Emotional support and reassurance provided as needed. Pt denies any suicidal or homicidal ideations, denies any hallucinations. Pt agreed to seek staff and report any intrusive thoughts of hurting self or others.  Pt remains free from any self-harm, safe environment maintained. Regular rounding 4 times an hour and spontaneous patient checks done for safety and per unit policy.        Problem: Depression  Goal: Will be euthymic at discharge  Description: INTERVENTIONS:  1. Administer medication as ordered  2. Provide emotional support via 1:1 interaction with staff  3. Encourage involvement in milieu/groups/activities  4. Monitor for social isolation  Outcome: Progressing  Note: Pt remains flat and sad, but patient out in the day area playing cards and social with select peers.     Problem: Psychosis  Goal: Will report no hallucinations or delusions  Description: INTERVENTIONS:  1. Administer medication as  ordered  2. Assist with reality testing to support increasing orientation  3. Assess if patient's hallucinations or delusions are encouraging self harm or harm to others and intervene as appropriate  Outcome: Progressing  Note: Patient denies any hallucinations during shift assessment, no delusions reported or observed so 
  Problem: Self Harm/Suicidality  Goal: Will have no self-injury during hospital stay  Description: INTERVENTIONS:  1.  Ensure constant observer at bedside with Q15M safety checks  2.  Maintain a safe environment  3.  Secure patient belongings  4.  Ensure family/visitors adhere to safety recommendations  5.  Ensure safety tray has been added to patient's diet order  6.  Every shift and PRN: Re-assess suicidal risk via Frequent Screener    Outcome: Progressing  Flowsheets (Taken 5/20/2024 0118)  Will have no self-injury during hospital stay:   Maintain a safe environment   Secure patient belongings   Every shift and PRN: Re-assess suicidal risk via Frequent Screener  Note: Patient denies thoughts of harm to self or others. Visual safety checks are completed every 15 minutes and randomly.      Problem: Depression  Goal: Will be euthymic at discharge  Description: INTERVENTIONS:  1. Administer medication as ordered  2. Provide emotional support via 1:1 interaction with staff  3. Encourage involvement in milieu/groups/activities  4. Monitor for social isolation  Outcome: Progressing  Note: Patient reports her depression and anxiety are much better. Patient states she is sleeping well. She is compliant with medications.      Problem: Behavior  Goal: Pt/Family maintain appropriate behavior and adhere to behavioral management agreement, if implemented  Description: INTERVENTIONS:  1. Assess patient/family's coping skills and  non-compliant behavior (including use of illegal substances)  2. Notify security of behavior or suspected illegal substances which indicate the need for search of the family and/or belongings  3. Encourage verbalization of thoughts and concerns in a socially appropriate manner  4. Utilize positive, consistent limit setting strategies supporting safety of patient, staff and others  5. Encourage participation in the decision making process about the behavioral management agreement  6. If a visitor's 
Pt reports suicidal  ideations - contracts to safety-will seek staff should SI become worse. Pt reports depression, feeling of hopelessness, helplessness,  difficulty concentrating, difficulty with sleep.   Pt is preoccupied , has difficulty sitting still and is  often seen walking the unit.  Pt showered and changed intro fresh gown. Patient has been out in the dayroom but aloof of peers. Q 15 minute safety checks remain.   Problem: Self Harm/Suicide  Goal: Will have no self-injury during hospital stay  Description: INTERVENTIONS:  1.  Ensure constant observer at bedside with Q15M safety checks  2.  Maintain a safe environment  3.  Secure patient belongings  4.  Ensure family/visitors adhere to safety recommendations  5.  Ensure safety tray has been added to patient's diet order  6.  Every shift and PRN: Re-assess suicidal risk via Frequent Screener    Outcome: Progressing     Problem: Depression  Goal: Will be euthymic at discharge  Description: INTERVENTIONS:  1. Administer medication as ordered  2. Provide emotional support via 1:1 interaction with staff  3. Encourage involvement in milieu/groups/activities  4. Monitor for social isolation  Outcome: Progressing     Problem: Psychosis  Goal: Will report no hallucinations or delusions  Description: INTERVENTIONS:  1. Administer medication as  ordered  2. Assist with reality testing to support increasing orientation  3. Assess if patient's hallucinations or delusions are encouraging self harm or harm to others and intervene as appropriate  Outcome: Progressing     Problem: Behavior  Goal: Pt/Family maintain appropriate behavior and adhere to behavioral management agreement, if implemented  Description: INTERVENTIONS:  1. Assess patient/family's coping skills and  non-compliant behavior (including use of illegal substances)  2. Notify security of behavior or suspected illegal substances which indicate the need for search of the family and/or belongings  3. Encourage 
Generalized  Marilin Symptoms: Less need to sleep, Poor judgment  Hallucinations: None  Delusions: No  Memory:Normal: Yes  Insight and Judgment: No  Insight and Judgment: Poor insight, Poor judgment, Unmotivated    EDUCATION:   Learner Progress Toward Treatment Goals: reviewed group plans and strategies for care    Method:group therapy, medication compliance, individualized assessments and care planning    Outcome: needs reinforcement    PATIENT GOALS: to be discussed with patient within 72 hours    PLAN/TREATMENT RECOMMENDATIONS:     continue group therapy , medications compliance, goal setting, individualized assessments and care, continue to monitor pt on unit      SHORT-TERM GOALS:   Time frame for Short-Term Goals: 5-7 days    LONG-TERM GOALS:  Time frame for Long-Term Goals: 6 months  Members Present in Team Meeting: See Signature Sheet    Shonna Hernandez RN

## 2024-05-20 NOTE — TRANSITION OF CARE
Behavioral Health Transition Record    Patient Name: Radha Joseph  YOB: 1990   Medical Record Number: [id number]  Date of Admission: 5/14/2024 12:38 AM   Date of Discharge: 5/20/2024    Attending Provider: Joss Dennison MD   Discharging Provider: Joss Dennison MD  To contact this individual call [phone] and ask the  to page.  If unavailable, ask to be transferred to Behavioral Health Provider on call.  A Behavioral Health Provider will be available on call 24/7 and during holidays.    Primary Care Provider: Patricia Lui MD    Allergies   Allergen Reactions    Aripiprazole Other (See Comments)     Makes her shake uncontrollably    Invega [Paliperidone Er]      Aggressive         Reason for Admission: Patient admitted for depression with suicidal ideation. Patient experiencing suicidal thoughts to overdose on pills or lighting herself on fire. Patient endorses auditory hallucinations of voices that are laughing and going in different directions. Patient stopped taking her psychiatric medications 6 months ago. Patient has a history of schizophrenia and bipolar. Patient denies suicidal or homicidal ideation upon admission. Patient endorses symptoms of anxiety and depression upon arrival but will not elaborate.     Admission Diagnosis: Depression with suicidal ideation [F32.A, R45.851]    * No surgery found *    Results for orders placed or performed during the hospital encounter of 05/14/24   Iron and TIBC   Result Value Ref Range    Iron 19 (L) 37 - 145 ug/dL    TIBC 375 250 - 450 ug/dL    Iron % Saturation 5 (L) 20 - 55 %    UIBC 356 (H) 112 - 347 ug/dL   Ferritin   Result Value Ref Range    Ferritin 24 13 - 150 ng/mL       Immunizations administered during this encounter:   Immunization History   Administered Date(s) Administered    COVID-19, MODERNA BLUE border, Primary or Immunocompromised, (age 12y+), IM, 100 mcg/0.5mL 03/30/2022, 04/27/2022    DTaP, INFANRIX, (age 6w-6y), IM, 0.5mL

## 2024-05-20 NOTE — DISCHARGE INSTRUCTIONS
Information:  Medications:   Medication summary provided   I understand that I should take only the medications on my list.     -why and when I need to take each medicine.     -which side effects to watch for.     -that I should carry my medication information at all times in case of     Emergency situations.    I will take all of my medicines to follow up appointments.     -check with my physician or pharmacist before taking any new    Medication, over the counter product or drink alcohol.    -Ask about food, drug or dietary supplement interactions.    -discard old lists and update records with medication providers.    Notify Physician:  Notify physician if you notice:   Always call 911 if you feel your life is in danger  In case of an emergency call 911 immediately!  If 911 is not available call your local emergency medical system for help    Behavioral Health Follow Up:  Original Referral Source: [facility]  Discharge Diagnosis: Depression with suicidal ideation [F32.A, R45.851]  Recommendations for Level of Care: outpatient community mental health follow up  Patient status at discharge: stable, voices readiness for discharge  My hospital  was: [facility]  Aftercare plan faxed: YES   -faxed by: staff   -date: 5/20/2024   -time: 1300  Prescriptions: [facility] outpatient pharmacy    Smoking: Quit Smoking.   Call the [facility]'s smoking quitline at 0-907-10T-QUIT  Know the signs of a heart attack   If you have any of the following symptoms call 911 immediately, do not wait more    Than five minutes.    1. Pressure, fullness and/ or squeezing in the center of the chest spreading to    The jaw, neck or shoulder.    2. Chest discomfort with light headedness, fainting, sweating, nausea or    Shortness of breath.   3. Upper abdominal pressure or discomfort.   4. Lower chest pain, back pain, unusual fatigue, shortness of breath, nausea   Or dizziness.     General Information:   Questions regarding your bill:

## 2024-05-20 NOTE — DISCHARGE SUMMARY
started to feel better with this combination of treatment.  Significant progress in the symptoms since admission.    Mood is improved  The patient denies AVH or paranoid thoughts  The patient denies any hopelessness or worthlessness  No active SI/HI  Appetite:  [x] Normal  [] Increased  [] Decreased    Sleep:       [x] Normal  [] Fair       [] Poor            Energy:    [x] Normal  [] Increased  [] Decreased     SI [] Present  [x] Absent  HI  []Present  [x] Absent   Aggression:  [] yes  [] no  Patient is [x] able  [] unable to CONTRACT FOR SAFETY   Medication side effects(SE):  [x] None(Psych. Meds.) [] Other      Mental Status Examination on discharge:    Level of consciousness:  within normal limits   Appearance:  well-appearing  Behavior/Motor:  no abnormalities noted  Attitude toward examiner:  attentive and good eye contact  Speech:  spontaneous, normal rate and normal volume   Mood: euthymic  Affect:  mood congruent  Thought processes:  goal directed and coherent   Thought content:  Suicidal Ideation:  denies suicidal ideation  Delusions:  no evidence of delusions  Perceptual Disturbance:  denies any perceptual disturbance  Cognition:  oriented to person, place, and time   Concentration intact  Memory intact  Insight good   Judgement fair   Fund of Knowledge adequate      ASSESSMENT:  Patient symptoms are:  [x] Well controlled  [x] Improving  [] Worsening  [] No change      Diagnosis:  Principal Problem:    Bipolar I disorder, most recent episode mixed, severe with psychotic features (HCC)  Active Problems:    Depression with suicidal ideation  Resolved Problems:    * No resolved hospital problems. *      LABS:    No results for input(s): \"WBC\", \"HGB\", \"PLT\" in the last 72 hours.  No results for input(s): \"NA\", \"K\", \"CL\", \"CO2\", \"BUN\", \"CREATININE\", \"GLUCOSE\" in the last 72 hours.  No results for input(s): \"BILITOT\", \"ALKPHOS\", \"AST\", \"ALT\" in the last 72 hours.  Lab Results   Component Value Date/Time

## 2024-08-01 ENCOUNTER — HOSPITAL ENCOUNTER (EMERGENCY)
Age: 34
Discharge: HOME | End: 2024-08-01
Attending: EMERGENCY MEDICINE
Payer: COMMERCIAL

## 2024-08-01 VITALS
WEIGHT: 150 LBS | RESPIRATION RATE: 16 BRPM | OXYGEN SATURATION: 97 % | TEMPERATURE: 98.2 F | HEART RATE: 88 BPM | DIASTOLIC BLOOD PRESSURE: 71 MMHG | SYSTOLIC BLOOD PRESSURE: 95 MMHG | BODY MASS INDEX: 23.54 KG/M2 | HEIGHT: 67 IN

## 2024-08-01 DIAGNOSIS — Z59.00 HOMELESS: Primary | ICD-10-CM

## 2024-08-01 DIAGNOSIS — F19.10 POLYSUBSTANCE ABUSE (HCC): ICD-10-CM

## 2024-08-01 PROCEDURE — 99285 EMERGENCY DEPT VISIT HI MDM: CPT

## 2024-08-01 SDOH — ECONOMIC STABILITY - HOUSING INSECURITY: HOMELESSNESS UNSPECIFIED: Z59.00

## 2024-08-01 ASSESSMENT — LIFESTYLE VARIABLES
HOW OFTEN DO YOU HAVE A DRINK CONTAINING ALCOHOL: 4 OR MORE TIMES A WEEK
HOW MANY STANDARD DRINKS CONTAINING ALCOHOL DO YOU HAVE ON A TYPICAL DAY: 5 OR 6

## 2024-08-01 ASSESSMENT — PAIN - FUNCTIONAL ASSESSMENT: PAIN_FUNCTIONAL_ASSESSMENT: NONE - DENIES PAIN

## 2024-08-01 NOTE — ED NOTES
Patient is a 33 year old  female who presented to ED via Law Enforcement for a mental health evaluation.  Per officers, they attempted to take this patient to the [facility], but they refused her when they arrived on their location.    Per chart review, and per shift change report, this patient was seen at [facility] on 7/31/24 for a mental health evaluation.  [facility], per policy, did attempt to complete a direct admission of patient to [facility].  According to notes, prior to admission order from psychiatrist, patient behaviors \"escalated\" therefore [facility] was transporting this patient to [facility].  Per  staff, when they arrived at ED, the patient did not want them to walk her into this facility but they did watch her enter the building.  It is important to note, the patient never signed herself into this facility for an evaluation.  Per  staff, they did not have enough criteria to place her on an application for emergency admission, which is why they allowed her to walk in independently.    Upon arrival to ED on this visit, patient does present as somewhat disorganized, but is able to appropriately answer different assessment questions.  Patient initially denies SI or HI, but will rapidly change her mind, depending on how many questions she is asked to answer.  Patient stated \"no I'm not suicidal, but if you keep asking questions, I might be\".  Patient then stated she has no intention to harm other people but she \"just might if they threaten me\".  Patient reports she is prescribed Hydroxyzine and she \"used to\" be taking it as prescribed, but has not been able to find it recently so she hasn't taken it.  Patient reports she has not slept in 3.5 months and has not eaten in 3 weeks.  Patient is homeless.  Patient has poor insight.  Patient has poor judgement.  Patient admits to using alcohol, cocaine and THC.  Patient requesting inpatient/detox services.    Patient provided information on

## 2024-08-01 NOTE — ED PROVIDER NOTES
EMERGENCY DEPARTMENT ENCOUNTER    Pt Name: Radha Joseph  MRN: [id number]  Birthdate 1990  Date of evaluation: 8/1/24  CHIEF COMPLAINT       Chief Complaint   Patient presents with    Suicidal     From [facility] Feeling homicidal. Brought in by [employer] to take to [facility], then stated they couldn't take her because she just left. Patient currently still feels homicidal. [place] slipped at [facility] but had to come here.      HISTORY OF PRESENT ILLNESS   Presenting for mental health evaluation.  Patient was at [facility] and they attempted to direct admit her here yesterday because her behavior \"escalated \".  Patient never ended up signing herself and to the facility for an evaluation.  She presents today saying that she is suicidal because she is coming from the [facility] stating that they could not take her because she just left.  She said this also made her feel homicidal.    The history is provided by the patient.           REVIEW OF SYSTEMS     Review of Systems   Constitutional:  Negative for chills and fever.   HENT:  Negative for congestion.    Eyes:  Negative for visual disturbance.   Respiratory:  Negative for cough and shortness of breath.    Cardiovascular:  Negative for chest pain.   Gastrointestinal:  Negative for abdominal pain, nausea and vomiting.   Genitourinary:  Negative for dysuria, flank pain, frequency and urgency.   Musculoskeletal:  Negative for myalgias.   Neurological:  Negative for dizziness, light-headedness and headaches.   Psychiatric/Behavioral:  Positive for suicidal ideas.      PASTMEDICAL HISTORY     Past Medical History:   Diagnosis Date    Bipolar affective ([facility])     Cerebral palsy (HCC)     Chicken pox     Depression with suicidal ideation 5/14/2024     Past Problem List  Patient Active Problem List   Diagnosis Code    Other schizophrenia ([facility]) F20.89    Flexural eczema L20.82    Toenail fungus B35.1    Schizoaffective disorder, bipolar type  kg/m²    Physical Exam  Vitals and nursing note reviewed.   Constitutional:       General: She is not in acute distress.     Appearance: She is not ill-appearing.   HENT:      Head: Normocephalic.      Right Ear: External ear normal.      Left Ear: External ear normal.      Mouth/Throat:      Mouth: Mucous membranes are moist.   Eyes:      Extraocular Movements: Extraocular movements intact.   Cardiovascular:      Rate and Rhythm: Normal rate and regular rhythm.      Pulses:           Radial pulses are 2+ on the right side and 2+ on the left side.        Dorsalis pedis pulses are 2+ on the right side and 2+ on the left side.        Posterior tibial pulses are 2+ on the right side and 2+ on the left side.      Heart sounds: Normal heart sounds.   Pulmonary:      Effort: Pulmonary effort is normal. No respiratory distress.   Abdominal:      General: There is no distension.      Palpations: Abdomen is soft.      Tenderness: There is no abdominal tenderness.   Musculoskeletal:         General: Normal range of motion.   Skin:     General: Skin is warm.   Neurological:      General: No focal deficit present.      Mental Status: She is alert and oriented to person, place, and time.   Psychiatric:         Behavior: Behavior is agitated.         Thought Content: Thought content does not include homicidal or suicidal ideation. Thought content does not include homicidal or suicidal plan.         MEDICAL DECISION MAKING / ED COURSE:         1)  Number and Complexity of Problems Addressed at this Encounter  Problem List This Visit: Mental health evaluation    Presenting for mental health evaluation.  Patient was at [facility] and they attempted to direct admit her here yesterday because her behavior \"escalated \".  Patient never ended up signing herself and to the facility for an evaluation.  She presents today saying that she is suicidal because she is coming from the [facility] stating that they could not

## 2024-08-02 ENCOUNTER — HOSPITAL ENCOUNTER (EMERGENCY)
Age: 34
Discharge: HOME | End: 2024-08-02
Attending: STUDENT IN AN ORGANIZED HEALTH CARE EDUCATION/TRAINING PROGRAM
Payer: COMMERCIAL

## 2024-08-02 VITALS
TEMPERATURE: 98 F | WEIGHT: 150 LBS | DIASTOLIC BLOOD PRESSURE: 82 MMHG | OXYGEN SATURATION: 99 % | RESPIRATION RATE: 17 BRPM | HEIGHT: 67 IN | SYSTOLIC BLOOD PRESSURE: 111 MMHG | HEART RATE: 69 BPM | BODY MASS INDEX: 23.54 KG/M2

## 2024-08-02 DIAGNOSIS — Z59.00 HOMELESSNESS: Primary | ICD-10-CM

## 2024-08-02 PROCEDURE — 99283 EMERGENCY DEPT VISIT LOW MDM: CPT

## 2024-08-02 SDOH — ECONOMIC STABILITY - HOUSING INSECURITY: HOMELESSNESS UNSPECIFIED: Z59.00

## 2024-08-02 ASSESSMENT — LIFESTYLE VARIABLES
HOW OFTEN DO YOU HAVE A DRINK CONTAINING ALCOHOL: PATIENT DECLINED
HOW MANY STANDARD DRINKS CONTAINING ALCOHOL DO YOU HAVE ON A TYPICAL DAY: PATIENT DECLINED

## 2024-08-02 ASSESSMENT — ENCOUNTER SYMPTOMS
SHORTNESS OF BREATH: 0
ABDOMINAL PAIN: 0

## 2024-08-02 NOTE — ED NOTES
Mode of arrival (squad #, walk in, police, etc) : walk-in        Chief complaint(s): confusion,sleeplessness        Arrival Note (brief scenario, treatment PTA, etc).: Patient presents with confusion, states \" I am upset, I am always here and you keep questioning me\". As per patient \" I could not sleep\". Patient refused to answer screening questions just stating that \" I am not answering your questions\".        C= \"Have you ever felt that you should Cut down on your drinking?\"  refused  A= \"Have people Annoyed you by criticizing your drinking?\"  Refused  G= \"Have you ever felt bad or Guilty about your drinking?\"  Refused  E= \"Have you ever had a drink as an Eye-opener first thing in the morning to steady your nerves or to help a hangover?\"  Refused      Deferred []      Reason for deferring: N/A    *If yes to two or more: probable alcohol abuse.*

## 2024-08-02 NOTE — ED PROVIDER NOTES
[facility]  Emergency Department Encounter  Emergency Medicine Physician     Pt Name: Radha Joseph  MRN: [id number]  Birthdate 1990  Date of evaluation: 8/2/24  PCP:  Patricia Lui MD    CHIEF COMPLAINT       Chief Complaint   Patient presents with    Altered Mental Status     confusion       HISTORY OF PRESENT ILLNESS  (Location/Symptom, Timing/Onset, Context/Setting, Quality, Duration, Modifying Factors, Severity.)    Radha Joseph is a 33 y.o. female who presents with homelessness.  Patient states that she does not have anywhere to go so she decided to come back to the hospital.  Patient was seen and evaluated earlier and requested inpatient treatment for drug and alcohol.  She was sent over to [facility].  Patient states that she never got to [facility] and then she left and then she came back here directly.  When I explained to her that the timing of that did not work out, patient states that she wandered around for a bit and then decided to come back to the hospital.  States that she is feeling depressed because she does not have anywhere to stay.  When I asked her why she did not stay at [facility], she states that \"I did not want to stay there anymore\".  Denies any suicidal ideation or homicidal ideation.  Denies any hallucinations or delusions        PAST MEDICAL / SURGICAL / SOCIAL / FAMILY HISTORY    has a past medical history of Bipolar affective (HCC), Cerebral palsy (HCC), Chicken pox, and Depression with suicidal ideation.     has a past surgical history that includes Arm Surgery.    Social History     Socioeconomic History    Marital status: Single     Spouse name: Not on file    Number of children: 1    Years of education: 14    Highest education level: Not on file   Occupational History    Not on file   Tobacco Use    Smoking status: Former     Current packs/day: 0.00     Average packs/day: 0.5 packs/day for 15.0 years (7.5 ttl pk-yrs)     Types: Cigarettes     Start date:

## 2024-08-02 NOTE — ED NOTES
Patient given discharge paperwork and they are in no rush to leave to lobby notified charge nurse

## 2024-08-02 NOTE — ED NOTES
Patient awaken informed of discharge need to have destination for discharge patient refuses to talk with speaker patient refuse to go to waiting room at present [facility] called for follow up

## 2024-08-03 ASSESSMENT — ENCOUNTER SYMPTOMS
NAUSEA: 0
ABDOMINAL PAIN: 0
SHORTNESS OF BREATH: 0
COUGH: 0
VOMITING: 0

## 2025-02-04 ENCOUNTER — HOSPITAL ENCOUNTER (EMERGENCY)
Age: 35
Discharge: HOME | End: 2025-02-04
Payer: COMMERCIAL

## 2025-02-04 VITALS
BODY MASS INDEX: 28.12 KG/M2 | DIASTOLIC BLOOD PRESSURE: 72 MMHG | WEIGHT: 175 LBS | OXYGEN SATURATION: 97 % | HEIGHT: 66 IN | SYSTOLIC BLOOD PRESSURE: 120 MMHG | RESPIRATION RATE: 14 BRPM | HEART RATE: 84 BPM | TEMPERATURE: 97.4 F

## 2025-02-04 DIAGNOSIS — R19.7 NAUSEA VOMITING AND DIARRHEA: Primary | ICD-10-CM

## 2025-02-04 DIAGNOSIS — R11.2 NAUSEA VOMITING AND DIARRHEA: Primary | ICD-10-CM

## 2025-02-04 DIAGNOSIS — A08.4 VIRAL GASTROENTERITIS: ICD-10-CM

## 2025-02-04 LAB
ALBUMIN SERPL-MCNC: 4.2 G/DL (ref 3.5–5.2)
ALP SERPL-CCNC: 76 U/L (ref 35–104)
ALT SERPL-CCNC: 8 U/L (ref 0–32)
ANION GAP SERPL CALCULATED.3IONS-SCNC: 13 MMOL/L (ref 7–16)
AST SERPL-CCNC: 16 U/L (ref 0–31)
BACTERIA URNS QL MICRO: ABNORMAL
BASOPHILS # BLD: 0.08 K/UL (ref 0–0.2)
BASOPHILS NFR BLD: 1 % (ref 0–2)
BILIRUB SERPL-MCNC: 0.2 MG/DL (ref 0–1.2)
BILIRUB UR QL STRIP: NEGATIVE
BUN SERPL-MCNC: 12 MG/DL (ref 6–20)
CALCIUM SERPL-MCNC: 9.2 MG/DL (ref 8.6–10.2)
CHLORIDE SERPL-SCNC: 102 MMOL/L (ref 98–107)
CLARITY UR: CLEAR
CO2 SERPL-SCNC: 23 MMOL/L (ref 22–29)
COLOR UR: YELLOW
CREAT SERPL-MCNC: 0.6 MG/DL (ref 0.5–1)
EOSINOPHIL # BLD: 0.29 K/UL (ref 0.05–0.5)
EOSINOPHILS RELATIVE PERCENT: 2 % (ref 0–6)
EPI CELLS #/AREA URNS HPF: ABNORMAL /HPF
ERYTHROCYTE [DISTWIDTH] IN BLOOD BY AUTOMATED COUNT: 18.3 % (ref 11.5–15)
FLUAV RNA RESP QL NAA+PROBE: NOT DETECTED
FLUBV RNA RESP QL NAA+PROBE: NOT DETECTED
GFR, ESTIMATED: >90 ML/MIN/1.73M2
GLUCOSE SERPL-MCNC: 90 MG/DL (ref 74–99)
GLUCOSE UR STRIP-MCNC: NEGATIVE MG/DL
HCT VFR BLD AUTO: 36.3 % (ref 34–48)
HGB BLD-MCNC: 11.8 G/DL (ref 11.5–15.5)
HGB UR QL STRIP.AUTO: ABNORMAL
IMM GRANULOCYTES # BLD AUTO: 0.08 K/UL (ref 0–0.58)
IMM GRANULOCYTES NFR BLD: 1 % (ref 0–5)
KETONES UR STRIP-MCNC: NEGATIVE MG/DL
LACTATE BLDV-SCNC: 1.1 MMOL/L (ref 0.5–2.2)
LEUKOCYTE ESTERASE UR QL STRIP: NEGATIVE
LIPASE SERPL-CCNC: 19 U/L (ref 13–60)
LYMPHOCYTES NFR BLD: 1.72 K/UL (ref 1.5–4)
LYMPHOCYTES RELATIVE PERCENT: 14 % (ref 20–42)
MCH RBC QN AUTO: 26.5 PG (ref 26–35)
MCHC RBC AUTO-ENTMCNC: 32.5 G/DL (ref 32–34.5)
MCV RBC AUTO: 81.6 FL (ref 80–99.9)
MONOCYTES NFR BLD: 1.08 K/UL (ref 0.1–0.95)
MONOCYTES NFR BLD: 9 % (ref 2–12)
NEUTROPHILS NFR BLD: 74 % (ref 43–80)
NEUTS SEG NFR BLD: 9.18 K/UL (ref 1.8–7.3)
NITRITE UR QL STRIP: NEGATIVE
PH UR STRIP: 6 [PH] (ref 5–8)
PLATELET # BLD AUTO: 335 K/UL (ref 130–450)
PMV BLD AUTO: 8.6 FL (ref 7–12)
POTASSIUM SERPL-SCNC: 4.2 MMOL/L (ref 3.5–5)
PROT SERPL-MCNC: 8 G/DL (ref 6.4–8.3)
PROT UR STRIP-MCNC: NEGATIVE MG/DL
RBC # BLD AUTO: 4.45 M/UL (ref 3.5–5.5)
RBC #/AREA URNS HPF: ABNORMAL /HPF
SARS-COV-2 RNA RESP QL NAA+PROBE: NOT DETECTED
SODIUM SERPL-SCNC: 138 MMOL/L (ref 132–146)
SOURCE: NORMAL
SP GR UR STRIP: 1.02 (ref 1–1.03)
SPECIMEN DESCRIPTION: NORMAL
UROBILINOGEN UR STRIP-ACNC: 0.2 EU/DL (ref 0–1)
WBC #/AREA URNS HPF: ABNORMAL /HPF
WBC OTHER # BLD: 12.4 K/UL (ref 4.5–11.5)

## 2025-02-04 PROCEDURE — 83690 ASSAY OF LIPASE: CPT

## 2025-02-04 PROCEDURE — 81001 URINALYSIS AUTO W/SCOPE: CPT

## 2025-02-04 PROCEDURE — 83605 ASSAY OF LACTIC ACID: CPT

## 2025-02-04 PROCEDURE — 85025 COMPLETE CBC W/AUTO DIFF WBC: CPT

## 2025-02-04 PROCEDURE — 87636 SARSCOV2 & INF A&B AMP PRB: CPT

## 2025-02-04 PROCEDURE — 99283 EMERGENCY DEPT VISIT LOW MDM: CPT

## 2025-02-04 PROCEDURE — 80053 COMPREHEN METABOLIC PANEL: CPT

## 2025-02-04 RX ORDER — ONDANSETRON 4 MG/1
4 TABLET, FILM COATED ORAL EVERY 8 HOURS PRN
Qty: 12 TABLET | Refills: 0 | Status: SHIPPED | OUTPATIENT
Start: 2025-02-04 | End: 2025-02-09

## 2025-02-04 RX ORDER — CLONIDINE HYDROCHLORIDE 0.1 MG/1
0.1 TABLET ORAL 2 TIMES DAILY
COMMUNITY

## 2025-02-04 RX ORDER — DICYCLOMINE HYDROCHLORIDE 10 MG/1
10 CAPSULE ORAL 4 TIMES DAILY PRN
Qty: 60 CAPSULE | Refills: 0 | Status: SHIPPED | OUTPATIENT
Start: 2025-02-04

## 2025-02-04 RX ORDER — METHOCARBAMOL 750 MG/1
750 TABLET, FILM COATED ORAL 4 TIMES DAILY
COMMUNITY

## 2025-02-04 RX ORDER — HALOPERIDOL 5 MG/1
5 TABLET ORAL 4 TIMES DAILY
COMMUNITY

## 2025-02-04 ASSESSMENT — PAIN - FUNCTIONAL ASSESSMENT: PAIN_FUNCTIONAL_ASSESSMENT: NONE - DENIES PAIN

## 2025-02-04 NOTE — DISCHARGE INSTRUCTIONS
You have a viral illness which is likely causing the nausea, vomiting, diarrhea.  This should clear over the next few days.  Your labs are all normal.  You may use Zofran every 8 hours as needed for any nausea or vomiting.  Bentyl every 6-8 hours as needed for any abdominal cramping.  Clear liquid diet today, slowly advance

## 2025-02-05 NOTE — ED PROVIDER NOTES
Independent MARIO Visit.     [facility] EMERGENCY DEPARTMENT  EMERGENCY DEPARTMENT ENCOUNTER      Pt Name: Radha Joseph  MRN: [id number]  Birthdate 1990  Date of evaluation: 2/4/2025  Provider: CAMMIE Atkins CNP  PCP: No primary care provider on file.  Note Started: 7:35 PM EST 2/4/25    CHIEF COMPLAINT       Chief Complaint   Patient presents with    Vomiting     N/V/D started 3 days ago.        HISTORY OF PRESENT ILLNESS: 1 or more Elements   History From: Patient  Limitations to history : None    Radha Joseph is a 34 y.o. female who has a past medical history of bipolar disorder, cerebral palsy, depression presents to the emergency with concern for nausea, vomiting, diarrhea onset 3 days ago.  Patient states that she is having about 3-4 diarrheal stools per day and 2 episodes of emesis daily.  States she only had 1 diarrheal stools a day and 1 episode of emesis because she only ate once today.  She is currently housed in a sober living facility.  Patient states that she has completed her detox from crack cocaine several weeks ago.  No further drug use.  States there are few other people in the household that have been sick with similar symptoms.  Denies any fevers, abdominal pain, chest pain, shortness of breath.  Has not take anything for symptoms.  She is currently asking for food.    Nursing Notes were all reviewed and agreed with or any disagreements were addressed in the HPI.    REVIEW OF SYSTEMS :    Positives and Pertinent negatives as per HPI.     PAST MEDICAL HISTORY/Chronic Conditions Affecting Care    has a past medical history of Bipolar affective (HCC), Cerebral palsy (HCC), Chicken pox, and Depression with suicidal ideation (5/14/2024).     SURGICAL HISTORY     Past Surgical History:   Procedure Laterality Date    ARM SURGERY         CURRENTMEDICATIONS       Discharge Medication List as of 2/4/2025  6:44 PM        CONTINUE these medications which have NOT

## 2025-04-15 ENCOUNTER — APPOINTMENT (OUTPATIENT)
Dept: GENERAL RADIOLOGY | Age: 35
End: 2025-04-15
Payer: COMMERCIAL

## 2025-04-15 ENCOUNTER — HOSPITAL ENCOUNTER (EMERGENCY)
Age: 35
Discharge: HOME | End: 2025-04-15
Attending: EMERGENCY MEDICINE
Payer: COMMERCIAL

## 2025-04-15 VITALS
BODY MASS INDEX: 28.68 KG/M2 | SYSTOLIC BLOOD PRESSURE: 103 MMHG | HEART RATE: 48 BPM | RESPIRATION RATE: 18 BRPM | DIASTOLIC BLOOD PRESSURE: 68 MMHG | WEIGHT: 175 LBS | OXYGEN SATURATION: 95 % | TEMPERATURE: 97.5 F

## 2025-04-15 DIAGNOSIS — R10.9 ABDOMINAL DISCOMFORT: ICD-10-CM

## 2025-04-15 DIAGNOSIS — R11.2 NAUSEA AND VOMITING, UNSPECIFIED VOMITING TYPE: Primary | ICD-10-CM

## 2025-04-15 LAB
ALBUMIN SERPL-MCNC: 4 G/DL (ref 3.5–5.2)
ALBUMIN/GLOB SERPL: 1.2 {RATIO} (ref 1–2.5)
ALP SERPL-CCNC: 64 U/L (ref 35–104)
ALT SERPL-CCNC: 8 U/L (ref 10–35)
ANION GAP SERPL CALCULATED.3IONS-SCNC: 12 MMOL/L (ref 9–16)
AST SERPL-CCNC: 16 U/L (ref 10–35)
BASOPHILS # BLD: 0.09 K/UL (ref 0–0.2)
BASOPHILS NFR BLD: 1 % (ref 0–2)
BILIRUB SERPL-MCNC: 0.2 MG/DL (ref 0–1.2)
BUN SERPL-MCNC: 9 MG/DL (ref 6–20)
CALCIUM SERPL-MCNC: 9.3 MG/DL (ref 8.6–10.4)
CHLORIDE SERPL-SCNC: 104 MMOL/L (ref 98–107)
CO2 SERPL-SCNC: 22 MMOL/L (ref 20–31)
CREAT SERPL-MCNC: 0.5 MG/DL (ref 0.6–0.9)
EOSINOPHIL # BLD: 0.13 K/UL (ref 0–0.44)
EOSINOPHILS RELATIVE PERCENT: 1 % (ref 1–4)
ERYTHROCYTE [DISTWIDTH] IN BLOOD BY AUTOMATED COUNT: 15 % (ref 11.8–14.4)
GFR, ESTIMATED: >90 ML/MIN/1.73M2
GLUCOSE SERPL-MCNC: 130 MG/DL (ref 74–99)
HCG SERPL QL: NEGATIVE
HCT VFR BLD AUTO: 40.9 % (ref 36.3–47.1)
HGB BLD-MCNC: 13.3 G/DL (ref 11.9–15.1)
IMM GRANULOCYTES # BLD AUTO: 0.04 K/UL (ref 0–0.3)
IMM GRANULOCYTES NFR BLD: 0 %
LIPASE SERPL-CCNC: 40 U/L (ref 13–60)
LYMPHOCYTES NFR BLD: 2.06 K/UL (ref 1.1–3.7)
LYMPHOCYTES RELATIVE PERCENT: 21 % (ref 24–43)
MCH RBC QN AUTO: 27.7 PG (ref 25.2–33.5)
MCHC RBC AUTO-ENTMCNC: 32.5 G/DL (ref 28.4–34.8)
MCV RBC AUTO: 85.2 FL (ref 82.6–102.9)
MONOCYTES NFR BLD: 0.77 K/UL (ref 0.1–1.2)
MONOCYTES NFR BLD: 8 % (ref 3–12)
NEUTROPHILS NFR BLD: 69 % (ref 36–65)
NEUTS SEG NFR BLD: 6.74 K/UL (ref 1.5–8.1)
NRBC BLD-RTO: 0 PER 100 WBC
PLATELET # BLD AUTO: 422 K/UL (ref 138–453)
PMV BLD AUTO: 8.4 FL (ref 8.1–13.5)
POTASSIUM SERPL-SCNC: 3.8 MMOL/L (ref 3.7–5.3)
PROT SERPL-MCNC: 7.3 G/DL (ref 6.6–8.7)
RBC # BLD AUTO: 4.8 M/UL (ref 3.95–5.11)
RBC # BLD: ABNORMAL 10*6/UL
SODIUM SERPL-SCNC: 138 MMOL/L (ref 136–145)
WBC OTHER # BLD: 9.8 K/UL (ref 3.5–11.3)

## 2025-04-15 PROCEDURE — 93005 ELECTROCARDIOGRAM TRACING: CPT | Performed by: EMERGENCY MEDICINE

## 2025-04-15 PROCEDURE — 96361 HYDRATE IV INFUSION ADD-ON: CPT | Performed by: EMERGENCY MEDICINE

## 2025-04-15 PROCEDURE — 71045 X-RAY EXAM CHEST 1 VIEW: CPT

## 2025-04-15 PROCEDURE — 85025 COMPLETE CBC W/AUTO DIFF WBC: CPT

## 2025-04-15 PROCEDURE — 83690 ASSAY OF LIPASE: CPT

## 2025-04-15 PROCEDURE — 96374 THER/PROPH/DIAG INJ IV PUSH: CPT | Performed by: EMERGENCY MEDICINE

## 2025-04-15 PROCEDURE — 99284 EMERGENCY DEPT VISIT MOD MDM: CPT | Performed by: EMERGENCY MEDICINE

## 2025-04-15 PROCEDURE — 2580000003 HC RX 258

## 2025-04-15 PROCEDURE — 80053 COMPREHEN METABOLIC PANEL: CPT

## 2025-04-15 PROCEDURE — 6360000002 HC RX W HCPCS

## 2025-04-15 PROCEDURE — 84703 CHORIONIC GONADOTROPIN ASSAY: CPT

## 2025-04-15 PROCEDURE — 96375 TX/PRO/DX INJ NEW DRUG ADDON: CPT | Performed by: EMERGENCY MEDICINE

## 2025-04-15 RX ORDER — ONDANSETRON 2 MG/ML
4 INJECTION INTRAMUSCULAR; INTRAVENOUS ONCE
Status: COMPLETED | OUTPATIENT
Start: 2025-04-15 | End: 2025-04-15

## 2025-04-15 RX ORDER — KETOROLAC TROMETHAMINE 30 MG/ML
30 INJECTION, SOLUTION INTRAMUSCULAR; INTRAVENOUS ONCE
Status: COMPLETED | OUTPATIENT
Start: 2025-04-15 | End: 2025-04-15

## 2025-04-15 RX ORDER — ONDANSETRON 4 MG/1
4 TABLET, ORALLY DISINTEGRATING ORAL 3 TIMES DAILY PRN
Qty: 21 TABLET | Refills: 0 | Status: SHIPPED | OUTPATIENT
Start: 2025-04-15

## 2025-04-15 RX ORDER — 0.9 % SODIUM CHLORIDE 0.9 %
1000 INTRAVENOUS SOLUTION INTRAVENOUS ONCE
Status: COMPLETED | OUTPATIENT
Start: 2025-04-15 | End: 2025-04-15

## 2025-04-15 RX ADMIN — KETOROLAC TROMETHAMINE 30 MG: 30 INJECTION, SOLUTION INTRAMUSCULAR at 10:17

## 2025-04-15 RX ADMIN — SODIUM CHLORIDE 1000 ML: 0.9 INJECTION, SOLUTION INTRAVENOUS at 10:20

## 2025-04-15 RX ADMIN — FAMOTIDINE 20 MG: 10 INJECTION, SOLUTION INTRAVENOUS at 10:16

## 2025-04-15 RX ADMIN — ONDANSETRON 4 MG: 2 INJECTION, SOLUTION INTRAMUSCULAR; INTRAVENOUS at 10:15

## 2025-04-15 ASSESSMENT — PAIN DESCRIPTION - LOCATION: LOCATION: ABDOMEN;THROAT

## 2025-04-15 ASSESSMENT — PAIN SCALES - GENERAL: PAINLEVEL_OUTOF10: 8

## 2025-04-15 ASSESSMENT — PAIN DESCRIPTION - DESCRIPTORS: DESCRIPTORS: ACHING

## 2025-04-15 NOTE — ED PROVIDER NOTES
[facility] EMERGENCY DEPARTMENT     Emergency Department     Faculty Attestation    I performed a history and physical examination of the patient and discussed management with the resident. I reviewed the resident’s note and agree with the documented findings and plan of care. Any areas of disagreement are noted on the chart. I was personally present for the key portions of any procedures. I have documented in the chart those procedures where I was not present during the key portions. I have reviewed the emergency nurses triage note. I agree with the chief complaint, past medical history, past surgical history, allergies, medications, social and family history as documented unless otherwise noted below. For Physician Assistant/ Nurse Practitioner cases/documentation I have personally evaluated this patient and have completed at least one if not all key elements of the E/M (history, physical exam, and MDM). Additional findings are as noted.    Note Started: 9:54 AM EDT    Patient with vomiting not feeling well.  States began last night at a party.  No recent illnesses no diarrhea significant other who accompanies her provides independent history is not sick no vomiting.  States this morning she just did not feel well did fall to her knees no head injury no loss of consciousness.  Some radiation of the epigastric pain into her chest but no back pain no shortness of breath.  On exam patient appears uncomfortable but nontoxic.  Lungs clear and equal heart regular abdomen soft no focal tenderness just mild diffuse tenderness.  Abrasions bilateral knees but no deformity distally intact.  Will check labs imaging reevaluate probable discharge    EKG interpretation: Sinus bradycardia 49 normal intervals normal axis no acute ST or T changes no acute findings      Critical Care     none    Dg Welch MD, FACEP  Attending Emergency  Physician           Dg Welch MD  04/15/25 0825

## 2025-04-15 NOTE — DISCHARGE INSTRUCTIONS
You were evaluated in the emergency department for abdominal discomfort, nausea and vomiting.  Your labs were all normal.  During this visit you received IV hydration, an antacid, antinausea medication and an anti-inflammatory medication.  Pregnancy test was negative.    Recommend staying well-hydrated drinking plenty of fluids.    Return to department immediately for worsening of your symptoms

## 2025-04-15 NOTE — ED TRIAGE NOTES
33 yo female arrived to ED through triage with multiple complaints.  Patient states she had a fall this morning when she was feeling dizzy.  Patient states she is having abdominal pain that radiates to throat along with vomiting.   Patient also states she was at a party last night and tried Suboxone strips for the first time.  Patient is alert and oriented times 4, speaking full sentence, and answering questions appropriately

## 2025-04-15 NOTE — ED PROVIDER NOTES
[facility] EMERGENCY DEPARTMENT  Emergency Department Encounter  Emergency Medicine Resident     Pt Name:Radha Joseph  MRN: [id number]  Birthdate 1990  Date of evaluation: 4/15/25  PCP:  No primary care provider on file.  Note Started: 9:55 AM EDT      CHIEF COMPLAINT       Chief Complaint   Patient presents with    Dizziness    Vomiting       HISTORY OF PRESENT ILLNESS  (Location/Symptom, Timing/Onset, Context/Setting, Quality, Duration, Modifying Factors, Severity.)      Radha Joseph is a 34 y.o. female with history of bipolar disorder, cerebral palsy and depression presents with generalized abdominal discomfort, nausea, to report episodes of vomiting.  Patient states last night she was at a party when she took one of her friends Suboxone.  She states she immediately began experiencing abdominal discomfort and nausea.  She vomited twice prior to arrival.  She has generalized abdominal discomfort with radiation into the chest and all the way up to her throat.  She denies any overt chest pain, diaphoresis or palpitations.  She denies urinary symptoms or blood in urine.  No reported vaginal bleeding or discharge. Denies diarrhea or blood in stool.     PAST MEDICAL / SURGICAL / SOCIAL / FAMILY HISTORY      has a past medical history of Bipolar affective (HCC), Cerebral palsy (HCC), Chicken pox, and Depression with suicidal ideation.       has a past surgical history that includes Arm Surgery.      Social History     Socioeconomic History    Marital status: Single     Spouse name: Not on file    Number of children: 1    Years of education: 14    Highest education level: Not on file   Occupational History    Not on file   Tobacco Use    Smoking status: Former     Current packs/day: 0.00     Average packs/day: 0.5 packs/day for 15.0 years (7.5 ttl pk-yrs)     Types: Cigarettes     Start date: 2004     Quit date: 2019     Years since quittin.9    Smokeless tobacco: Never   Vaping Use

## 2025-04-16 ENCOUNTER — HOSPITAL ENCOUNTER (EMERGENCY)
Age: 35
Discharge: HOME | End: 2025-04-16
Attending: EMERGENCY MEDICINE
Payer: COMMERCIAL

## 2025-04-16 ENCOUNTER — APPOINTMENT (OUTPATIENT)
Dept: GENERAL RADIOLOGY | Age: 35
End: 2025-04-16
Payer: COMMERCIAL

## 2025-04-16 VITALS
RESPIRATION RATE: 18 BRPM | DIASTOLIC BLOOD PRESSURE: 72 MMHG | HEART RATE: 79 BPM | OXYGEN SATURATION: 97 % | SYSTOLIC BLOOD PRESSURE: 122 MMHG | TEMPERATURE: 98.2 F

## 2025-04-16 DIAGNOSIS — M79.642 LEFT HAND PAIN: Primary | ICD-10-CM

## 2025-04-16 DIAGNOSIS — S62.339A CLOSED BOXER'S FRACTURE, INITIAL ENCOUNTER: ICD-10-CM

## 2025-04-16 LAB
EKG ATRIAL RATE: 49 BPM
EKG P AXIS: 57 DEGREES
EKG P-R INTERVAL: 180 MS
EKG Q-T INTERVAL: 450 MS
EKG QRS DURATION: 86 MS
EKG QTC CALCULATION (BAZETT): 406 MS
EKG R AXIS: 50 DEGREES
EKG T AXIS: 69 DEGREES
EKG VENTRICULAR RATE: 49 BPM

## 2025-04-16 PROCEDURE — 73130 X-RAY EXAM OF HAND: CPT

## 2025-04-16 PROCEDURE — 99285 EMERGENCY DEPT VISIT HI MDM: CPT

## 2025-04-16 ASSESSMENT — PAIN - FUNCTIONAL ASSESSMENT: PAIN_FUNCTIONAL_ASSESSMENT: NONE - DENIES PAIN

## 2025-04-16 NOTE — ED PROVIDER NOTES
EMERGENCY DEPARTMENT ENCOUNTER    Pt Name: Radha Joseph  MRN: [id number]  Birthdate 1990  Date of evaluation: 4/16/25  CHIEF COMPLAINT       Chief Complaint   Patient presents with    Hand Pain     HISTORY OF PRESENT ILLNESS   The history is provided by the patient and medical records.  The patient is a 34-year-old female with history of cerebral palsy, bipolar, depression, and is homeless who presents to the ED for pain and bruising to left hand.  She reports injuring it approximate 1 week ago.  Bruising is not resolved.  As of yet, she has not received an x-ray of her hand.  Pain is tolerable.  No other injuries reported.    REVIEW OF SYSTEMS     Review of Systems  All other systems reviewed and are negative.    PASTMEDICAL HISTORY     Past Medical History:   Diagnosis Date    Bipolar affective (HCC)     Cerebral palsy ([facility])     Chicken pox     Depression with suicidal ideation 5/14/2024     Past Problem List  Patient Active Problem List   Diagnosis Code    Other schizophrenia F20.89    Flexural eczema L20.82    Toenail fungus B35.1    Schizoaffective disorder, bipolar type ([facility]) F25.0    Depression with suicidal ideation F32.A, R45.851    Bipolar I disorder, most recent episode mixed, severe with psychotic features ([facility]) F31.64     SURGICAL HISTORY       Past Surgical History:   Procedure Laterality Date    ARM SURGERY       CURRENT MEDICATIONS       Previous Medications    CLONIDINE (CATAPRES) 0.1 MG TABLET    Take 1 tablet by mouth 2 times daily    DICYCLOMINE (BENTYL) 10 MG CAPSULE    Take 1 capsule by mouth 4 times daily as needed (diarrhea, abdominal cramping)    FERROUS SULFATE (IRON 325) 325 (65 FE) MG TABLET    Take 1 tablet by mouth 2 times daily (with meals)    HALOPERIDOL (HALDOL) 5 MG TABLET    Take 1 tablet by mouth 4 times daily    METHOCARBAMOL (ROBAXIN) 750 MG TABLET    Take 1 tablet by mouth 4 times daily    OLANZAPINE (ZYPREXA) 10 MG TABLET    Take 1 tablet by mouth nightly    ONDANSETRON

## 2025-04-16 NOTE — ED NOTES
Writer went over protocol with suicidal patient, pt states \"that sounds amazing, it's better than being in the cold\". Dr. Duckworth notified, and spoke with patient.

## 2025-06-26 ENCOUNTER — APPOINTMENT (OUTPATIENT)
Dept: GENERAL RADIOLOGY | Age: 35
DRG: 917 | End: 2025-06-26
Payer: COMMERCIAL

## 2025-06-26 ENCOUNTER — HOSPITAL ENCOUNTER (INPATIENT)
Age: 35
LOS: 1 days | Discharge: HOME | DRG: 917 | End: 2025-06-28
Attending: EMERGENCY MEDICINE | Admitting: STUDENT IN AN ORGANIZED HEALTH CARE EDUCATION/TRAINING PROGRAM
Payer: COMMERCIAL

## 2025-06-26 DIAGNOSIS — T50.901A ACCIDENTAL OVERDOSE, INITIAL ENCOUNTER: Primary | ICD-10-CM

## 2025-06-26 LAB
ALBUMIN SERPL-MCNC: 3.7 G/DL (ref 3.5–5.2)
ALBUMIN/GLOB SERPL: 1.2 {RATIO} (ref 1–2.5)
ALP SERPL-CCNC: 63 U/L (ref 35–104)
ALT SERPL-CCNC: 9 U/L (ref 10–35)
ANION GAP SERPL CALCULATED.3IONS-SCNC: 20 MMOL/L (ref 9–16)
APAP SERPL-MCNC: <5 UG/ML (ref 10–30)
AST SERPL-CCNC: 19 U/L (ref 10–35)
BILIRUB SERPL-MCNC: 0.2 MG/DL (ref 0–1.2)
BUN SERPL-MCNC: 12 MG/DL (ref 6–20)
CALCIUM SERPL-MCNC: 8.6 MG/DL (ref 8.6–10.4)
CHLORIDE SERPL-SCNC: 104 MMOL/L (ref 98–107)
CO2 SERPL-SCNC: 15 MMOL/L (ref 20–31)
CREAT SERPL-MCNC: 1 MG/DL (ref 0.6–0.9)
ETHANOL PERCENT: <0.01 %
ETHANOLAMINE SERPL-MCNC: <10 MG/DL (ref 0–0.08)
GFR, ESTIMATED: 76 ML/MIN/1.73M2
GLUCOSE SERPL-MCNC: 322 MG/DL (ref 74–99)
POTASSIUM SERPL-SCNC: 4.1 MMOL/L (ref 3.7–5.3)
PROT SERPL-MCNC: 6.8 G/DL (ref 6.6–8.7)
SALICYLATES SERPL-MCNC: <0.5 MG/DL (ref 0–10)
SODIUM SERPL-SCNC: 139 MMOL/L (ref 136–145)
TROPONIN I SERPL HS-MCNC: 18 NG/L (ref 0–14)

## 2025-06-26 PROCEDURE — 84484 ASSAY OF TROPONIN QUANT: CPT

## 2025-06-26 PROCEDURE — G0480 DRUG TEST DEF 1-7 CLASSES: HCPCS

## 2025-06-26 PROCEDURE — 99285 EMERGENCY DEPT VISIT HI MDM: CPT

## 2025-06-26 PROCEDURE — 85025 COMPLETE CBC W/AUTO DIFF WBC: CPT

## 2025-06-26 PROCEDURE — 80179 DRUG ASSAY SALICYLATE: CPT

## 2025-06-26 PROCEDURE — 93005 ELECTROCARDIOGRAM TRACING: CPT | Performed by: STUDENT IN AN ORGANIZED HEALTH CARE EDUCATION/TRAINING PROGRAM

## 2025-06-26 PROCEDURE — 80053 COMPREHEN METABOLIC PANEL: CPT

## 2025-06-26 PROCEDURE — 84703 CHORIONIC GONADOTROPIN ASSAY: CPT

## 2025-06-26 PROCEDURE — 96375 TX/PRO/DX INJ NEW DRUG ADDON: CPT

## 2025-06-26 PROCEDURE — 71045 X-RAY EXAM CHEST 1 VIEW: CPT

## 2025-06-26 PROCEDURE — 2580000003 HC RX 258

## 2025-06-26 PROCEDURE — 80143 DRUG ASSAY ACETAMINOPHEN: CPT

## 2025-06-26 PROCEDURE — 96361 HYDRATE IV INFUSION ADD-ON: CPT

## 2025-06-26 PROCEDURE — 6360000002 HC RX W HCPCS

## 2025-06-26 RX ORDER — ONDANSETRON 2 MG/ML
4 INJECTION INTRAMUSCULAR; INTRAVENOUS ONCE
Status: COMPLETED | OUTPATIENT
Start: 2025-06-26 | End: 2025-06-26

## 2025-06-26 RX ORDER — 0.9 % SODIUM CHLORIDE 0.9 %
1000 INTRAVENOUS SOLUTION INTRAVENOUS ONCE
Status: COMPLETED | OUTPATIENT
Start: 2025-06-26 | End: 2025-06-27

## 2025-06-26 RX ADMIN — ONDANSETRON 4 MG: 2 INJECTION, SOLUTION INTRAMUSCULAR; INTRAVENOUS at 23:25

## 2025-06-26 RX ADMIN — SODIUM CHLORIDE 1000 ML: 9 INJECTION, SOLUTION INTRAVENOUS at 23:27

## 2025-06-27 ENCOUNTER — APPOINTMENT (OUTPATIENT)
Age: 35
DRG: 917 | End: 2025-06-27
Payer: COMMERCIAL

## 2025-06-27 ENCOUNTER — APPOINTMENT (OUTPATIENT)
Dept: CT IMAGING | Age: 35
DRG: 917 | End: 2025-06-27
Payer: COMMERCIAL

## 2025-06-27 PROBLEM — T40.2X1A OPIOID OVERDOSE (HCC): Status: ACTIVE | Noted: 2025-06-27

## 2025-06-27 PROBLEM — R00.1 SINUS BRADYCARDIA: Status: ACTIVE | Noted: 2025-06-27

## 2025-06-27 PROBLEM — A41.9 SEPSIS (HCC): Status: ACTIVE | Noted: 2025-06-27

## 2025-06-27 PROBLEM — T50.901A ACCIDENTAL OVERDOSE: Status: ACTIVE | Noted: 2025-06-27

## 2025-06-27 PROBLEM — I21.4 NSTEMI (NON-ST ELEVATED MYOCARDIAL INFARCTION) (HCC): Status: ACTIVE | Noted: 2025-06-27

## 2025-06-27 LAB
AMPHET UR QL SCN: NEGATIVE
B PARAP IS1001 DNA NPH QL NAA+NON-PROBE: NOT DETECTED
B PERT DNA SPEC QL NAA+PROBE: NOT DETECTED
B-OH-BUTYR SERPL-MCNC: 0.06 MMOL/L (ref 0.02–0.27)
BACTERIA URNS QL MICRO: NORMAL
BARBITURATES UR QL SCN: NEGATIVE
BASOPHILS # BLD: 0 K/UL (ref 0–0.2)
BASOPHILS NFR BLD: 0 % (ref 0–2)
BENZODIAZ UR QL: NEGATIVE
BILIRUB UR QL STRIP: NEGATIVE
BODY TEMPERATURE: 37
BODY TEMPERATURE: 37
C PNEUM DNA NPH QL NAA+NON-PROBE: NOT DETECTED
CANNABINOIDS UR QL SCN: NEGATIVE
CASTS #/AREA URNS LPF: NORMAL /LPF (ref 0–8)
CHP ED QC CHECK: YES
CLARITY UR: ABNORMAL
COCAINE UR QL SCN: POSITIVE
COHGB MFR BLD: 4 % (ref 0–5)
COHGB MFR BLD: 6.2 % (ref 0–5)
COLOR UR: YELLOW
ECHO AO ROOT DIAM: 2.5 CM
ECHO AO ROOT INDEX: 1.34 CM/M2
ECHO AV AREA PEAK VELOCITY: 3.4 CM2
ECHO AV AREA VTI: 3.2 CM2
ECHO AV AREA/BSA PEAK VELOCITY: 1.8 CM2/M2
ECHO AV AREA/BSA VTI: 1.7 CM2/M2
ECHO AV MEAN GRADIENT: 3 MMHG
ECHO AV MEAN VELOCITY: 0.9 M/S
ECHO AV PEAK GRADIENT: 8 MMHG
ECHO AV PEAK VELOCITY: 1.4 M/S
ECHO AV VELOCITY RATIO: 1.07
ECHO AV VTI: 28.9 CM
ECHO BSA: 1.88 M2
ECHO EST RA PRESSURE: 3 MMHG
ECHO LA AREA 2C: 20.2 CM2
ECHO LA AREA 4C: 20.8 CM2
ECHO LA DIAMETER INDEX: 1.77 CM/M2
ECHO LA DIAMETER: 3.3 CM
ECHO LA MAJOR AXIS: 5.8 CM
ECHO LA MINOR AXIS: 5.2 CM
ECHO LA TO AORTIC ROOT RATIO: 1.32
ECHO LA VOL BP: 63 ML (ref 22–52)
ECHO LA VOL MOD A2C: 64 ML (ref 22–52)
ECHO LA VOL MOD A4C: 56 ML (ref 22–52)
ECHO LA VOL/BSA BIPLANE: 34 ML/M2 (ref 16–34)
ECHO LA VOLUME INDEX MOD A2C: 34 ML/M2 (ref 16–34)
ECHO LA VOLUME INDEX MOD A4C: 30 ML/M2 (ref 16–34)
ECHO LV E' LATERAL VELOCITY: 15.7 CM/S
ECHO LV E' SEPTAL VELOCITY: 12.2 CM/S
ECHO LV EDV A2C: 58 ML
ECHO LV EDV A4C: 52 ML
ECHO LV EDV INDEX A4C: 28 ML/M2
ECHO LV EDV NDEX A2C: 31 ML/M2
ECHO LV EF PHYSICIAN: 65 %
ECHO LV EJECTION FRACTION A2C: 65 %
ECHO LV EJECTION FRACTION A4C: 66 %
ECHO LV EJECTION FRACTION BIPLANE: 65 % (ref 55–100)
ECHO LV ESV A2C: 21 ML
ECHO LV ESV A4C: 18 ML
ECHO LV ESV INDEX A2C: 11 ML/M2
ECHO LV ESV INDEX A4C: 10 ML/M2
ECHO LV FRACTIONAL SHORTENING: 32 % (ref 28–44)
ECHO LV INTERNAL DIMENSION DIASTOLE INDEX: 2.2 CM/M2
ECHO LV INTERNAL DIMENSION DIASTOLIC: 4.1 CM (ref 3.9–5.3)
ECHO LV INTERNAL DIMENSION SYSTOLIC INDEX: 1.51 CM/M2
ECHO LV INTERNAL DIMENSION SYSTOLIC: 2.8 CM
ECHO LV IVSD: 0.9 CM (ref 0.6–0.9)
ECHO LV MASS 2D: 114.1 G (ref 67–162)
ECHO LV MASS INDEX 2D: 61.4 G/M2 (ref 43–95)
ECHO LV POSTERIOR WALL DIASTOLIC: 0.9 CM (ref 0.6–0.9)
ECHO LV RELATIVE WALL THICKNESS RATIO: 0.44
ECHO LVOT AREA: 3.1 CM2
ECHO LVOT AV VTI INDEX: 1
ECHO LVOT DIAM: 2 CM
ECHO LVOT MEAN GRADIENT: 4 MMHG
ECHO LVOT PEAK GRADIENT: 9 MMHG
ECHO LVOT PEAK VELOCITY: 1.5 M/S
ECHO LVOT STROKE VOLUME INDEX: 49 ML/M2
ECHO LVOT SV: 91.1 ML
ECHO LVOT VTI: 29 CM
ECHO MV A VELOCITY: 0.45 M/S
ECHO MV E DECELERATION TIME (DT): 190 MS
ECHO MV E VELOCITY: 0.99 M/S
ECHO MV E/A RATIO: 2.2
ECHO MV E/E' LATERAL: 6.31
ECHO MV E/E' RATIO (AVERAGED): 7.21
ECHO MV E/E' SEPTAL: 8.11
ECHO PV MAX VELOCITY: 1 M/S
ECHO PV PEAK GRADIENT: 4 MMHG
ECHO RV BASAL DIMENSION: 3.2 CM
ECHO RV FREE WALL PEAK S': 14.6 CM/S
ECHO RV TAPSE: 2.2 CM (ref 1.7–?)
EKG ATRIAL RATE: 63 BPM
EKG ATRIAL RATE: 98 BPM
EKG P AXIS: 30 DEGREES
EKG P AXIS: 74 DEGREES
EKG P-R INTERVAL: 168 MS
EKG P-R INTERVAL: 168 MS
EKG Q-T INTERVAL: 372 MS
EKG Q-T INTERVAL: 418 MS
EKG QRS DURATION: 88 MS
EKG QRS DURATION: 92 MS
EKG QTC CALCULATION (BAZETT): 427 MS
EKG QTC CALCULATION (BAZETT): 474 MS
EKG R AXIS: 75 DEGREES
EKG R AXIS: 84 DEGREES
EKG T AXIS: 54 DEGREES
EKG T AXIS: 64 DEGREES
EKG VENTRICULAR RATE: 63 BPM
EKG VENTRICULAR RATE: 98 BPM
EOSINOPHIL # BLD: 0.24 K/UL (ref 0–0.4)
EOSINOPHILS RELATIVE PERCENT: 1 % (ref 1–4)
EPI CELLS #/AREA URNS HPF: NORMAL /HPF (ref 0–5)
ERYTHROCYTE [DISTWIDTH] IN BLOOD BY AUTOMATED COUNT: 14.5 % (ref 11.8–14.4)
FENTANYL UR QL: POSITIVE
FIO2 ON VENT: ABNORMAL %
FIO2 ON VENT: ABNORMAL %
FLUAV RNA NPH QL NAA+NON-PROBE: NOT DETECTED
FLUBV RNA NPH QL NAA+NON-PROBE: NOT DETECTED
GLUCOSE BLD-MCNC: 83 MG/DL
GLUCOSE BLD-MCNC: 83 MG/DL (ref 65–105)
GLUCOSE UR STRIP-MCNC: ABNORMAL MG/DL
HADV DNA NPH QL NAA+NON-PROBE: NOT DETECTED
HCG SERPL QL: NEGATIVE
HCO3 VENOUS: 22.2 MMOL/L (ref 24–30)
HCO3 VENOUS: 23.8 MMOL/L (ref 24–30)
HCOV 229E RNA NPH QL NAA+NON-PROBE: NOT DETECTED
HCOV HKU1 RNA NPH QL NAA+NON-PROBE: NOT DETECTED
HCOV NL63 RNA NPH QL NAA+NON-PROBE: NOT DETECTED
HCOV OC43 RNA NPH QL NAA+NON-PROBE: NOT DETECTED
HCT VFR BLD AUTO: 42 % (ref 36.3–47.1)
HGB BLD-MCNC: 13 G/DL (ref 11.9–15.1)
HGB UR QL STRIP.AUTO: ABNORMAL
HMPV RNA NPH QL NAA+NON-PROBE: NOT DETECTED
HPIV1 RNA NPH QL NAA+NON-PROBE: NOT DETECTED
HPIV2 RNA NPH QL NAA+NON-PROBE: NOT DETECTED
HPIV3 RNA NPH QL NAA+NON-PROBE: NOT DETECTED
HPIV4 RNA NPH QL NAA+NON-PROBE: NOT DETECTED
IMM GRANULOCYTES # BLD AUTO: 0.48 K/UL (ref 0–0.3)
IMM GRANULOCYTES NFR BLD: 2 %
KETONES UR STRIP-MCNC: ABNORMAL MG/DL
LACTIC ACID, SEPSIS WHOLE BLOOD: 1.2 MMOL/L (ref 0.5–1.9)
LACTIC ACID, WHOLE BLOOD: 1 MMOL/L (ref 0.7–2.1)
LACTIC ACID, WHOLE BLOOD: 4.6 MMOL/L (ref 0.7–2.1)
LEUKOCYTE ESTERASE UR QL STRIP: NEGATIVE
LYMPHOCYTES NFR BLD: 2.14 K/UL (ref 1–4.8)
LYMPHOCYTES RELATIVE PERCENT: 9 % (ref 24–44)
M PNEUMO DNA NPH QL NAA+NON-PROBE: NOT DETECTED
MCH RBC QN AUTO: 28.8 PG (ref 25.2–33.5)
MCHC RBC AUTO-ENTMCNC: 31 G/DL (ref 28.4–34.8)
MCV RBC AUTO: 92.9 FL (ref 82.6–102.9)
METHADONE UR QL: NEGATIVE
MONOCYTES NFR BLD: 0.24 K/UL (ref 0.1–0.8)
MONOCYTES NFR BLD: 1 % (ref 1–7)
MORPHOLOGY: ABNORMAL
NEGATIVE BASE EXCESS, VEN: 1.4 MMOL/L (ref 0–2)
NEGATIVE BASE EXCESS, VEN: 7.2 MMOL/L (ref 0–2)
NEUTROPHILS NFR BLD: 87 % (ref 36–66)
NEUTS SEG NFR BLD: 20.7 K/UL (ref 1.8–7.7)
NITRITE UR QL STRIP: NEGATIVE
NRBC BLD-RTO: 0 PER 100 WBC
O2 SAT, VEN: 60 % (ref 60–85)
O2 SAT, VEN: 99.1 % (ref 60–85)
OPIATES UR QL SCN: NEGATIVE
OXYCODONE UR QL SCN: NEGATIVE
PCO2 VENOUS: 42.1 MM HG (ref 39–55)
PCO2 VENOUS: 61.7 MM HG (ref 39–55)
PCP UR QL SCN: NEGATIVE
PH UR STRIP: 6 [PH] (ref 5–8)
PH VENOUS: 7.17 (ref 7.32–7.42)
PH VENOUS: 7.37 (ref 7.32–7.42)
PLATELET # BLD AUTO: 373 K/UL (ref 138–453)
PMV BLD AUTO: 8.6 FL (ref 8.1–13.5)
PO2 VENOUS: 170.9 MM HG (ref 30–50)
PO2 VENOUS: 39.4 MM HG (ref 30–50)
PROT UR STRIP-MCNC: ABNORMAL MG/DL
RBC # BLD AUTO: 4.52 M/UL (ref 3.95–5.11)
RBC #/AREA URNS HPF: NORMAL /HPF (ref 0–4)
RSV RNA NPH QL NAA+NON-PROBE: NOT DETECTED
RV+EV RNA NPH QL NAA+NON-PROBE: NOT DETECTED
SARS-COV-2 RNA NPH QL NAA+NON-PROBE: NOT DETECTED
SP GR UR STRIP: 1.03 (ref 1–1.03)
SPECIMEN DESCRIPTION: NORMAL
TEST INFORMATION: ABNORMAL
TROPONIN I SERPL HS-MCNC: 31 NG/L (ref 0–14)
TROPONIN I SERPL HS-MCNC: 37 NG/L (ref 0–14)
UROBILINOGEN UR STRIP-ACNC: NORMAL EU/DL (ref 0–1)
WBC #/AREA URNS HPF: NORMAL /HPF (ref 0–5)
WBC OTHER # BLD: 23.8 K/UL (ref 3.5–11.3)

## 2025-06-27 PROCEDURE — 6360000002 HC RX W HCPCS: Performed by: NURSE PRACTITIONER

## 2025-06-27 PROCEDURE — 6360000002 HC RX W HCPCS

## 2025-06-27 PROCEDURE — 93306 TTE W/DOPPLER COMPLETE: CPT

## 2025-06-27 PROCEDURE — 83605 ASSAY OF LACTIC ACID: CPT

## 2025-06-27 PROCEDURE — 93005 ELECTROCARDIOGRAM TRACING: CPT

## 2025-06-27 PROCEDURE — 71250 CT THORAX DX C-: CPT

## 2025-06-27 PROCEDURE — 2580000003 HC RX 258: Performed by: NURSE PRACTITIONER

## 2025-06-27 PROCEDURE — 2000000000 HC ICU R&B

## 2025-06-27 PROCEDURE — 99291 CRITICAL CARE FIRST HOUR: CPT | Performed by: STUDENT IN AN ORGANIZED HEALTH CARE EDUCATION/TRAINING PROGRAM

## 2025-06-27 PROCEDURE — 2580000003 HC RX 258

## 2025-06-27 PROCEDURE — 82947 ASSAY GLUCOSE BLOOD QUANT: CPT

## 2025-06-27 PROCEDURE — 99222 1ST HOSP IP/OBS MODERATE 55: CPT | Performed by: STUDENT IN AN ORGANIZED HEALTH CARE EDUCATION/TRAINING PROGRAM

## 2025-06-27 PROCEDURE — 81001 URINALYSIS AUTO W/SCOPE: CPT

## 2025-06-27 PROCEDURE — 6370000000 HC RX 637 (ALT 250 FOR IP): Performed by: NURSE PRACTITIONER

## 2025-06-27 PROCEDURE — 6370000000 HC RX 637 (ALT 250 FOR IP)

## 2025-06-27 PROCEDURE — 87040 BLOOD CULTURE FOR BACTERIA: CPT

## 2025-06-27 PROCEDURE — 2580000003 HC RX 258: Performed by: STUDENT IN AN ORGANIZED HEALTH CARE EDUCATION/TRAINING PROGRAM

## 2025-06-27 PROCEDURE — 82805 BLOOD GASES W/O2 SATURATION: CPT

## 2025-06-27 PROCEDURE — 93010 ELECTROCARDIOGRAM REPORT: CPT | Performed by: INTERNAL MEDICINE

## 2025-06-27 PROCEDURE — 87641 MR-STAPH DNA AMP PROBE: CPT

## 2025-06-27 PROCEDURE — 82010 KETONE BODYS QUAN: CPT

## 2025-06-27 PROCEDURE — 36415 COLL VENOUS BLD VENIPUNCTURE: CPT

## 2025-06-27 PROCEDURE — 6360000002 HC RX W HCPCS: Performed by: STUDENT IN AN ORGANIZED HEALTH CARE EDUCATION/TRAINING PROGRAM

## 2025-06-27 PROCEDURE — 96361 HYDRATE IV INFUSION ADD-ON: CPT

## 2025-06-27 PROCEDURE — 84484 ASSAY OF TROPONIN QUANT: CPT

## 2025-06-27 PROCEDURE — 96366 THER/PROPH/DIAG IV INF ADDON: CPT

## 2025-06-27 PROCEDURE — 99222 1ST HOSP IP/OBS MODERATE 55: CPT | Performed by: INTERNAL MEDICINE

## 2025-06-27 PROCEDURE — 93306 TTE W/DOPPLER COMPLETE: CPT | Performed by: INTERNAL MEDICINE

## 2025-06-27 PROCEDURE — 0202U NFCT DS 22 TRGT SARS-COV-2: CPT

## 2025-06-27 PROCEDURE — 80307 DRUG TEST PRSMV CHEM ANLYZR: CPT

## 2025-06-27 PROCEDURE — 96365 THER/PROPH/DIAG IV INF INIT: CPT

## 2025-06-27 RX ORDER — 0.9 % SODIUM CHLORIDE 0.9 %
1000 INTRAVENOUS SOLUTION INTRAVENOUS ONCE
Status: DISCONTINUED | OUTPATIENT
Start: 2025-06-27 | End: 2025-06-27

## 2025-06-27 RX ORDER — SODIUM CHLORIDE 0.9 % (FLUSH) 0.9 %
5-40 SYRINGE (ML) INJECTION EVERY 12 HOURS SCHEDULED
Status: DISCONTINUED | OUTPATIENT
Start: 2025-06-27 | End: 2025-06-28 | Stop reason: HOSPADM

## 2025-06-27 RX ORDER — 0.9 % SODIUM CHLORIDE 0.9 %
500 INTRAVENOUS SOLUTION INTRAVENOUS ONCE
Status: COMPLETED | OUTPATIENT
Start: 2025-06-27 | End: 2025-06-27

## 2025-06-27 RX ORDER — OLANZAPINE 10 MG/1
10 TABLET, FILM COATED ORAL NIGHTLY
Status: DISCONTINUED | OUTPATIENT
Start: 2025-06-27 | End: 2025-06-28 | Stop reason: HOSPADM

## 2025-06-27 RX ORDER — ACETAMINOPHEN 325 MG/1
650 TABLET ORAL EVERY 6 HOURS PRN
Status: DISCONTINUED | OUTPATIENT
Start: 2025-06-27 | End: 2025-06-28 | Stop reason: HOSPADM

## 2025-06-27 RX ORDER — ONDANSETRON 2 MG/ML
4 INJECTION INTRAMUSCULAR; INTRAVENOUS ONCE
Status: CANCELLED | OUTPATIENT
Start: 2025-06-27 | End: 2025-06-27

## 2025-06-27 RX ORDER — ACETAMINOPHEN 650 MG/1
650 SUPPOSITORY RECTAL EVERY 6 HOURS PRN
Status: DISCONTINUED | OUTPATIENT
Start: 2025-06-27 | End: 2025-06-28 | Stop reason: HOSPADM

## 2025-06-27 RX ORDER — ENOXAPARIN SODIUM 100 MG/ML
40 INJECTION SUBCUTANEOUS DAILY
Status: DISCONTINUED | OUTPATIENT
Start: 2025-06-27 | End: 2025-06-28 | Stop reason: HOSPADM

## 2025-06-27 RX ORDER — BENZONATATE 100 MG/1
100 CAPSULE ORAL 3 TIMES DAILY PRN
Status: DISCONTINUED | OUTPATIENT
Start: 2025-06-27 | End: 2025-06-28 | Stop reason: HOSPADM

## 2025-06-27 RX ORDER — FERROUS SULFATE 325(65) MG
325 TABLET, DELAYED RELEASE (ENTERIC COATED) ORAL 2 TIMES DAILY WITH MEALS
Status: DISCONTINUED | OUTPATIENT
Start: 2025-06-27 | End: 2025-06-28 | Stop reason: HOSPADM

## 2025-06-27 RX ORDER — NALOXONE HYDROCHLORIDE 0.4 MG/ML
INJECTION, SOLUTION INTRAMUSCULAR; INTRAVENOUS; SUBCUTANEOUS
Status: COMPLETED
Start: 2025-06-27 | End: 2025-06-27

## 2025-06-27 RX ORDER — SODIUM CHLORIDE 0.9 % (FLUSH) 0.9 %
5-40 SYRINGE (ML) INJECTION PRN
Status: DISCONTINUED | OUTPATIENT
Start: 2025-06-27 | End: 2025-06-28 | Stop reason: HOSPADM

## 2025-06-27 RX ORDER — ASPIRIN 81 MG/1
324 TABLET, CHEWABLE ORAL ONCE
Status: COMPLETED | OUTPATIENT
Start: 2025-06-27 | End: 2025-06-27

## 2025-06-27 RX ORDER — GUAIFENESIN/DEXTROMETHORPHAN 100-10MG/5
5 SYRUP ORAL EVERY 4 HOURS PRN
Status: DISCONTINUED | OUTPATIENT
Start: 2025-06-27 | End: 2025-06-28 | Stop reason: HOSPADM

## 2025-06-27 RX ORDER — NALOXONE HYDROCHLORIDE 0.4 MG/ML
0.4 INJECTION, SOLUTION INTRAMUSCULAR; INTRAVENOUS; SUBCUTANEOUS PRN
Status: DISCONTINUED | OUTPATIENT
Start: 2025-06-27 | End: 2025-06-28 | Stop reason: HOSPADM

## 2025-06-27 RX ORDER — SODIUM CHLORIDE 9 MG/ML
INJECTION, SOLUTION INTRAVENOUS PRN
Status: DISCONTINUED | OUTPATIENT
Start: 2025-06-27 | End: 2025-06-28 | Stop reason: HOSPADM

## 2025-06-27 RX ORDER — SODIUM CHLORIDE 9 MG/ML
INJECTION, SOLUTION INTRAVENOUS CONTINUOUS
Status: DISCONTINUED | OUTPATIENT
Start: 2025-06-27 | End: 2025-06-28 | Stop reason: HOSPADM

## 2025-06-27 RX ORDER — 0.9 % SODIUM CHLORIDE 0.9 %
30 INTRAVENOUS SOLUTION INTRAVENOUS ONCE
Status: COMPLETED | OUTPATIENT
Start: 2025-06-27 | End: 2025-06-27

## 2025-06-27 RX ADMIN — Medication 1750 MG: at 03:52

## 2025-06-27 RX ADMIN — NALOXONE HYDROCHLORIDE 0.2 MG/HR: 1 INJECTION INTRAMUSCULAR; INTRAVENOUS; SUBCUTANEOUS at 13:45

## 2025-06-27 RX ADMIN — ENOXAPARIN SODIUM 40 MG: 100 INJECTION SUBCUTANEOUS at 10:22

## 2025-06-27 RX ADMIN — PIPERACILLIN AND TAZOBACTAM 3375 MG: 3; .375 INJECTION, POWDER, LYOPHILIZED, FOR SOLUTION INTRAVENOUS at 18:30

## 2025-06-27 RX ADMIN — PIPERACILLIN AND TAZOBACTAM 3375 MG: 3; .375 INJECTION, POWDER, LYOPHILIZED, FOR SOLUTION INTRAVENOUS at 00:38

## 2025-06-27 RX ADMIN — VANCOMYCIN HYDROCHLORIDE 750 MG: 750 INJECTION, POWDER, LYOPHILIZED, FOR SOLUTION INTRAVENOUS at 13:59

## 2025-06-27 RX ADMIN — FERROUS SULFATE TAB EC 325 MG (65 MG FE EQUIVALENT) 325 MG: 325 (65 FE) TABLET DELAYED RESPONSE at 18:28

## 2025-06-27 RX ADMIN — NALXONE HYDROCHLORIDE 0.4 MG: 0.4 INJECTION INTRAMUSCULAR; INTRAVENOUS; SUBCUTANEOUS at 08:46

## 2025-06-27 RX ADMIN — FERROUS SULFATE TAB EC 325 MG (65 MG FE EQUIVALENT) 325 MG: 325 (65 FE) TABLET DELAYED RESPONSE at 10:22

## 2025-06-27 RX ADMIN — SODIUM CHLORIDE 2178 ML: 9 INJECTION, SOLUTION INTRAVENOUS at 03:14

## 2025-06-27 RX ADMIN — NALXONE HYDROCHLORIDE 0.4 MG: 0.4 INJECTION INTRAMUSCULAR; INTRAVENOUS; SUBCUTANEOUS at 12:07

## 2025-06-27 RX ADMIN — SODIUM CHLORIDE: 0.9 INJECTION, SOLUTION INTRAVENOUS at 10:21

## 2025-06-27 RX ADMIN — PIPERACILLIN AND TAZOBACTAM 3375 MG: 3; .375 INJECTION, POWDER, LYOPHILIZED, FOR SOLUTION INTRAVENOUS at 08:53

## 2025-06-27 RX ADMIN — ASPIRIN 324 MG: 81 TABLET, CHEWABLE ORAL at 00:53

## 2025-06-27 RX ADMIN — SODIUM CHLORIDE 500 ML: 0.9 INJECTION, SOLUTION INTRAVENOUS at 08:45

## 2025-06-27 NOTE — ED NOTES
Pt arrives to ED by EMS with c/o drug overdose  Pt states she was smoking crack cocaine from a different dealer when she overdosed  Pt is unsure who called 911  Pt on scene given 4mg Narcan IN and become responsive  Pt vomited on scene  Pt is A&Ox4, even unlabored RR NAD  Pt connected to full cardiac monitor, end title, EKG obtained IV established  Pt resting comfortably on cot with call light within reach

## 2025-06-27 NOTE — H&P
[facility]  Office: [phone]  Angel Luis Thomas DO, Xavier Gavin DO, Andrea Felix DO, Chino Guerrero DO, Felix Manriquez MD, Sharda Cortes MD, Shaila Quinonez MD, Juliane Holley MD,  Allen Arrington MD, Rita Hoff MD, Andrés Guadalupe MD,  Everardo Monterroso DO, Juju Poe MD, Albino Duong MD, Dg Thomas DO, Ninoska Bautista MD,  Mikie Calderón DO, Nan Rosa MD, Rachele Miller MD, Melani Ash MD,  Kenneth Vieira MD, Marcie Blackmon MD, Dinorah Ahmadi MD, Avis Wilson MD, Brannon Wetzel MD, Vania Dumas MD, Christian Hernandez DO, Richa Nguyễn MD, Dhruv Merino DO, oTno Zaidi MD, Everardo Miller MD, Mohsin Reza, MD, Tawanda Russ MD, Shirley Waterhouse, CNP,  Talisha Saldana, CNP, Christian Damian, CNP,  Emilia Castillo, WENDIE, Maria Elena Castañeda, CNP, Bella Lizarraga, CNP, Nu Vargas, CNP, Cee Garrido, CNP, Dyana Hannah, PA-C, Carmen Apple, CNP, Didier Dean, CNP,  Kaity Hyman, CNP, Laura Bender, CNP, Isaias Roblero, PA-C, Mendy Brennan, PA-C, Teresa Schulte, CNP,        Wendy Schmidt, CNS, Renee Serrato, CNP, Dana Babin, CNP         [facility]   IN-PATIENT SERVICE   [facility]    HISTORY AND PHYSICAL EXAMINATION            Date:   6/27/2025  Patient name:  Radha Joseph  Date of admission:  6/26/2025 11:01 PM  MRN:   [id number]  Account:  [id number]  YOB: 1990  PCP:    No primary care provider on file.  Room:   [facility]  Code Status:    Full Code    Chief Complaint:     Chief Complaint   Patient presents with    Drug Overdose       History Obtained From:     patient, electronic medical record    History of Present Illness:     34-year-old female with past medical history of substance use disorder presented to the emergency department via EMS after being found unresponsive.  She states that she normally uses crack and this time used drugs from a new daily.  She was concerned that it might have been laced with fentanyl.  She  Problems           Last Modified POA    * (Principal) Sepsis ([facility]) 6/27/2025 Yes       Plan:     Septic shock  Acute hypoxic respiratory failure  - Unclear etiology, patient's only symptom is a cough, chest x-ray unremarkable  - Continue Vanco and Zosyn  - Blood cultures, sputum culture, MRSA nares, respiratory viral panel pending  - IV fluids with normal saline at 100/h  - Received sepsis fluid bolus in the emergency department, giving additional IV fluid bolus due to hypotension  - Critical care evaluation due to hypotension  - CT chest when blood pressure improves    Sinus bradycardia  - Evaluated by cardiology  - [place] likely related to drug overdose  - Giving additional dose of Narcan  - If no improvement in heart rate after fluid bolus and Narcan can try atropine    Elevated troponin  - Troponin peaked at 37, down to 31  - Evaluated by cardiology, felt likely secondary to septic shock and FELTON  - No further ischemic workup per cardiology    FELTON  - Likely ATN secondary to septic shock versus dehydration  - Continue IV fluids    Respiratory acidosis, resolved  - Likely secondary to overdose    Fentanyl overdose  Substance use disorder  - Giving additional dose of Narcan  - Continue to monitor mental status  - Social work consult      Consultations:   PHARMACY TO DOSE VANCOMYCIN  IP CONSULT TO INTERNAL MEDICINE  IP CONSULT TO CRITICAL CARE  IP CONSULT TO CARDIOLOGY     Patient is admitted as inpatient status because of co-morbidities listed above, severity of signs and symptoms as outlined, requirement for current medical therapies and most importantly because of direct risk to patient if care not provided in a hospital setting.  Expected length of stay > 48 hours.    Christian Hernandez DO  6/27/2025  9:01 AM    Copy sent to Dr. López primary care provider on file.

## 2025-06-27 NOTE — ED NOTES
The following labs were labeled with appropriate pt sticker and tubed to lab:     [x] Blue     [x] Lavender   [] on ice  [x] Green/yellow  [x] Green/black [] on ice  [] Robertson  [] on ice  [x] Yellow  [x] Red  [] Pink  [] Type/ Screen  [] ABG  [] VBG    [] COVID-19 swab    [] Rapid  [] PCR  [] Flu swab  [] Peds Viral Panel     [] Urine Sample  [] Fecal Sample  [] Pelvic Cultures  [] Blood Cultures  [] X 2  [] STREP Cultures  [] Wound Cultures

## 2025-06-27 NOTE — PROGRESS NOTES
[facility]   Pharmacy Pharmacokinetic Monitoring Service - Vancomycin     Radha Joseph is a 34 y.o. female starting on vancomycin therapy for sepsis. Pharmacy consulted by Dr. Peguero for monitoring and adjustment.    Target Concentration: Goal AUC/LEATHA 400-600 mg*hr/L    Additional Antimicrobials: Zosyn    Pertinent Laboratory Values:   Wt Readings from Last 1 Encounters:   06/27/25 72.6 kg (160 lb)     Temp Readings from Last 1 Encounters:   06/26/25 98 °F (36.7 °C) (Oral)     Estimated Creatinine Clearance: 80 mL/min (A) (based on SCr of 1 mg/dL (H)).  Recent Labs     06/26/25  2315   CREATININE 1.0*   BUN 12   WBC 23.8*     Procalcitonin: n/a    Pertinent Cultures:  Culture Date Source Results        MRSA Nasal Swab: not ordered. Order placed by pharmacy.    Plan:  Dosing recommendations based on Bayesian software  Start vancomycin 1750mg IVPB x 1 dose followed by 750mg IVPB every 12 hours  Anticipated AUC of 465 and trough concentration of 12 at steady state  Renal labs as indicated   Vancomycin concentration ordered for  @    Pharmacy will continue to monitor patient and adjust therapy as indicated    Thank you for the consult,  Harris Silva RPH  6/27/2025 12:45 AM

## 2025-06-27 NOTE — ED NOTES
ED to inpatient nurses report      Chief Complaint:  Chief Complaint   Patient presents with    Drug Overdose     Present to ED from: Home    MOA:     LOC: alert and orientated to name, place, date  Mobility: Independent  Oxygen Baseline: RA    Current needs required: 2LNC   Pending ED orders: NA  Present condition: Stable    Why did the patient come to the ED? Pt arrives to ED by EMS with c/o drug overdose  Pt states she was smoking crack cocaine from a different dealer when she overdosed  Pt is unsure who called 911  Pt on scene given 4mg Narcan IN and become responsive  Pt vomited on scene  Pt is A&Ox4, even unlabored RR NAD  Pt connected to full cardiac monitor, end title, EKG obtained IV established  Pt resting comfortably on cot with call light within reach   What is the plan? Admit for crit care  Any procedures or intervention occur? IV, labs, blood cultures, abx, medication  Any safety concerns?? Fall risk   Patient is hypotensive when sleeping. If you wake her she her BP is improved.   Mental Status:       Psych Assessment:   Psychosocial  Psychosocial (WDL): Within Defined Limits  Vital signs   Vitals:    06/27/25 0545 06/27/25 0600 06/27/25 0615 06/27/25 0713   BP: 109/70 (!) 93/58 (!) 85/61 95/80   Pulse: 79 59 63 69   Resp:       Temp:       TempSrc:       SpO2: 95% 91% 92% 93%   Weight:            Vitals:  Patient Vitals for the past 24 hrs:   BP Temp Temp src Pulse Resp SpO2 Weight   06/27/25 0713 95/80 -- -- 69 -- 93 % --   06/27/25 0615 (!) 85/61 -- -- 63 -- 92 % --   06/27/25 0600 (!) 93/58 -- -- 59 -- 91 % --   06/27/25 0545 109/70 -- -- 79 -- 95 % --   06/27/25 0543 104/60 -- -- 66 -- 95 % --   06/27/25 0530 102/66 -- -- 57 -- 94 % --   06/27/25 0515 (!) 98/59 -- -- 73 -- 95 % --   06/27/25 0430 (!) 85/50 -- -- 51 -- 96 % --   06/27/25 0415 (!) 90/49 -- -- 55 -- 95 % --   06/27/25 0410 (!) 85/53 -- -- 51 -- 95 % --   06/27/25 0130 106/63 -- -- 80 -- 92 % --   06/27/25 0045 112/71 -- -- 78 -- 95 %

## 2025-06-27 NOTE — ED PROVIDER NOTES
[facility] EMERGENCY DEPARTMENT  Emergency Department Encounter  Emergency Medicine Resident     Pt Name:Radha Joseph  MRN: [id number]  Birthdate 1990  Date of evaluation: 6/27/25  PCP:  No primary care provider on file.  Note Started: 12:00 AM EDT      CHIEF COMPLAINT       Chief Complaint   Patient presents with    Drug Overdose       HISTORY OF PRESENT ILLNESS  (Location/Symptom, Timing/Onset, Context/Setting, Quality, Duration, Modifying Factors, Severity.)      Radha Joseph is a 34 y.o. female who presents by EMS with unintentional drug overdose.  Patient reports she was smoking crack today, she smokes crack every day, but today she had gotten her drugs from a new person.  EMS was called for an unconscious person and gave her 4 mg of intranasal Narcan and she woke up.  Patient denies any history of IV fentanyl or heroin use.  She states that the crack she use today must have been laced.  Patient also reports she has been feeling ill recently, coughing up green and yellow sputum for the last 3 to 4 days and just generally fatigued.  Patient denies any cardiac problems or pulmonary problems, she is not diabetic.  She states the only medications she takes on a daily basis are psychiatric medications.    PAST MEDICAL / SURGICAL / SOCIAL / FAMILY HISTORY      has a past medical history of Bipolar affective (HCC), Cerebral palsy (HCC), Chicken pox, and Depression with suicidal ideation.       has a past surgical history that includes Arm Surgery.      Social History     Socioeconomic History    Marital status: Single     Spouse name: Not on file    Number of children: 1    Years of education: 14    Highest education level: Not on file   Occupational History    Not on file   Tobacco Use    Smoking status: Former     Current packs/day: 0.00     Average packs/day: 0.5 packs/day for 15.0 years (7.5 ttl pk-yrs)     Types: Cigarettes     Start date: 4/19/2004     Quit date: 4/19/2019     Years since

## 2025-06-27 NOTE — ED NOTES
The following labs were labeled with appropriate pt sticker and tubed to lab:     [] Blue     [] Lavender   [] on ice  [] Green/yellow  [x] Green/black [x] on ice x2  [] Grey  [] on ice  [] Yellow  [] Red  [] Pink  [] Type/ Screen  [] ABG  [] VBG    [] COVID-19 swab    [] Rapid  [] PCR  [] Flu swab  [] Peds Viral Panel     [] Urine Sample  [] Fecal Sample  [] Pelvic Cultures  [] Blood Cultures  [] X 2  [] STREP Cultures  [] Wound Cultures

## 2025-06-27 NOTE — ED PROVIDER NOTES
[facility]     Emergency Department     Faculty Attestation    I performed a history and physical examination of the patient and discussed management with the resident. I have reviewed and agree with the resident’s findings including all diagnostic interpretations, and treatment plans as written. Any areas of disagreement are noted on the chart. I was personally present for the key portions of any procedures. I have documented in the chart those procedures where I was not present during the key portions. I have reviewed the emergency nurses triage note. I agree with the chief complaint, past medical history, past surgical history, allergies, medications, social and family history as documented unless otherwise noted below. Documentation of the HPI, Physical Exam and Medical Decision Making performed by meganibany is based on my personal performance of the HPI, PE and MDM. For Physician Assistant/ Nurse Practitioner cases/documentation I have personally evaluated this patient and have completed at least one if not all key elements of the E/M (history, physical exam, and MDM). Additional findings are as noted.    Note Started: 11:13 PM EDT     33 yo F overdose, pt admit to smoking crack, unresponsive, given narcan 4 mg prior to arrival, no cp, no injury   PE vss gcs 15 GRACIE no cervical tenderness, crepitus or deformity,   Abdomen non tender, no distension, no rigidity,   -icu declined, inter med admit  EKG Interpretation    Interpreted by me  Sinus rhythm, HR 98, no ST elevation, normal axis, QTc 474    CRITICAL CARE: There was a high probability of clinically significant/life threatening deterioration in this patient's condition which required my urgent intervention.  Total critical care time was 5 minutes.  This excludes any time for separately reportable procedures.       Nicholas Mathews DO  06/26/25 7360       Nicholas Ivy,

## 2025-06-27 NOTE — ED NOTES
Pt updated on plan of care, no distress noted, non labored RR, Aox4. Requesting water and food. Resident notified.

## 2025-06-27 NOTE — PROGRESS NOTES
Patient's blood pressure has been intermittently low.  She has been given multiple doses of Narcan with brief improvement in hypotension, however the hypotension returns after the effects of Narcan wore off.  Narcan drip is being initiated.  Discussed with critical care who will take the patient to the ICU.

## 2025-06-27 NOTE — CONSULTS
[facility]    Consult / H&P               Today's Date: 6/27/2025  Patient Name: Radha Joseph  Date of admission: 6/26/2025 11:01 PM  Patient's age: 34 y.o., 1990  Admission Dx: Sepsis (HCC) [A41.9]    Requesting Physician: No admitting provider for patient encounter.    Cardiac Evaluation Reason:  Bradycardia and hypotension    History Obtained From: patient and chart review     History of Present Illness:    This patient 34 y.o. years old PMHx with history of substance use presented with unintentional drug overdose. She consumed crack but had obtained drugs from new dealer. Suspicion of it being laced with fentanyl. Patient was bradycardic and mildly hypotensive with an FELTON and mildly elevated troponins. Hypotension improved on fluids. Cardiology consulted for evaluation.    Past Medical History:   has a past medical history of Bipolar affective (HCC), Cerebral palsy (HCC), Chicken pox, and Depression with suicidal ideation.    Past Surgical History:   has a past surgical history that includes Arm Surgery.     Home Medications:    Prior to Admission medications    Medication Sig Start Date End Date Taking? Authorizing Provider   ondansetron (ZOFRAN-ODT) 4 MG disintegrating tablet Take 1 tablet by mouth 3 times daily as needed for Nausea or Vomiting 4/15/25   Allen Garrett MD   cloNIDine (CATAPRES) 0.1 MG tablet Take 1 tablet by mouth 2 times daily    Ana Newberry MD   haloperidol (HALDOL) 5 MG tablet Take 1 tablet by mouth 4 times daily    Ana Newberry MD   methocarbamol (ROBAXIN) 750 MG tablet Take 1 tablet by mouth 4 times daily    Ana Newberry MD   dicyclomine (BENTYL) 10 MG capsule Take 1 capsule by mouth 4 times daily as needed (diarrhea, abdominal cramping) 2/4/25   Wendy Arroyo, APRN - CNP   OLANZapine (ZYPREXA) 10 MG tablet Take 1 tablet by mouth nightly 5/20/24   Joss Dennison MD   ferrous sulfate (IRON 325) 325 (65 Fe) MG tablet Take 1

## 2025-06-27 NOTE — PROGRESS NOTES
Piperacillin-Tazobactam Extended Interval Interchange    The following ordered dose of Piperacillin-Tazobactam has been changed to optimize its pharmacodynamic profile as approved by the P&T Committee for adult patients.    Piperacillin/Tazobactam Ordered Creatinine Clearance* Therapeutic Interchange   3.375 gm IV over 30 minutes Greater than 20 mL/min 3.375 gm IV over 4hrs every 8hrs    Less than 20 mL/min 3.375 gm IV over 4hrs every 12hrs   *Dialysis patients should receive traditional infusion dosing     Pharmacists should be contacted for issues concerning drug compatibility with multiple IV medications.  All doses will be prepared using 50ml to be infused over 4-hours at a rate of 12.5ml/hr.    Thank You,  Laura Coleman, RPH6/27/73267:01 AM

## 2025-06-27 NOTE — CARE COORDINATION
SW consult received for substance use disorder.  Met with pt to complete assessment and offer resources.  Pt states she lives with a roommate Sydney.  She states her mother is her best support system.  She does not have any children and has one brother who she does not have much contact with.  Pt states that she has had the same roommate for the past three years and they both use.  Pt tells writer that she has used crack cocaine daily for the past 12 years.  She has been in treatment in the past but claims \"it helps but never sticks\".  Pt denies alcohol use.  She does admit to history of depression and is linked with [facility].  She also has a diagnosis of Schizophrenia and Bipolar disorder.  Pt admits to SI many years ago but denies current SI.  Pt does admit that she is non compliant with appointments and is also non compliant with taking her mental health medications.   Offered drug treatment resources however, pt declined desire for tx or resources.

## 2025-06-27 NOTE — PLAN OF CARE
Received consult from ER for assessment.  Patient initially presented to the ER after an accidental overdose.  Patient reports that she snorts crack cocaine on a daily basis.  Reports that she bought her cocaine off of the different dealer today and believes that her cocaine might have been laced with fentanyl.  She reports that her girlfriend called EMS after the accidental overdose.  She denies any concomitant drug/alcohol use.  She denies any medical history.  She denies any fever, chills, sweats, nausea, vomiting, chest pain, shortness of breath.  She reports that over the past 10 days she has been having a cough that is productive of greenish sputum.  She denies any sick contacts.  She denies any dysuria frequency or urgency.  Patient received 4 mg of intranasal Narcan and became responsive.  Patient initially hypotensive however blood pressures have improved after IV fluids.  Not on pressors.  Mentating appropriately.  Alert and oriented x 4.  Labs reviewed.  Patient was started on Vanco and Zosyn by the ER for suspected sepsis.  UDS is pending.  UA is pending.  Troponins 18> 37> 31.  Reviewed, normal sinus ventricular rate of 63.  QTc 473.  Elevated troponins likely in the setting of cocaine use.  Patient was loaded with aspirin in the ER.    No ICU needs at this time.  Agree with admission however patient is appropriate for stepdown at this time.  Please consult ICU if blood pressure is no longer become responsive to IV fluids or if change in clinical status. Leukocytosis could be secondary to drug use/overdose.    Recommend admission to the medical team, continue infectious workup.

## 2025-06-27 NOTE — ED NOTES
The following labs were labeled with appropriate pt sticker and tubed to lab:     [] Blue     [] Lavender   [] on ice  [] Green/yellow  [] Green/black [] on ice  [] Grey  [] on ice  [] Yellow  [] Red  [] Pink  [] Type/ Screen  [] ABG  [] VBG    [] COVID-19 swab    [] Rapid  [] PCR  [] Flu swab  [] Peds Viral Panel     [] Urine Sample  [] Fecal Sample  [] Pelvic Cultures  [x] Blood Cultures  [] X 2  [] STREP Cultures  [] Wound Cultures

## 2025-06-27 NOTE — H&P
[id number] [] Sucralfate [] Other:  VTE:   [x] Enoxaparin [] Unfract. Heparin Subcut [] EPC Cuffs    NUTRITION:  [] NPO [] Tube Feeding (Specify: ) [] TPN  [x] PO    HOME MEDS RECONCILED: [] No  [x] Yes    CONSULTATION NEEDED:  [x] No  [] Yes    FAMILY UPDATED:    [x] No  [] Yes    TRANSFER OUT OF ICU:   [x] No  [] Yes      Neuro  Sedation : none  Analgesia : none  UDS positive for cocaine and fentanyl  Continue home zyprexa  Narcan gtt    CV  Hypotension   Pressors : none  Narcan gtt     Pulm  Suspected pneumonia   Room air  RPP negative  CT chest w/ bibasilar ground glass opacities  Maintain > 92%    GI/Nutrition  Bowel regimen  GI prophylaxis : protonix     ADULT DIET; Regular    Renal/lytes  S/p 4L NS in ED  Daily BMP  Replace lytes prn       Intake/Output Summary (Last 24 hours) at 2025 1304  Last data filed at 2025 0315  Gross per 24 hour   Intake 1000 ml   Output --   Net 1000 ml       Recent Labs     25  2315      K 4.1      CO2 15*   BUN 12   CREATININE 1.0*       Heme  No overt bleeding  Monitor  Transfuse for hgb < 7  Daily CBC    Recent Labs     25   HGB 13.0   HCT 42.0       Endocrine  No hx DM  BG normalized  Monitor    Recent Labs     255 25  0412   GLUCOSE 322* 83   POCGLU  --  83       Infectious disease  Suspected pneumonia   Lactic from 4.6 -> 1.2  Abx : vanc and zosyn   Deescalate per cultures     Temp (24hrs), Av °F (36.7 °C), Min:98 °F (36.7 °C), Max:98 °F (36.7 °C)      Recent Labs     25   WBC 23.8*       Family/dispo  Remain in ICU    Lines  PIVs    Prophylaxis   DVT : lovenox  GI : PPI      Maya Del Valle DO  Emergency Medicine Resident  Critical Care Service  [facility] [place] ([place])             2025, 1:04 PM      Attending Physician Statement  I have discussed the care of Radha Joseph, including pertinent history and exam findings,  with the resident. I have seen and examined the

## 2025-06-28 VITALS
DIASTOLIC BLOOD PRESSURE: 50 MMHG | BODY MASS INDEX: 27 KG/M2 | HEIGHT: 66 IN | WEIGHT: 168 LBS | RESPIRATION RATE: 17 BRPM | SYSTOLIC BLOOD PRESSURE: 97 MMHG | TEMPERATURE: 98 F | OXYGEN SATURATION: 99 % | HEART RATE: 53 BPM

## 2025-06-28 LAB
ALBUMIN SERPL-MCNC: 3 G/DL (ref 3.5–5.2)
ALBUMIN/GLOB SERPL: 1.4 {RATIO} (ref 1–2.5)
ALP SERPL-CCNC: 53 U/L (ref 35–104)
ALT SERPL-CCNC: 41 U/L (ref 10–35)
ANION GAP SERPL CALCULATED.3IONS-SCNC: 6 MMOL/L (ref 9–16)
AST SERPL-CCNC: 40 U/L (ref 10–35)
BASOPHILS # BLD: 0.07 K/UL (ref 0–0.2)
BASOPHILS NFR BLD: 1 % (ref 0–2)
BILIRUB SERPL-MCNC: <0.2 MG/DL (ref 0–1.2)
BUN SERPL-MCNC: 6 MG/DL (ref 6–20)
CALCIUM SERPL-MCNC: 8.3 MG/DL (ref 8.6–10.4)
CHLORIDE SERPL-SCNC: 114 MMOL/L (ref 98–107)
CO2 SERPL-SCNC: 22 MMOL/L (ref 20–31)
CREAT SERPL-MCNC: 0.6 MG/DL (ref 0.6–0.9)
EOSINOPHIL # BLD: 0.24 K/UL (ref 0–0.44)
EOSINOPHILS RELATIVE PERCENT: 3 % (ref 1–4)
ERYTHROCYTE [DISTWIDTH] IN BLOOD BY AUTOMATED COUNT: 14.6 % (ref 11.8–14.4)
GFR, ESTIMATED: >90 ML/MIN/1.73M2
GLUCOSE SERPL-MCNC: 92 MG/DL (ref 74–99)
HCT VFR BLD AUTO: 33 % (ref 36.3–47.1)
HGB BLD-MCNC: 10.2 G/DL (ref 11.9–15.1)
IMM GRANULOCYTES # BLD AUTO: 0.06 K/UL (ref 0–0.3)
IMM GRANULOCYTES NFR BLD: 1 %
INR PPP: 1
LYMPHOCYTES NFR BLD: 2.18 K/UL (ref 1.1–3.7)
LYMPHOCYTES RELATIVE PERCENT: 30 % (ref 24–43)
MCH RBC QN AUTO: 28.1 PG (ref 25.2–33.5)
MCHC RBC AUTO-ENTMCNC: 30.9 G/DL (ref 28.4–34.8)
MCV RBC AUTO: 90.9 FL (ref 82.6–102.9)
MONOCYTES NFR BLD: 0.54 K/UL (ref 0.1–1.2)
MONOCYTES NFR BLD: 7 % (ref 3–12)
MRSA, DNA, NASAL: NEGATIVE
NEUTROPHILS NFR BLD: 58 % (ref 36–65)
NEUTS SEG NFR BLD: 4.24 K/UL (ref 1.5–8.1)
NRBC BLD-RTO: 0 PER 100 WBC
PLATELET # BLD AUTO: 290 K/UL (ref 138–453)
PMV BLD AUTO: 9 FL (ref 8.1–13.5)
POTASSIUM SERPL-SCNC: 4 MMOL/L (ref 3.7–5.3)
PROT SERPL-MCNC: 5.1 G/DL (ref 6.6–8.7)
PROTHROMBIN TIME: 13.2 SEC (ref 11.7–14.9)
RBC # BLD AUTO: 3.63 M/UL (ref 3.95–5.11)
RBC # BLD: ABNORMAL 10*6/UL
SODIUM SERPL-SCNC: 142 MMOL/L (ref 136–145)
SPECIMEN DESCRIPTION: NORMAL
WBC OTHER # BLD: 7.3 K/UL (ref 3.5–11.3)

## 2025-06-28 PROCEDURE — 85610 PROTHROMBIN TIME: CPT

## 2025-06-28 PROCEDURE — 80053 COMPREHEN METABOLIC PANEL: CPT

## 2025-06-28 PROCEDURE — 2580000003 HC RX 258

## 2025-06-28 PROCEDURE — 6360000002 HC RX W HCPCS: Performed by: NURSE PRACTITIONER

## 2025-06-28 PROCEDURE — 99239 HOSP IP/OBS DSCHRG MGMT >30: CPT | Performed by: STUDENT IN AN ORGANIZED HEALTH CARE EDUCATION/TRAINING PROGRAM

## 2025-06-28 PROCEDURE — 6360000002 HC RX W HCPCS

## 2025-06-28 PROCEDURE — 36415 COLL VENOUS BLD VENIPUNCTURE: CPT

## 2025-06-28 PROCEDURE — 2580000003 HC RX 258: Performed by: NURSE PRACTITIONER

## 2025-06-28 PROCEDURE — 6370000000 HC RX 637 (ALT 250 FOR IP): Performed by: NURSE PRACTITIONER

## 2025-06-28 PROCEDURE — 85025 COMPLETE CBC W/AUTO DIFF WBC: CPT

## 2025-06-28 RX ORDER — BENZONATATE 100 MG/1
100 CAPSULE ORAL 3 TIMES DAILY PRN
Qty: 30 CAPSULE | Refills: 0 | Status: SHIPPED | OUTPATIENT
Start: 2025-06-28 | End: 2025-07-08

## 2025-06-28 RX ORDER — GUAIFENESIN/DEXTROMETHORPHAN 100-10MG/5
5 SYRUP ORAL 3 TIMES DAILY PRN
Qty: 120 ML | Refills: 0 | Status: SHIPPED | OUTPATIENT
Start: 2025-06-28 | End: 2025-07-08

## 2025-06-28 RX ORDER — AMOXICILLIN 500 MG/1
500 CAPSULE ORAL 2 TIMES DAILY
Qty: 14 CAPSULE | Refills: 0 | Status: SHIPPED | OUTPATIENT
Start: 2025-06-28 | End: 2025-07-05

## 2025-06-28 RX ADMIN — ENOXAPARIN SODIUM 40 MG: 100 INJECTION SUBCUTANEOUS at 09:21

## 2025-06-28 RX ADMIN — PIPERACILLIN AND TAZOBACTAM 3375 MG: 3; .375 INJECTION, POWDER, LYOPHILIZED, FOR SOLUTION INTRAVENOUS at 09:23

## 2025-06-28 RX ADMIN — PIPERACILLIN AND TAZOBACTAM 3375 MG: 3; .375 INJECTION, POWDER, LYOPHILIZED, FOR SOLUTION INTRAVENOUS at 01:16

## 2025-06-28 RX ADMIN — VANCOMYCIN HYDROCHLORIDE 750 MG: 750 INJECTION, POWDER, LYOPHILIZED, FOR SOLUTION INTRAVENOUS at 01:19

## 2025-06-28 RX ADMIN — FERROUS SULFATE TAB EC 325 MG (65 MG FE EQUIVALENT) 325 MG: 325 (65 FE) TABLET DELAYED RESPONSE at 09:21

## 2025-06-28 NOTE — PROGRESS NOTES
INTENSIVE CARE UNIT  Resident Physician Progress Note    Patient - Radha Joseph  Date of Admission -  6/26/2025 11:01 PM  Date of Evaluation -  6/28/2025  Room and Bed Number -  3012/3012-01   Hospital Day - 1    HPI:     History was obtained from chart review and the patient.  Radha Joseph is a 34 y.o. female brought in by EMS for evaluation of unintentional overdose. EMS was called out for unconscious person. She was given narcan with resolution of symptoms.      She has a history of daily crack use but today obtained her drugs from a new person and thinks it may have been laced with fentanyl. Drug test in ED positive for both cocaine and fentanyl. She reports several days of feeling ill, fatigue, and cough productive of green and yellow sputum. Her only daily medications are psychiatric in nature, denies missing any doses.      She was originally admitted to the hospitalist service. While boarding in the ED she had recurrent episodes of hypotension, most notably when asleep. While sleeping she protects her airway and is easily awoken with improvement in her blood pressure. She was also given several pushes of narcan with improvement in pressure. She was evaluated twice by the critical care team and she was deemed appropriate for step down. Throughout the day she continued to have episodes of hypotension and ultimately hospitalist team started narcan drip for both her mentation and pressure.      On initial evaluation, patient is sleeping comfortably on room air. She awakens easily and is conversant. She does complain of a cough that is sometimes so persistent she vomits but otherwise is feeling overall well.      Patient to be transferred to ICU for close monitoring of mentation and blood pressure on Narcan drip.     6/27 : Awake, alert, answering questions. Eating, drinking, ambulating. Transition off drip. Continue tx for pneumonia.     SUBJECTIVE:     OVERNIGHT EVENTS:    No overnight events.

## 2025-06-28 NOTE — PLAN OF CARE
Problem: Discharge Planning  Goal: Discharge to home or other facility with appropriate resources  6/28/2025 0504 by Rina Moyer, RN  Outcome: Progressing  6/28/2025 0504 by Rina Moyer, RN  Outcome: Progressing

## 2025-06-28 NOTE — PLAN OF CARE
Problem: Discharge Planning  Goal: Discharge to home or other facility with appropriate resources  6/28/2025 1330 by Lois Asher, RN  Outcome: Progressing  6/28/2025 0504 by Rina Moyer, RN  Outcome: Progressing  6/28/2025 0504 by Rina Moyer, RN  Outcome: Progressing

## 2025-06-28 NOTE — CARE COORDINATION
Case Management Assessment  Initial Evaluation    Date/Time of Evaluation: 6/28/2025 11:22 AM  Assessment Completed by: Nikky Baca RN    If patient is discharged prior to next notation, then this note serves as note for discharge by case management.    Patient Name: Radha Joseph                   YOB: 1990  Diagnosis: Accidental overdose, initial encounter [T50.901A]  Sepsis (HCC) [A41.9]                   Date / Time: 6/26/2025 11:01 PM    Patient Admission Status: Inpatient   Readmission Risk (Low < 19, Mod (19-27), High > 27): Readmission Risk Score: 8.8    Current PCP: No primary care provider on file.  PCP verified by CM? (P) Yes (\"office in [place], I dont know name\")    Chart Reviewed: Yes      History Provided by: (P) Patient  Patient Orientation: (P) Alert and Oriented, Person, Place, Situation, Self    Patient Cognition: (P) Alert    Hospitalization in the last 30 days (Readmission):  No    If yes, Readmission Assessment in CM Navigator will be completed.    Advance Directives:      Code Status: Full Code   Patient's Primary Decision Maker is: (P) Legal Next of Kin      Discharge Planning:    Patient lives with: (P) Spouse/Significant Other Type of Home: (P) Apartment  Primary Care Giver: (P) Self  Patient Support Systems include: (P) Spouse/Significant Other   Current Financial resources: (P) Other (Comment) (commercial)  Current community resources: (P) None  Current services prior to admission: (P) None            Current DME:              Type of Home Care services:  (P) None    ADLS  Prior functional level: (P) Independent in ADLs/IADLs  Current functional level: (P) Independent in ADLs/IADLs    PT AM-PAC:   /24  OT AM-PAC:   /24    Family can provide assistance at DC: (P) No  Would you like Case Management to discuss the discharge plan with any other family members/significant others, and if so, who? (P) No  Plans to Return to Present Housing: (P) Yes  Other Identified

## 2025-06-28 NOTE — DISCHARGE SUMMARY
[facility]     Department of Internal Medicine - Critical Care Service    INPATIENT DISCHARGE SUMMARY      PATIENT IDENTIFICATION:  NAME:  Radha Joseph   :   1990  MRN:    [id number]     Acct:    [id number]   Admit Date:  2025  Discharge date:  2025  Attending Provider: Abel Carrera MD                                     Principal Problem:    Sepsis (HCC)  Active Problems:    Sinus bradycardia    NSTEMI (non-ST elevated myocardial infarction) ([facility])    Accidental overdose    Opioid overdose ([facility])  Resolved Problems:    * No resolved hospital problems. *       REASON FOR HOSPITALIZATION:   Chief Complaint   Patient presents with    Drug Overdose          Hospital Course  History was obtained from chart review and the patient.  Radha Joseph is a 34 y.o. female brought in by EMS for evaluation of unintentional overdose. EMS was called out for unconscious person. She was given narcan with resolution of symptoms.      She has a history of daily crack use but today obtained her drugs from a new person and thinks it may have been laced with fentanyl. Drug test in ED positive for both cocaine and fentanyl. She reports several days of feeling ill, fatigue, and cough productive of green and yellow sputum. Her only daily medications are psychiatric in nature, denies missing any doses.      She was originally admitted to the hospitalist service. While boarding in the ED she had recurrent episodes of hypotension, most notably when asleep. While sleeping she protects her airway and is easily awoken with improvement in her blood pressure. She was also given several pushes of narcan with improvement in pressure. She was evaluated twice by the critical care team and she was deemed appropriate for step down. Throughout the day she continued to have episodes of hypotension and ultimately hospitalist team started narcan drip for both her mentation and pressure.     Patient to be  needed (diarrhea, abdominal cramping)  Qty: 60 capsule, Refills: 0      OLANZapine (ZYPREXA) 10 MG tablet Take 1 tablet by mouth nightly  Qty: 30 tablet, Refills: 3      ferrous sulfate (IRON 325) 325 (65 Fe) MG tablet Take 1 tablet by mouth 2 times daily (with meals)  Qty: 60 tablet, Refills: 3           Activity: activity as tolerated    Diet: regular diet    Disposition: home    Follow-up:  in the next few days with PCP    Electronically signed by Maya Del Valle DO on 6/28/2025 at 10:36 AM     Time Spent on discharge is more than 30 min in the examination, evaluation, counseling and review of medications and discharge plan.      Maya Del Valle DO  Emergency Medicine Resident  Critical Care Service

## 2025-06-28 NOTE — PROGRESS NOTES
CLINICAL PHARMACY NOTE: MEDS TO BEDS    Total # of Prescriptions Filled: 3   The following medications were delivered to the patient:  Amoxicillin  Benzonatate  Mar-tussin    Additional Documentation:

## 2025-06-28 NOTE — PROGRESS NOTES
Pt discharge instructions reviewed with pt.  Piv's x2 removed prior to discharge.  Script for narcan and other meds fro pharmacy sent with pt.  Pt able to walk with writer downstairs and out doors for discharge

## 2025-06-28 NOTE — DISCHARGE INSTRUCTIONS
You were seen in the ER and admitted to the hospital for monitoring after an overdose. It appears the cocaine you used may have been laced with fentanyl. You are being provided with a narcan kit, please review instructions for use and keep in your possession, even if you do not need it in the future it can save the life of someone else if needed.     Your blood pressure was also slightly low throughout your stay. Please drink a lot of fluids. You may wish to see your doctor for further workup if needed.     You are also being treated for pneumonia. You are being given a course of antibiotics, please take all of them even if you begin to feel better.  You should not have any antibiotics leftover. If you do not take all of them there is risk of antibiotic resistance and difficulty treating infections in the future.     Please follow up with your doctor within the next few days for reevaluation, if you do not have a doctor a number for a local clinic has been provided. Return to the ER if vomiting, shortness of breath, chest pain, passing out, or any other concerns.

## 2025-06-29 LAB
MICROORGANISM SPEC CULT: NORMAL
SERVICE CMNT-IMP: NORMAL
SPECIMEN DESCRIPTION: NORMAL

## 2025-07-02 LAB
MICROORGANISM SPEC CULT: NORMAL
SERVICE CMNT-IMP: NORMAL
SPECIMEN DESCRIPTION: NORMAL